# Patient Record
Sex: MALE | Race: BLACK OR AFRICAN AMERICAN | NOT HISPANIC OR LATINO | Employment: OTHER | URBAN - METROPOLITAN AREA
[De-identification: names, ages, dates, MRNs, and addresses within clinical notes are randomized per-mention and may not be internally consistent; named-entity substitution may affect disease eponyms.]

---

## 2017-10-03 ENCOUNTER — HOSPITAL ENCOUNTER (EMERGENCY)
Facility: HOSPITAL | Age: 67
Discharge: HOME | End: 2017-10-03
Attending: EMERGENCY MEDICINE
Payer: MEDICARE

## 2017-10-03 VITALS
WEIGHT: 289 LBS | HEIGHT: 68 IN | DIASTOLIC BLOOD PRESSURE: 73 MMHG | HEART RATE: 71 BPM | OXYGEN SATURATION: 96 % | RESPIRATION RATE: 18 BRPM | TEMPERATURE: 99 F | SYSTOLIC BLOOD PRESSURE: 123 MMHG | BODY MASS INDEX: 43.8 KG/M2

## 2017-10-03 DIAGNOSIS — M25.562 LEFT KNEE PAIN: ICD-10-CM

## 2017-10-03 DIAGNOSIS — R07.81 RIB PAIN ON RIGHT SIDE: ICD-10-CM

## 2017-10-03 DIAGNOSIS — S20.211A RIB CONTUSION, RIGHT, INITIAL ENCOUNTER: Primary | ICD-10-CM

## 2017-10-03 DIAGNOSIS — S80.02XA CONTUSION OF LEFT KNEE, INITIAL ENCOUNTER: ICD-10-CM

## 2017-10-03 PROCEDURE — 25000003 PHARM REV CODE 250: Performed by: EMERGENCY MEDICINE

## 2017-10-03 PROCEDURE — 99283 EMERGENCY DEPT VISIT LOW MDM: CPT

## 2017-10-03 RX ORDER — CYCLOBENZAPRINE HCL 10 MG
10 TABLET ORAL 3 TIMES DAILY PRN
Qty: 15 TABLET | Refills: 0 | Status: SHIPPED | OUTPATIENT
Start: 2017-10-03 | End: 2017-10-08

## 2017-10-03 RX ORDER — TRAMADOL HYDROCHLORIDE 50 MG/1
50 TABLET ORAL EVERY 6 HOURS PRN
Qty: 12 TABLET | Refills: 0 | Status: SHIPPED | OUTPATIENT
Start: 2017-10-03 | End: 2017-10-13

## 2017-10-03 RX ORDER — ACETAMINOPHEN 325 MG/1
975 TABLET ORAL
Status: COMPLETED | OUTPATIENT
Start: 2017-10-03 | End: 2017-10-03

## 2017-10-03 RX ORDER — BUMETANIDE 1 MG/1
2 TABLET ORAL 2 TIMES DAILY
COMMUNITY
End: 2020-02-03

## 2017-10-03 RX ORDER — METFORMIN HYDROCHLORIDE 1000 MG/1
1000 TABLET, FILM COATED, EXTENDED RELEASE ORAL
COMMUNITY
End: 2020-02-03

## 2017-10-03 RX ADMIN — ACETAMINOPHEN 975 MG: 325 TABLET ORAL at 03:10

## 2017-10-03 NOTE — DISCHARGE INSTRUCTIONS
Regarding RIB CONTUSION, I instructed patient to take all medications as prescribed including nonsteroidal anti-inflammatory drugs (to help decrease pain and swelling), narcotic or controlled substances (used for moderate to severe pain - no operating heavy machinery or driving while taking these medications), and other medications appropriate for this condition.  For treatment, encouraged patient to participate in coughing and deep breathing exercises to prevent pneumonia; utilize a pillow to brace ribs when coughing and deep breathing to help decrease pain; use an incentive spirometer as prescribed, if applicable; avoid activities that may cause more pain or damage to ribs; and apply ice packs to area to help decrease swelling and pain.  Patient instructed to return to the emergency department or contact primary care provider for: fever; bruising to chest; develop a worsening cough; suddenly experience dizziness, shortness of breath, or chest pain; begin coughing up blood; or have any questions or concerns regarding the condition or treatment plan.    Regarding KNEE PAIN: Patient instructed to follow prescribed therapy.  I encouraged patient to get plenty of rest, work to obtain/maintain healthy weight and BMI, exercise to improve muscle strength and motion to joint area, protect joint from further injury, and avoid overexerting extremity - especially when stiffness or pain is present. Advised patient to follow up with primary care provider in one week if symptoms are still present or condition worsens.     Regarding CONTUSIONS, I advised patient to: REST the injured area or use it less than usual; apply ICE to decrease swelling and pain and help prevent tissue damage; use COMPRESSION with an elastic bandage to support the area and decrease swelling; ELEVATE injured body part above the level of the heart to help decrease pain and swelling; AVOID using massage or massage to acute injuries as it may slow healing of  the area; AVOID drinking alcohol as it may slow healing of the injury; and avoid stretching injured muscles. Advised patient to return to the emergency department or contact primary care provider if: having trouble moving injured area; notice tingling or numbness in or near the injured area; extremity below the bruise gets cold or turns pale; a new lump develops in the injured area; symptoms do not improve with treatment after 4 to 5 days; there is any questions or concerns about the condition or treatment plan.

## 2017-10-03 NOTE — ED PROVIDER NOTES
"Encounter Date: 10/3/2017       History     Chief Complaint   Patient presents with    Fall     Pt reports he fell on Mon morning after tripping over a stool, tried to use home remedies but nothing worked. C/O R side chest wall pain & L knee pain. - LOC.      The history is provided by the patient.   Fall   The accident occurred yesterday. The fall occurred while standing (pt was leaning on a stool at home and stool tipped over and pt fell on top of stool.  landed on right side of ribs and left knee.  no LOC.  no head trauma.  pt on plavix). He fell from a height of 3 to 5 ft. He landed on a hard floor. There was no blood loss. Point of impact: right side of ribs and left knee. Pain location: right side of ribs and left knee.  no abrasions.  no brusing.  no HA.  no head trauma. Pain scale: mild. He was ambulatory at the scene. There was no entrapment after the fall. There was no drug use involved in the accident. There was no alcohol use involved in the accident. Pertinent negatives include no neck pain, no back pain, no paresthesias, no paralysis, no visual change, no fever, no numbness, no abdominal pain, no bowel incontinence, no nausea, no vomiting, no hematuria, no headaches, no hearing loss, no loss of consciousness and no tingling. The symptoms are aggravated by activity, use of the injured limb, pressure on the injury, standing and ambulation. He has tried heat and rest (denies any otc tylenol or nsaids for pain) for the symptoms. The treatment provided no relief.     Review of patient's allergies indicates:   Allergen Reactions    Asa [aspirin]      Pt states "uncoated" ASA     Past Medical History:   Diagnosis Date    Anticoagulant long-term use     Arthritis     CAD (coronary artery disease)     CHF (congestive heart failure)     Diabetes mellitus     Hyperlipemia     Hypertension      Past Surgical History:   Procedure Laterality Date    CARDIAC SURGERY      CARPAL TUNNEL RELEASE      " CORONARY ANGIOPLASTY WITH STENT PLACEMENT       History reviewed. No pertinent family history.  Social History   Substance Use Topics    Smoking status: Current Every Day Smoker     Packs/day: 1.00     Types: Cigarettes    Smokeless tobacco: Never Used    Alcohol use Yes      Comment: very little     Review of Systems   Constitutional: Negative for fever.   HENT: Negative for sore throat.    Respiratory: Negative for shortness of breath.    Cardiovascular: Negative for chest pain.   Gastrointestinal: Negative for abdominal pain, bowel incontinence, nausea and vomiting.   Genitourinary: Negative for dysuria and hematuria.   Musculoskeletal: Negative for back pain and neck pain.   Skin: Negative for rash.   Neurological: Negative for tingling, loss of consciousness, weakness, numbness, headaches and paresthesias.   Hematological: Does not bruise/bleed easily.   All other systems reviewed and are negative.      Physical Exam     Initial Vitals [10/03/17 0314]   BP Pulse Resp Temp SpO2   137/75 78 18 98.6 °F (37 °C) 95 %      MAP       95.67         Physical Exam    Nursing note and vitals reviewed.  Constitutional: Vital signs are normal. He appears well-developed and well-nourished. He is not diaphoretic. No distress.   HENT:   Head: Normocephalic and atraumatic.   Nose: Nose normal.   Mouth/Throat: Uvula is midline and oropharynx is clear and moist.   Eyes: EOM are normal. Pupils are equal, round, and reactive to light. No scleral icterus.   Neck: Normal range of motion and full passive range of motion without pain. Neck supple. No thyromegaly present.   Cardiovascular: Normal rate, regular rhythm, normal heart sounds and intact distal pulses. Exam reveals no gallop and no friction rub.    No murmur heard.  Pulmonary/Chest: No respiratory distress. He has wheezes (mild end exp wheezing.  pt states this is chronic ) in the right upper field, the right middle field, the right lower field, the left upper field, the  "left middle field and the left lower field. He has no rhonchi. Chest wall is not dull to percussion. He exhibits tenderness and bony tenderness (in area diagrammed). He exhibits no mass, no laceration, no crepitus, no edema, no deformity, no swelling and no retraction.       Abdominal: Soft. Bowel sounds are normal. He exhibits no distension. There is no tenderness. There is no rebound and no guarding.   Musculoskeletal: He exhibits no edema.        Right shoulder: He exhibits bony tenderness (along right side of ribs).        Right hip: Normal.        Left hip: Normal.        Right knee: Normal.        Left knee: He exhibits decreased range of motion (secondary to pain), swelling and bony tenderness (over patella.  no palpable fracture). He exhibits no effusion, no ecchymosis, no deformity, no laceration, no erythema and normal alignment. Tenderness (over patella) found.        Right ankle: Normal.        Left ankle: Normal. Achilles tendon normal.        Thoracic back: Normal.        Lumbar back: Normal.        Arms:       Right upper leg: Normal.        Left upper leg: Normal.        Right lower leg: Normal.        Left lower leg: Normal.        Legs:       Right foot: Normal.        Left foot: Normal.   Lymphadenopathy:     He has no cervical adenopathy.   Neurological: He is alert and oriented to person, place, and time. He has normal strength. No cranial nerve deficit or sensory deficit.   Skin: Skin is warm and dry.   Psychiatric: He has a normal mood and affect. His behavior is normal. Judgment and thought content normal.         ED Course   Procedures  Labs Reviewed - No data to display       Vitals:    10/03/17 0314 10/03/17 0435   BP: 137/75 123/73   Pulse: 78 71   Resp: 18 18   Temp: 98.6 °F (37 °C)    TempSrc: Oral    SpO2: 95% 96%   Weight: 131.1 kg (289 lb)    Height: 5' 8" (1.727 m)        No results found for this or any previous visit.      Imaging Results          X-Ray Ribs 2 View Right " (Preliminary result)  Result time 10/03/17 04:13:40    ED Interpretation by Blaise Howard Jr., MD (10/03/17 04:13:40, [facility], Emergency Medicine)    naf                             X-Ray Knee Complete 4 or More Views Left (Preliminary result)  Result time 10/03/17 04:13:34    ED Interpretation by Blaise Howard Jr., MD (10/03/17 04:13:34, [facility], Emergency Medicine)    naf                              Medications   acetaminophen tablet 975 mg (975 mg Oral Given 10/3/17 0338)         4:24 AM - Re-evaluation: The patient is resting comfortably and is in no acute distress. He states that his symptoms have improved after treatment within ER. Discussed test results, shared treatment plan, specific conditions for return, and importance of follow up with patient and family.  Advised pt to stop smoking.  He understands and agrees with the plan as discussed. Answered  his questions at this time. He has remained hemodynamically stable throughout the ED course and is appropriate for discharge home.     For RIB CONTUSION, I recommended that the patient perform deep breathing exercises to help prevent pneumonia (take 10 deep breaths every hour while awake); brace ribs with hands or a pillow while coughing or deep breathing to help decrease the pain; allow for adequate amount of rest to to decrease swelling and allow the injury to heal faster; avoid activities that may cause more pain or damage to ribs; apply ice packs to help decrease swelling and pain and prevent tissue damage (use an ice pack or put crushed ice in a plastic bag and cover it with a towel and place it on injured area for 15 to 20 minutes every hour as directed).  I instructed patient to contact primary healthcare provider if they: develop a fever; notice increased bruising on chest; have chills and increased coughing; or have any questions or concerns about condition.  Advised patient to return to emergency department  or call 911 if they suddenly have more severe chest pain; cough up blood; experience fever or chills and increased shortness of breath; have abdominal pain; suddenly feel lightheaded and short of breath, or notice tenderness and warmth to the arms or legs. Patient also instructed to take medications as prescribed and to avoid operating heavy machinery or driving while taking muscle relaxer's or pain medications.    Regarding KNEE PAIN: Patient instructed to follow prescribed therapy.  I encouraged patient to get plenty of rest, work to obtain/maintain healthy weight and BMI, exercise to improve muscle strength and motion to joint area, protect joint from further injury, and avoid overexerting extremity - especially when stiffness or pain is present. Advised patient to follow up with primary care provider in one week if symptoms are still present or condition worsens.     Regarding CONTUSIONS, I advised patient to: REST the injured area or use it less than usual; apply ICE to decrease swelling and pain and help prevent tissue damage; use COMPRESSION with an elastic bandage to support the area and decrease swelling; ELEVATE injured body part above the level of the heart to help decrease pain and swelling; AVOID using massage or massage to acute injuries as it may slow healing of the area; AVOID drinking alcohol as it may slow healing of the injury; and avoid stretching injured muscles. Advised patient to return to the emergency department or contact primary care provider if: having trouble moving injured area; notice tingling or numbness in or near the injured area; extremity below the bruise gets cold or turns pale; a new lump develops in the injured area; symptoms do not improve with treatment after 4 to 5 days; there is any questions or concerns about the condition or treatment plan.      Driving or other activities under influence of medications - Patient and/or family/caretaker was given a prescription for, or  instructed to use a medicine that may impair ability to drive, operate machinery, or participate in other potentially dangerous activities.  Patient was instructed not to participate in these activities while under the influence of these medications.    Pre-hypertension/Hypertension: The pt has been informed that they may have pre-hypertension or hypertension based on a blood pressure reading in the ED. I recommend that the pt call the PCP listed on their discharge instructions or a physician of their choice this week to arrange f/u for further evaluation of possible pre-hypertension or hypertension.     Fransisco Durand was given a handout which discussed their disease process, precautions, and instructions for follow-up and therapy.    Follow-up Information     Kiera Dhaliwal MD. Schedule an appointment as soon as possible for a visit in 1 week.    Specialty:  Internal Medicine  Contact information:  [facility] [address]  [phone]             [facility].    Specialty:  Emergency Medicine  Why:  As needed, If symptoms worsen  Contact information:  [address] [address] [address] [phone]                 Discharge Medication List as of 10/3/2017  4:26 AM      START taking these medications    Details   cyclobenzaprine (FLEXERIL) 10 MG tablet Take 1 tablet (10 mg total) by mouth 3 (three) times daily as needed for Muscle spasms., Starting Tue 10/3/2017, Until Sun 10/8/2017, Print      tramadol (ULTRAM) 50 mg tablet Take 1 tablet (50 mg total) by mouth every 6 (six) hours as needed for Pain., Starting Tue 10/3/2017, Until Fri 10/13/2017, Print                ED Diagnosis  1. Rib contusion, right, initial encounter    2. Rib pain on right side    3. Left knee pain    4. Contusion of left knee, initial encounter                             ED Course      Clinical Impression:   The primary encounter diagnosis was Rib contusion, right,  initial encounter. Diagnoses of Rib pain on right side, Left knee pain, and Contusion of left knee, initial encounter were also pertinent to this visit.    Disposition:   Disposition: Discharged  Condition: Stable                        Blaise Lee Robles MD  10/03/17 0447

## 2018-08-11 ENCOUNTER — HOSPITAL ENCOUNTER (EMERGENCY)
Facility: HOSPITAL | Age: 68
Discharge: HOME | End: 2018-08-11
Attending: EMERGENCY MEDICINE

## 2018-08-11 VITALS
RESPIRATION RATE: 22 BRPM | BODY MASS INDEX: 41.54 KG/M2 | SYSTOLIC BLOOD PRESSURE: 127 MMHG | DIASTOLIC BLOOD PRESSURE: 78 MMHG | HEIGHT: 68 IN | TEMPERATURE: 99 F | HEART RATE: 97 BPM | OXYGEN SATURATION: 97 % | WEIGHT: 274.13 LBS

## 2018-08-11 DIAGNOSIS — D64.9 ANEMIA, UNSPECIFIED TYPE: ICD-10-CM

## 2018-08-11 DIAGNOSIS — R11.2 NON-INTRACTABLE VOMITING WITH NAUSEA, UNSPECIFIED VOMITING TYPE: ICD-10-CM

## 2018-08-11 DIAGNOSIS — R10.9 ABDOMINAL PAIN: ICD-10-CM

## 2018-08-11 DIAGNOSIS — R10.84 GENERALIZED ABDOMINAL PAIN: Primary | ICD-10-CM

## 2018-08-11 DIAGNOSIS — K29.00 ACUTE SUPERFICIAL GASTRITIS WITHOUT HEMORRHAGE: ICD-10-CM

## 2018-08-11 LAB
ALBUMIN SERPL BCP-MCNC: 2.8 G/DL
ALP SERPL-CCNC: 100 U/L
ALT SERPL W/O P-5'-P-CCNC: 13 U/L
ANION GAP SERPL CALC-SCNC: 10 MMOL/L
AST SERPL-CCNC: 18 U/L
BASOPHILS # BLD AUTO: 0.02 K/UL
BASOPHILS NFR BLD: 0.2 %
BILIRUB SERPL-MCNC: 0.3 MG/DL
BILIRUB UR QL STRIP: NEGATIVE
BUN SERPL-MCNC: 39 MG/DL
CALCIUM SERPL-MCNC: 8.3 MG/DL
CHLORIDE SERPL-SCNC: 105 MMOL/L
CLARITY UR REFRACT.AUTO: CLEAR
CO2 SERPL-SCNC: 20 MMOL/L
COLOR UR AUTO: YELLOW
CREAT SERPL-MCNC: 1.2 MG/DL
DIFFERENTIAL METHOD: ABNORMAL
EOSINOPHIL # BLD AUTO: 0.1 K/UL
EOSINOPHIL NFR BLD: 0.9 %
ERYTHROCYTE [DISTWIDTH] IN BLOOD BY AUTOMATED COUNT: 20.7 %
EST. GFR  (AFRICAN AMERICAN): >60 ML/MIN/1.73 M^2
EST. GFR  (NON AFRICAN AMERICAN): >60 ML/MIN/1.73 M^2
GLUCOSE SERPL-MCNC: 80 MG/DL
GLUCOSE UR QL STRIP: NEGATIVE
HCT VFR BLD AUTO: 29.1 %
HGB BLD-MCNC: 9 G/DL
HGB UR QL STRIP: NEGATIVE
KETONES UR QL STRIP: NEGATIVE
LEUKOCYTE ESTERASE UR QL STRIP: NEGATIVE
LIPASE SERPL-CCNC: 10 U/L
LYMPHOCYTES # BLD AUTO: 1.6 K/UL
LYMPHOCYTES NFR BLD: 17.7 %
MCH RBC QN AUTO: 24.3 PG
MCHC RBC AUTO-ENTMCNC: 30.9 G/DL
MCV RBC AUTO: 79 FL
MONOCYTES # BLD AUTO: 0.8 K/UL
MONOCYTES NFR BLD: 8.5 %
NEUTROPHILS # BLD AUTO: 6.7 K/UL
NEUTROPHILS NFR BLD: 72.5 %
NITRITE UR QL STRIP: NEGATIVE
PH UR STRIP: 7 [PH] (ref 5–8)
PLATELET # BLD AUTO: 212 K/UL
PMV BLD AUTO: 10.2 FL
POTASSIUM SERPL-SCNC: 5.3 MMOL/L
PROT SERPL-MCNC: 6.7 G/DL
PROT UR QL STRIP: NEGATIVE
RBC # BLD AUTO: 3.7 M/UL
SODIUM SERPL-SCNC: 135 MMOL/L
SP GR UR STRIP: 1.01 (ref 1–1.03)
TROPONIN I SERPL DL<=0.01 NG/ML-MCNC: <0.006 NG/ML
URN SPEC COLLECT METH UR: NORMAL
UROBILINOGEN UR STRIP-ACNC: NEGATIVE EU/DL
WBC # BLD AUTO: 9.24 K/UL

## 2018-08-11 PROCEDURE — 80053 COMPREHEN METABOLIC PANEL: CPT

## 2018-08-11 PROCEDURE — 81003 URINALYSIS AUTO W/O SCOPE: CPT

## 2018-08-11 PROCEDURE — 84484 ASSAY OF TROPONIN QUANT: CPT

## 2018-08-11 PROCEDURE — 25000003 PHARM REV CODE 250: Performed by: EMERGENCY MEDICINE

## 2018-08-11 PROCEDURE — 93005 ELECTROCARDIOGRAM TRACING: CPT

## 2018-08-11 PROCEDURE — 99284 EMERGENCY DEPT VISIT MOD MDM: CPT | Mod: 25

## 2018-08-11 PROCEDURE — 93010 ELECTROCARDIOGRAM REPORT: CPT | Mod: ,,, | Performed by: INTERNAL MEDICINE

## 2018-08-11 PROCEDURE — 99900035 HC TECH TIME PER 15 MIN (STAT)

## 2018-08-11 PROCEDURE — 83690 ASSAY OF LIPASE: CPT

## 2018-08-11 PROCEDURE — 85025 COMPLETE CBC W/AUTO DIFF WBC: CPT

## 2018-08-11 RX ORDER — ONDANSETRON 4 MG/1
4 TABLET, ORALLY DISINTEGRATING ORAL EVERY 8 HOURS PRN
Qty: 12 TABLET | Refills: 0 | Status: SHIPPED | OUTPATIENT
Start: 2018-08-11 | End: 2020-02-03

## 2018-08-11 RX ORDER — PANTOPRAZOLE SODIUM 20 MG/1
20 TABLET, DELAYED RELEASE ORAL DAILY
Qty: 30 TABLET | Refills: 0 | Status: SHIPPED | OUTPATIENT
Start: 2018-08-11 | End: 2020-02-03

## 2018-08-11 RX ADMIN — LIDOCAINE HYDROCHLORIDE 50 ML: 20 SOLUTION ORAL; TOPICAL at 04:08

## 2018-08-11 NOTE — ED NOTES
Pt stable, in NAD, and states no further needs at this time.MD aware of vitals. Pt to be d/c'd home.

## 2018-08-11 NOTE — ED NOTES
Patient complains of abd pain. Reports onset of symptoms 1 month     Level of Consciousness: Patient is awake, alert, and oriented to person, place, time, and situation. Speech is clear.   HEENT: Symmetrical face, PERRLA, moist mucous membranes, no congestion/drainage, no JVD, pt able to swallow   Appearance: Patient resting comfortably in bed, hygiene and clothing are both intact and appropriate.   Skin: Skin is warm, dry, and intact. Skin is of normal color, free of any skin breakdown. Mucus membranes pink and moist.   Musculoskeletal: Moves all extremities well. Full active ROM. No deformities noted. Denies any weakness. Gait steady, patient ambulates independently, without assistive device.   Respiratory: Airway open and patent. Respirations equal and unlabored. Lung sounds clear upon auscultation. Patient denies any SOB.   Cardiac: Regular rate and rhythm. +2/3 pitting edema noted to bilateral lower extremities. Denies any chest pain or discomfort.   GI: Abdomen soft,obese.tender to palpation to RLQ/LLQ. Bowel sounds present and active in all quadrants x 4. No distention noted. Denies any nausea or vomiting.   : Denies any problem with urination. Denies any problem with bowel movement.   Neurological: Normal sensation reported to all extremities. Symmetrical expressions noted to face. No obvious neurological deficits noted.   Psychosocial: Patient is calm and cooperative, appropriate to situation. Family is involved in patient care.     Patient informed of plan of care, verbalizes understanding, and has no questions or concern at this time. Bed is in the lowest position and locked. Call bell within reach of the patient. Will continue to monitor.

## 2018-08-11 NOTE — ED PROVIDER NOTES
"Encounter Date: 8/11/2018       History     Chief Complaint   Patient presents with    Abdominal Pain     Patient reports lower abd pain with nausea and vomiting. He states he started with the pain intermittently for 1 month but worse today due to the voming denies diarrhea     The history is provided by the patient.   Abdominal Pain   Illness onset: @ 1 month. The onset of the illness was gradual. The problem has been gradually worsening. The abdominal pain is generalized. The severity of the abdominal pain is 4/10. Relieved by: occ by mylanta. The abdominal pain is exacerbated by vomiting. The other symptoms of the illness include nausea and vomiting. The other symptoms of the illness do not include fever, jaundice, shortness of breath, diarrhea, dysuria, hematemesis or hematochezia.   Nausea began today. The nausea is associated with eating. The nausea is exacerbated by food.   The vomiting began today. Vomiting occurs 2 to 5 times per day. The emesis contains stomach contents. Risk factors for illness leading to emesis include suspect food intake.   The patient has not had a change in bowel habit. Symptoms associated with the illness do not include chills, anorexia, diaphoresis, constipation, urgency, hematuria or back pain.     Patient states he ate some chicken that was a little bit red last night that may have caused him to be sick.  He states he has not seen any blood but saw some dark stuff.      Review of patient's allergies indicates:   Allergen Reactions    Asa [aspirin]      Pt states "uncoated" ASA     Past Medical History:   Diagnosis Date    Anticoagulant long-term use     Arthritis     CAD (coronary artery disease)     CHF (congestive heart failure)     Diabetes mellitus     Hyperlipemia     Hypertension      Past Surgical History:   Procedure Laterality Date    CARDIAC SURGERY      CARPAL TUNNEL RELEASE      CORONARY ANGIOPLASTY WITH STENT PLACEMENT       Family History   Problem " "Relation Age of Onset    Heart disease Mother     Cancer Father      Social History   Substance Use Topics    Smoking status: Current Every Day Smoker     Packs/day: 1.00     Types: Cigarettes    Smokeless tobacco: Never Used    Alcohol use Yes      Comment: very little     Review of Systems   Constitutional: Negative for chills, diaphoresis and fever.   HENT: Negative for sore throat.    Respiratory: Negative for shortness of breath.    Cardiovascular: Negative for chest pain.   Gastrointestinal: Positive for abdominal pain, nausea and vomiting. Negative for anorexia, constipation, diarrhea, hematemesis, hematochezia and jaundice.   Genitourinary: Negative for dysuria, hematuria and urgency.   Musculoskeletal: Negative for back pain.   Skin: Negative for rash.   Neurological: Negative for weakness.   Hematological: Does not bruise/bleed easily.   All other systems reviewed and are negative.      Physical Exam     Initial Vitals [08/11/18 1555]   BP Pulse Resp Temp SpO2   134/65 107 (!) 24 98.5 °F (36.9 °C) 98 %      MAP       --         Vitals:    08/11/18 1555   BP: 134/65   Pulse: 107   Resp: (!) 24   Temp: 98.5 °F (36.9 °C)   TempSrc: Oral   SpO2: 98%   Weight: 124.3 kg (274 lb 2.3 oz)   Height: 5' 8" (1.727 m)     Vitals:    08/11/18 1555 08/11/18 1800   BP: 134/65 127/78   Pulse: 107 97   Resp: (!) 24 (!) 22   Temp: 98.5 °F (36.9 °C)    TempSrc: Oral    SpO2: 98% 97%   Weight: 124.3 kg (274 lb 2.3 oz)    Height: 5' 8" (1.727 m)      Physical Exam    Nursing note and vitals reviewed.  Constitutional: He appears well-developed and well-nourished.   HENT:   Head: Normocephalic and atraumatic.   Mouth/Throat: Oropharynx is clear and moist.   Eyes: EOM are normal. Pupils are equal, round, and reactive to light.   Neck: Normal range of motion. Neck supple.   Cardiovascular: Regular rhythm and intact distal pulses. Tachycardia present.    Murmur heard.   Systolic murmur is present with a grade of 2/6   Pulse = " 104  Trace pitting edema bilateral lower extremities   Pulmonary/Chest: Breath sounds normal. No respiratory distress. He has no wheezes. He has no rhonchi.   Abdominal: Soft. Bowel sounds are normal. He exhibits distension. There is tenderness. There is no rebound.   Abdomen distended but soft positive bowel sounds.  No rebound.   Musculoskeletal: Normal range of motion. He exhibits no edema.   Neurological: He is alert and oriented to person, place, and time. He has normal strength. No cranial nerve deficit or sensory deficit.   Skin: Skin is warm and dry. No rash noted.   Psychiatric: He has a normal mood and affect. His behavior is normal. Judgment and thought content normal.         ED Course   Procedures  Labs Reviewed   CBC W/ AUTO DIFFERENTIAL - Abnormal; Notable for the following:        Result Value    RBC 3.70 (*)     Hemoglobin 9.0 (*)     Hematocrit 29.1 (*)     MCV 79 (*)     MCH 24.3 (*)     MCHC 30.9 (*)     RDW 20.7 (*)     Lymph% 17.7 (*)     All other components within normal limits   URINALYSIS   COMPREHENSIVE METABOLIC PANEL   LIPASE   TROPONIN I       Results for orders placed or performed during the hospital encounter of 08/11/18   CBC W/ AUTO DIFFERENTIAL   Result Value Ref Range    WBC 9.24 3.90 - 12.70 K/uL    RBC 3.70 (L) 4.60 - 6.20 M/uL    Hemoglobin 9.0 (L) 14.0 - 18.0 g/dL    Hematocrit 29.1 (L) 40.0 - 54.0 %    MCV 79 (L) 82 - 98 fL    MCH 24.3 (L) 27.0 - 31.0 pg    MCHC 30.9 (L) 32.0 - 36.0 g/dL    RDW 20.7 (H) 11.5 - 14.5 %    Platelets 212 150 - 350 K/uL    MPV 10.2 9.2 - 12.9 fL    Gran # (ANC) 6.7 1.8 - 7.7 K/uL    Lymph # 1.6 1.0 - 4.8 K/uL    Mono # 0.8 0.3 - 1.0 K/uL    Eos # 0.1 0.0 - 0.5 K/uL    Baso # 0.02 0.00 - 0.20 K/uL    Gran% 72.5 38.0 - 73.0 %    Lymph% 17.7 (L) 18.0 - 48.0 %    Mono% 8.5 4.0 - 15.0 %    Eosinophil% 0.9 0.0 - 8.0 %    Basophil% 0.2 0.0 - 1.9 %    Differential Method Automated    Urinalysis - Clean Catch   Result Value Ref Range    Specimen UA Urine,  Clean Catch     Color, UA Yellow Yellow, Straw, Luiza    Appearance, UA Clear Clear    pH, UA 7.0 5.0 - 8.0    Specific Gravity, UA 1.010 1.005 - 1.030    Protein, UA Negative Negative    Glucose, UA Negative Negative    Ketones, UA Negative Negative    Bilirubin (UA) Negative Negative    Occult Blood UA Negative Negative    Nitrite, UA Negative Negative    Urobilinogen, UA Negative <2.0 EU/dL    Leukocytes, UA Negative Negative      EKG Readings: (Independently Interpreted)   Initial Reading: No STEMI. Rhythm: Normal Sinus Rhythm. Heart Rate: 99. Ectopy: No Ectopy. ST Segments: Normal ST Segments. T Waves: Normal. Other Impression: Normal sinus at 99 with T-wave inversions laterally and mild ST depressions inferiorly.  I do not have any old EKGs to compare to.  Nonspecific ST T wave abnormalities no STEMI       Imaging Results          CT Renal Stone Study ABD Pelvis WO (In process)                            6:04 PM I had a long discussion with the patient in the differential diagnosis including peptic ulcer disease or upper GI bleed.  He feels much improved after his treatment here in the emergency department he understands he is slightly anemic.  He declined any further evaluation treatment at this time including rectal exam.  He has a follow-up appointment with his doctor on Monday and he will follow up with her and discuss possible referral to GI for EGD as indicated.  He will return emergency department immediately for any worsening signs or symptoms - especially any signs of bleeding.  Patient's son and daughter-in-law are in the room during this discussion and they agree with current management.  Patient does not want to stay in the hospital for further evaluation or observation at this time.    I discussed with patient and/or family/caretaker that evaluation in the ED does not suggest any emergent or life threatening medical conditions requiring immediate intervention beyond what was provided in the ED,  and I believe patient is safe for discharge.  Regardless, an unremarkable evaluation in the ED does not preclude the development or presence of a serious of life threatening condition. As such, patient was instructed to return immediately for any worsening or change in current symptoms.           Clinical Impression:   The primary encounter diagnosis was Generalized abdominal pain. Diagnoses of Abdominal pain, Acute superficial gastritis without hemorrhage, and Non-intractable vomiting with nausea, unspecified vomiting type were also pertinent to this visit.      Disposition:   Disposition: Discharged  Condition: Stable                        Rojas Rosas MD  08/11/18 1806       Rojas Rosas MD  08/11/18 1813

## 2019-12-23 ENCOUNTER — LAB VISIT (OUTPATIENT)
Dept: LAB | Facility: HOSPITAL | Age: 69
End: 2019-12-23
Attending: FAMILY MEDICINE

## 2019-12-23 DIAGNOSIS — R78.81 BACTEREMIA: Primary | ICD-10-CM

## 2019-12-23 LAB
ALBUMIN SERPL BCP-MCNC: 3.1 G/DL (ref 3.5–5.2)
ALP SERPL-CCNC: 168 U/L (ref 55–135)
ALT SERPL W/O P-5'-P-CCNC: 6 U/L (ref 10–44)
ANION GAP SERPL CALC-SCNC: 10 MMOL/L (ref 8–16)
AST SERPL-CCNC: 16 U/L (ref 10–40)
BASOPHILS # BLD AUTO: 0.03 K/UL (ref 0–0.2)
BASOPHILS NFR BLD: 0.4 % (ref 0–1.9)
BILIRUB SERPL-MCNC: 0.2 MG/DL (ref 0.1–1)
BUN SERPL-MCNC: 12 MG/DL (ref 8–23)
CALCIUM SERPL-MCNC: 9 MG/DL (ref 8.7–10.5)
CHLORIDE SERPL-SCNC: 100 MMOL/L (ref 95–110)
CO2 SERPL-SCNC: 31 MMOL/L (ref 23–29)
CREAT SERPL-MCNC: 1.1 MG/DL (ref 0.5–1.4)
DIFFERENTIAL METHOD: ABNORMAL
EOSINOPHIL # BLD AUTO: 0.6 K/UL (ref 0–0.5)
EOSINOPHIL NFR BLD: 7.7 % (ref 0–8)
ERYTHROCYTE [DISTWIDTH] IN BLOOD BY AUTOMATED COUNT: 18.7 % (ref 11.5–14.5)
EST. GFR  (AFRICAN AMERICAN): >60 ML/MIN/1.73 M^2
EST. GFR  (NON AFRICAN AMERICAN): >60 ML/MIN/1.73 M^2
GLUCOSE SERPL-MCNC: 79 MG/DL (ref 70–110)
HCT VFR BLD AUTO: 36.3 % (ref 40–54)
HGB BLD-MCNC: 11.2 G/DL (ref 14–18)
IMM GRANULOCYTES # BLD AUTO: 0.03 K/UL (ref 0–0.04)
IMM GRANULOCYTES NFR BLD AUTO: 0.4 % (ref 0–0.5)
LYMPHOCYTES # BLD AUTO: 1.2 K/UL (ref 1–4.8)
LYMPHOCYTES NFR BLD: 15 % (ref 18–48)
MCH RBC QN AUTO: 27.5 PG (ref 27–31)
MCHC RBC AUTO-ENTMCNC: 30.9 G/DL (ref 32–36)
MCV RBC AUTO: 89 FL (ref 82–98)
MONOCYTES # BLD AUTO: 0.8 K/UL (ref 0.3–1)
MONOCYTES NFR BLD: 10.2 % (ref 4–15)
NEUTROPHILS # BLD AUTO: 5.5 K/UL (ref 1.8–7.7)
NEUTROPHILS NFR BLD: 66.3 % (ref 38–73)
NRBC BLD-RTO: 0 /100 WBC
PLATELET # BLD AUTO: 233 K/UL (ref 150–350)
PMV BLD AUTO: 10.6 FL (ref 9.2–12.9)
POTASSIUM SERPL-SCNC: 3.8 MMOL/L (ref 3.5–5.1)
PROT SERPL-MCNC: 6.6 G/DL (ref 6–8.4)
RBC # BLD AUTO: 4.08 M/UL (ref 4.6–6.2)
SODIUM SERPL-SCNC: 141 MMOL/L (ref 136–145)
WBC # BLD AUTO: 8.22 K/UL (ref 3.9–12.7)

## 2019-12-23 PROCEDURE — 85025 COMPLETE CBC W/AUTO DIFF WBC: CPT | Mod: PO

## 2019-12-23 PROCEDURE — 80053 COMPREHEN METABOLIC PANEL: CPT | Mod: PO

## 2019-12-30 ENCOUNTER — LAB VISIT (OUTPATIENT)
Dept: LAB | Facility: HOSPITAL | Age: 69
End: 2019-12-30
Attending: FAMILY MEDICINE

## 2019-12-30 DIAGNOSIS — A41.02 METHICILLIN RESISTANT STAPHYLOCOCCUS AUREUS SEPTICEMIA: Primary | ICD-10-CM

## 2019-12-30 LAB
ALBUMIN SERPL BCP-MCNC: 2.8 G/DL (ref 3.5–5.2)
ALP SERPL-CCNC: 144 U/L (ref 55–135)
ALT SERPL W/O P-5'-P-CCNC: <5 U/L (ref 10–44)
ANION GAP SERPL CALC-SCNC: 7 MMOL/L (ref 8–16)
AST SERPL-CCNC: 16 U/L (ref 10–40)
BASOPHILS # BLD AUTO: 0.04 K/UL (ref 0–0.2)
BASOPHILS NFR BLD: 0.6 % (ref 0–1.9)
BILIRUB SERPL-MCNC: 0.2 MG/DL (ref 0.1–1)
BUN SERPL-MCNC: 11 MG/DL (ref 8–23)
CALCIUM SERPL-MCNC: 9.3 MG/DL (ref 8.7–10.5)
CHLORIDE SERPL-SCNC: 103 MMOL/L (ref 95–110)
CO2 SERPL-SCNC: 32 MMOL/L (ref 23–29)
CREAT SERPL-MCNC: 1 MG/DL (ref 0.5–1.4)
DIFFERENTIAL METHOD: ABNORMAL
EOSINOPHIL # BLD AUTO: 0.6 K/UL (ref 0–0.5)
EOSINOPHIL NFR BLD: 8.9 % (ref 0–8)
ERYTHROCYTE [DISTWIDTH] IN BLOOD BY AUTOMATED COUNT: 18.4 % (ref 11.5–14.5)
EST. GFR  (AFRICAN AMERICAN): >60 ML/MIN/1.73 M^2
EST. GFR  (NON AFRICAN AMERICAN): >60 ML/MIN/1.73 M^2
GLUCOSE SERPL-MCNC: 113 MG/DL (ref 70–110)
HCT VFR BLD AUTO: 35.7 % (ref 40–54)
HGB BLD-MCNC: 11 G/DL (ref 14–18)
IMM GRANULOCYTES # BLD AUTO: 0.03 K/UL (ref 0–0.04)
IMM GRANULOCYTES NFR BLD AUTO: 0.4 % (ref 0–0.5)
LYMPHOCYTES # BLD AUTO: 0.8 K/UL (ref 1–4.8)
LYMPHOCYTES NFR BLD: 11.8 % (ref 18–48)
MCH RBC QN AUTO: 27.7 PG (ref 27–31)
MCHC RBC AUTO-ENTMCNC: 30.8 G/DL (ref 32–36)
MCV RBC AUTO: 90 FL (ref 82–98)
MONOCYTES # BLD AUTO: 0.6 K/UL (ref 0.3–1)
MONOCYTES NFR BLD: 8.8 % (ref 4–15)
NEUTROPHILS # BLD AUTO: 4.9 K/UL (ref 1.8–7.7)
NEUTROPHILS NFR BLD: 69.5 % (ref 38–73)
NRBC BLD-RTO: 0 /100 WBC
PLATELET # BLD AUTO: 257 K/UL (ref 150–350)
PMV BLD AUTO: 10.6 FL (ref 9.2–12.9)
POTASSIUM SERPL-SCNC: 3.5 MMOL/L (ref 3.5–5.1)
PROT SERPL-MCNC: 6.1 G/DL (ref 6–8.4)
RBC # BLD AUTO: 3.97 M/UL (ref 4.6–6.2)
SODIUM SERPL-SCNC: 142 MMOL/L (ref 136–145)
WBC # BLD AUTO: 7.06 K/UL (ref 3.9–12.7)

## 2019-12-30 PROCEDURE — 80053 COMPREHEN METABOLIC PANEL: CPT | Mod: PO

## 2019-12-30 PROCEDURE — 85025 COMPLETE CBC W/AUTO DIFF WBC: CPT | Mod: PO

## 2020-01-06 ENCOUNTER — LAB VISIT (OUTPATIENT)
Dept: LAB | Facility: HOSPITAL | Age: 70
End: 2020-01-06
Attending: FAMILY MEDICINE

## 2020-01-06 DIAGNOSIS — A41.02 METHICILLIN RESISTANT STAPHYLOCOCCUS AUREUS SEPTICEMIA: Primary | ICD-10-CM

## 2020-01-06 LAB
ALBUMIN SERPL BCP-MCNC: 2.9 G/DL (ref 3.5–5.2)
ALP SERPL-CCNC: 145 U/L (ref 55–135)
ALT SERPL W/O P-5'-P-CCNC: <5 U/L (ref 10–44)
ANION GAP SERPL CALC-SCNC: 9 MMOL/L (ref 8–16)
AST SERPL-CCNC: 19 U/L (ref 10–40)
BASOPHILS # BLD AUTO: 0.02 K/UL (ref 0–0.2)
BASOPHILS NFR BLD: 0.3 % (ref 0–1.9)
BILIRUB SERPL-MCNC: 0.3 MG/DL (ref 0.1–1)
BUN SERPL-MCNC: 11 MG/DL (ref 8–23)
CALCIUM SERPL-MCNC: 9 MG/DL (ref 8.7–10.5)
CHLORIDE SERPL-SCNC: 99 MMOL/L (ref 95–110)
CO2 SERPL-SCNC: 33 MMOL/L (ref 23–29)
CREAT SERPL-MCNC: 1.1 MG/DL (ref 0.5–1.4)
DIFFERENTIAL METHOD: ABNORMAL
EOSINOPHIL # BLD AUTO: 0.2 K/UL (ref 0–0.5)
EOSINOPHIL NFR BLD: 2.8 % (ref 0–8)
ERYTHROCYTE [DISTWIDTH] IN BLOOD BY AUTOMATED COUNT: 18 % (ref 11.5–14.5)
EST. GFR  (AFRICAN AMERICAN): >60 ML/MIN/1.73 M^2
EST. GFR  (NON AFRICAN AMERICAN): >60 ML/MIN/1.73 M^2
GLUCOSE SERPL-MCNC: 97 MG/DL (ref 70–110)
HCT VFR BLD AUTO: 35.4 % (ref 40–54)
HGB BLD-MCNC: 10.9 G/DL (ref 14–18)
IMM GRANULOCYTES # BLD AUTO: 0.06 K/UL (ref 0–0.04)
IMM GRANULOCYTES NFR BLD AUTO: 1 % (ref 0–0.5)
LYMPHOCYTES # BLD AUTO: 0.6 K/UL (ref 1–4.8)
LYMPHOCYTES NFR BLD: 10.9 % (ref 18–48)
MCH RBC QN AUTO: 27.4 PG (ref 27–31)
MCHC RBC AUTO-ENTMCNC: 30.8 G/DL (ref 32–36)
MCV RBC AUTO: 89 FL (ref 82–98)
MONOCYTES # BLD AUTO: 0.7 K/UL (ref 0.3–1)
MONOCYTES NFR BLD: 12.7 % (ref 4–15)
NEUTROPHILS # BLD AUTO: 4.2 K/UL (ref 1.8–7.7)
NEUTROPHILS NFR BLD: 72.3 % (ref 38–73)
NRBC BLD-RTO: 0 /100 WBC
PLATELET # BLD AUTO: 218 K/UL (ref 150–350)
PMV BLD AUTO: 10.7 FL (ref 9.2–12.9)
POTASSIUM SERPL-SCNC: 3.4 MMOL/L (ref 3.5–5.1)
PROT SERPL-MCNC: 6.5 G/DL (ref 6–8.4)
RBC # BLD AUTO: 3.98 M/UL (ref 4.6–6.2)
SODIUM SERPL-SCNC: 141 MMOL/L (ref 136–145)
WBC # BLD AUTO: 5.76 K/UL (ref 3.9–12.7)

## 2020-01-06 PROCEDURE — 85025 COMPLETE CBC W/AUTO DIFF WBC: CPT | Mod: PO

## 2020-01-06 PROCEDURE — 80053 COMPREHEN METABOLIC PANEL: CPT | Mod: PO

## 2020-01-14 ENCOUNTER — LAB VISIT (OUTPATIENT)
Dept: LAB | Facility: HOSPITAL | Age: 70
End: 2020-01-14
Attending: FAMILY MEDICINE

## 2020-01-14 DIAGNOSIS — A41.02 METHICILLIN RESISTANT STAPHYLOCOCCUS AUREUS SEPTICEMIA: Primary | ICD-10-CM

## 2020-01-14 LAB
ALBUMIN SERPL BCP-MCNC: 2.8 G/DL (ref 3.5–5.2)
ALP SERPL-CCNC: 148 U/L (ref 55–135)
ALT SERPL W/O P-5'-P-CCNC: <5 U/L (ref 10–44)
ANION GAP SERPL CALC-SCNC: 9 MMOL/L (ref 8–16)
AST SERPL-CCNC: 13 U/L (ref 10–40)
BASOPHILS # BLD AUTO: 0.04 K/UL (ref 0–0.2)
BASOPHILS NFR BLD: 0.6 % (ref 0–1.9)
BILIRUB SERPL-MCNC: 0.1 MG/DL (ref 0.1–1)
BUN SERPL-MCNC: 13 MG/DL (ref 8–23)
CALCIUM SERPL-MCNC: 8.9 MG/DL (ref 8.7–10.5)
CHLORIDE SERPL-SCNC: 98 MMOL/L (ref 95–110)
CO2 SERPL-SCNC: 34 MMOL/L (ref 23–29)
CREAT SERPL-MCNC: 1.1 MG/DL (ref 0.5–1.4)
DIFFERENTIAL METHOD: ABNORMAL
EOSINOPHIL # BLD AUTO: 0.4 K/UL (ref 0–0.5)
EOSINOPHIL NFR BLD: 5.5 % (ref 0–8)
ERYTHROCYTE [DISTWIDTH] IN BLOOD BY AUTOMATED COUNT: 17.7 % (ref 11.5–14.5)
EST. GFR  (AFRICAN AMERICAN): >60 ML/MIN/1.73 M^2
EST. GFR  (NON AFRICAN AMERICAN): >60 ML/MIN/1.73 M^2
GLUCOSE SERPL-MCNC: 112 MG/DL (ref 70–110)
HCT VFR BLD AUTO: 35.3 % (ref 40–54)
HGB BLD-MCNC: 10.8 G/DL (ref 14–18)
IMM GRANULOCYTES # BLD AUTO: 0.01 K/UL (ref 0–0.04)
IMM GRANULOCYTES NFR BLD AUTO: 0.2 % (ref 0–0.5)
LYMPHOCYTES # BLD AUTO: 1.3 K/UL (ref 1–4.8)
LYMPHOCYTES NFR BLD: 20.1 % (ref 18–48)
MCH RBC QN AUTO: 27.6 PG (ref 27–31)
MCHC RBC AUTO-ENTMCNC: 30.6 G/DL (ref 32–36)
MCV RBC AUTO: 90 FL (ref 82–98)
MONOCYTES # BLD AUTO: 0.7 K/UL (ref 0.3–1)
MONOCYTES NFR BLD: 11.1 % (ref 4–15)
NEUTROPHILS # BLD AUTO: 4 K/UL (ref 1.8–7.7)
NEUTROPHILS NFR BLD: 62.5 % (ref 38–73)
NRBC BLD-RTO: 0 /100 WBC
PLATELET # BLD AUTO: 250 K/UL (ref 150–350)
PMV BLD AUTO: 10.9 FL (ref 9.2–12.9)
POTASSIUM SERPL-SCNC: 3.3 MMOL/L (ref 3.5–5.1)
PROT SERPL-MCNC: 6.4 G/DL (ref 6–8.4)
RBC # BLD AUTO: 3.92 M/UL (ref 4.6–6.2)
SODIUM SERPL-SCNC: 141 MMOL/L (ref 136–145)
WBC # BLD AUTO: 6.33 K/UL (ref 3.9–12.7)

## 2020-01-14 PROCEDURE — 85025 COMPLETE CBC W/AUTO DIFF WBC: CPT | Mod: PO

## 2020-01-14 PROCEDURE — 80053 COMPREHEN METABOLIC PANEL: CPT | Mod: PO

## 2020-01-20 ENCOUNTER — LAB VISIT (OUTPATIENT)
Dept: LAB | Facility: HOSPITAL | Age: 70
End: 2020-01-20
Attending: FAMILY MEDICINE

## 2020-01-20 DIAGNOSIS — A41.02 METHICILLIN RESISTANT STAPHYLOCOCCUS AUREUS SEPTICEMIA: Primary | ICD-10-CM

## 2020-01-20 LAB
ALBUMIN SERPL BCP-MCNC: 2.8 G/DL (ref 3.5–5.2)
ALP SERPL-CCNC: 139 U/L (ref 55–135)
ALT SERPL W/O P-5'-P-CCNC: <5 U/L (ref 10–44)
ANION GAP SERPL CALC-SCNC: 10 MMOL/L (ref 8–16)
AST SERPL-CCNC: 16 U/L (ref 10–40)
BASOPHILS # BLD AUTO: 0.03 K/UL (ref 0–0.2)
BASOPHILS NFR BLD: 0.5 % (ref 0–1.9)
BILIRUB SERPL-MCNC: 0.2 MG/DL (ref 0.1–1)
BUN SERPL-MCNC: 10 MG/DL (ref 8–23)
CALCIUM SERPL-MCNC: 8.4 MG/DL (ref 8.7–10.5)
CHLORIDE SERPL-SCNC: 101 MMOL/L (ref 95–110)
CO2 SERPL-SCNC: 33 MMOL/L (ref 23–29)
CREAT SERPL-MCNC: 0.9 MG/DL (ref 0.5–1.4)
DIFFERENTIAL METHOD: ABNORMAL
EOSINOPHIL # BLD AUTO: 0.3 K/UL (ref 0–0.5)
EOSINOPHIL NFR BLD: 4.9 % (ref 0–8)
ERYTHROCYTE [DISTWIDTH] IN BLOOD BY AUTOMATED COUNT: 17.5 % (ref 11.5–14.5)
EST. GFR  (AFRICAN AMERICAN): >60 ML/MIN/1.73 M^2
EST. GFR  (NON AFRICAN AMERICAN): >60 ML/MIN/1.73 M^2
GLUCOSE SERPL-MCNC: 79 MG/DL (ref 70–110)
HCT VFR BLD AUTO: 37.3 % (ref 40–54)
HGB BLD-MCNC: 11 G/DL (ref 14–18)
IMM GRANULOCYTES # BLD AUTO: 0.01 K/UL (ref 0–0.04)
IMM GRANULOCYTES NFR BLD AUTO: 0.2 % (ref 0–0.5)
LYMPHOCYTES # BLD AUTO: 1.3 K/UL (ref 1–4.8)
LYMPHOCYTES NFR BLD: 22.9 % (ref 18–48)
MCH RBC QN AUTO: 26.8 PG (ref 27–31)
MCHC RBC AUTO-ENTMCNC: 29.5 G/DL (ref 32–36)
MCV RBC AUTO: 91 FL (ref 82–98)
MONOCYTES # BLD AUTO: 0.7 K/UL (ref 0.3–1)
MONOCYTES NFR BLD: 12.8 % (ref 4–15)
NEUTROPHILS # BLD AUTO: 3.2 K/UL (ref 1.8–7.7)
NEUTROPHILS NFR BLD: 58.7 % (ref 38–73)
NRBC BLD-RTO: 0 /100 WBC
PLATELET # BLD AUTO: 244 K/UL (ref 150–350)
PMV BLD AUTO: 10.6 FL (ref 9.2–12.9)
POTASSIUM SERPL-SCNC: 4.2 MMOL/L (ref 3.5–5.1)
PROT SERPL-MCNC: 6.4 G/DL (ref 6–8.4)
RBC # BLD AUTO: 4.11 M/UL (ref 4.6–6.2)
SODIUM SERPL-SCNC: 144 MMOL/L (ref 136–145)
WBC # BLD AUTO: 5.47 K/UL (ref 3.9–12.7)

## 2020-01-20 PROCEDURE — 85025 COMPLETE CBC W/AUTO DIFF WBC: CPT | Mod: PO

## 2020-01-20 PROCEDURE — 80053 COMPREHEN METABOLIC PANEL: CPT | Mod: PO

## 2020-01-27 ENCOUNTER — LAB VISIT (OUTPATIENT)
Dept: LAB | Facility: HOSPITAL | Age: 70
End: 2020-01-27
Attending: FAMILY MEDICINE

## 2020-01-27 DIAGNOSIS — A41.02 METHICILLIN RESISTANT STAPHYLOCOCCUS AUREUS SEPTICEMIA: Primary | ICD-10-CM

## 2020-01-27 LAB
ALBUMIN SERPL BCP-MCNC: 2.9 G/DL (ref 3.5–5.2)
ALP SERPL-CCNC: 130 U/L (ref 55–135)
ALT SERPL W/O P-5'-P-CCNC: <5 U/L (ref 10–44)
ANION GAP SERPL CALC-SCNC: 10 MMOL/L (ref 8–16)
AST SERPL-CCNC: 12 U/L (ref 10–40)
BASOPHILS # BLD AUTO: 0.02 K/UL (ref 0–0.2)
BASOPHILS NFR BLD: 0.3 % (ref 0–1.9)
BILIRUB SERPL-MCNC: 0.2 MG/DL (ref 0.1–1)
BUN SERPL-MCNC: 9 MG/DL (ref 8–23)
CALCIUM SERPL-MCNC: 8.1 MG/DL (ref 8.7–10.5)
CHLORIDE SERPL-SCNC: 100 MMOL/L (ref 95–110)
CO2 SERPL-SCNC: 32 MMOL/L (ref 23–29)
CREAT SERPL-MCNC: 1.1 MG/DL (ref 0.5–1.4)
DIFFERENTIAL METHOD: ABNORMAL
EOSINOPHIL # BLD AUTO: 0.2 K/UL (ref 0–0.5)
EOSINOPHIL NFR BLD: 3.9 % (ref 0–8)
ERYTHROCYTE [DISTWIDTH] IN BLOOD BY AUTOMATED COUNT: 17.7 % (ref 11.5–14.5)
EST. GFR  (AFRICAN AMERICAN): >60 ML/MIN/1.73 M^2
EST. GFR  (NON AFRICAN AMERICAN): >60 ML/MIN/1.73 M^2
GLUCOSE SERPL-MCNC: 97 MG/DL (ref 70–110)
HCT VFR BLD AUTO: 37.8 % (ref 40–54)
HGB BLD-MCNC: 11.1 G/DL (ref 14–18)
IMM GRANULOCYTES # BLD AUTO: 0.03 K/UL (ref 0–0.04)
IMM GRANULOCYTES NFR BLD AUTO: 0.5 % (ref 0–0.5)
LYMPHOCYTES # BLD AUTO: 1.1 K/UL (ref 1–4.8)
LYMPHOCYTES NFR BLD: 18.4 % (ref 18–48)
MCH RBC QN AUTO: 26.9 PG (ref 27–31)
MCHC RBC AUTO-ENTMCNC: 29.4 G/DL (ref 32–36)
MCV RBC AUTO: 92 FL (ref 82–98)
MONOCYTES # BLD AUTO: 0.7 K/UL (ref 0.3–1)
MONOCYTES NFR BLD: 10.8 % (ref 4–15)
NEUTROPHILS # BLD AUTO: 4.1 K/UL (ref 1.8–7.7)
NEUTROPHILS NFR BLD: 66.1 % (ref 38–73)
NRBC BLD-RTO: 0 /100 WBC
PLATELET # BLD AUTO: 238 K/UL (ref 150–350)
PMV BLD AUTO: 10.5 FL (ref 9.2–12.9)
POTASSIUM SERPL-SCNC: 4 MMOL/L (ref 3.5–5.1)
PROT SERPL-MCNC: 6.5 G/DL (ref 6–8.4)
RBC # BLD AUTO: 4.13 M/UL (ref 4.6–6.2)
SODIUM SERPL-SCNC: 142 MMOL/L (ref 136–145)
WBC # BLD AUTO: 6.18 K/UL (ref 3.9–12.7)

## 2020-01-27 PROCEDURE — 80053 COMPREHEN METABOLIC PANEL: CPT | Mod: PO

## 2020-01-27 PROCEDURE — 85025 COMPLETE CBC W/AUTO DIFF WBC: CPT | Mod: PO

## 2020-02-03 ENCOUNTER — HOSPITAL ENCOUNTER (EMERGENCY)
Facility: HOSPITAL | Age: 70
Discharge: ANOTHER HOSPITAL | End: 2020-02-03
Attending: EMERGENCY MEDICINE

## 2020-02-03 VITALS
RESPIRATION RATE: 20 BRPM | BODY MASS INDEX: 57.78 KG/M2 | SYSTOLIC BLOOD PRESSURE: 117 MMHG | TEMPERATURE: 100 F | DIASTOLIC BLOOD PRESSURE: 67 MMHG | OXYGEN SATURATION: 94 % | WEIGHT: 315 LBS | HEART RATE: 111 BPM

## 2020-02-03 DIAGNOSIS — J81.0 ACUTE PULMONARY EDEMA: ICD-10-CM

## 2020-02-03 DIAGNOSIS — J96.01 ACUTE HYPOXEMIC RESPIRATORY FAILURE: Primary | ICD-10-CM

## 2020-02-03 DIAGNOSIS — Z72.0 TOBACCO ABUSE: ICD-10-CM

## 2020-02-03 DIAGNOSIS — R09.02 HYPOXIA: ICD-10-CM

## 2020-02-03 LAB
ALBUMIN SERPL BCP-MCNC: 2.8 G/DL (ref 3.5–5.2)
ALLENS TEST: ABNORMAL
ALLENS TEST: ABNORMAL
ALP SERPL-CCNC: 133 U/L (ref 55–135)
ALT SERPL W/O P-5'-P-CCNC: 8 U/L (ref 10–44)
ANION GAP SERPL CALC-SCNC: 7 MMOL/L (ref 8–16)
APTT BLDCRRT: 26.6 SEC (ref 21–32)
AST SERPL-CCNC: 24 U/L (ref 10–40)
BASOPHILS # BLD AUTO: 0.01 K/UL (ref 0–0.2)
BASOPHILS NFR BLD: 0.1 % (ref 0–1.9)
BILIRUB SERPL-MCNC: 0.3 MG/DL (ref 0.1–1)
BILIRUB UR QL STRIP: NEGATIVE
BNP SERPL-MCNC: 44 PG/ML (ref 0–99)
BUN SERPL-MCNC: 11 MG/DL (ref 8–23)
CALCIUM SERPL-MCNC: 8.7 MG/DL (ref 8.7–10.5)
CHLORIDE SERPL-SCNC: 101 MMOL/L (ref 95–110)
CLARITY UR REFRACT.AUTO: CLEAR
CO2 SERPL-SCNC: 34 MMOL/L (ref 23–29)
COLOR UR AUTO: YELLOW
CREAT SERPL-MCNC: 1.3 MG/DL (ref 0.5–1.4)
DELSYS: ABNORMAL
DELSYS: ABNORMAL
DIFFERENTIAL METHOD: ABNORMAL
EOSINOPHIL # BLD AUTO: 0.2 K/UL (ref 0–0.5)
EOSINOPHIL NFR BLD: 1.5 % (ref 0–8)
ERYTHROCYTE [DISTWIDTH] IN BLOOD BY AUTOMATED COUNT: 17.3 % (ref 11.5–14.5)
EST. GFR  (AFRICAN AMERICAN): >60 ML/MIN/1.73 M^2
EST. GFR  (NON AFRICAN AMERICAN): 56.1 ML/MIN/1.73 M^2
FIO2: 21
FIO2: 21
GLUCOSE SERPL-MCNC: 87 MG/DL (ref 70–110)
GLUCOSE SERPL-MCNC: 89 MG/DL (ref 70–110)
GLUCOSE SERPL-MCNC: 93 MG/DL (ref 70–110)
GLUCOSE UR QL STRIP: NEGATIVE
HCO3 UR-SCNC: 34.1 MMOL/L (ref 24–28)
HCO3 UR-SCNC: 38.2 MMOL/L (ref 24–28)
HCT VFR BLD AUTO: 35 % (ref 40–54)
HCT VFR BLD CALC: 35 %PCV (ref 36–54)
HCT VFR BLD CALC: 36 %PCV (ref 36–54)
HGB BLD-MCNC: 10.5 G/DL (ref 14–18)
HGB UR QL STRIP: NEGATIVE
IMM GRANULOCYTES # BLD AUTO: 0.04 K/UL (ref 0–0.04)
IMM GRANULOCYTES NFR BLD AUTO: 0.4 % (ref 0–0.5)
INR PPP: 1 (ref 0.8–1.2)
KETONES UR QL STRIP: NEGATIVE
LACTATE SERPL-SCNC: 0.7 MMOL/L (ref 0.5–2.2)
LEUKOCYTE ESTERASE UR QL STRIP: NEGATIVE
LYMPHOCYTES # BLD AUTO: 0.7 K/UL (ref 1–4.8)
LYMPHOCYTES NFR BLD: 7.1 % (ref 18–48)
MCH RBC QN AUTO: 26.9 PG (ref 27–31)
MCHC RBC AUTO-ENTMCNC: 30 G/DL (ref 32–36)
MCV RBC AUTO: 90 FL (ref 82–98)
MODE: ABNORMAL
MODE: ABNORMAL
MONOCYTES # BLD AUTO: 1.3 K/UL (ref 0.3–1)
MONOCYTES NFR BLD: 13.1 % (ref 4–15)
NEUTROPHILS # BLD AUTO: 7.6 K/UL (ref 1.8–7.7)
NEUTROPHILS NFR BLD: 77.8 % (ref 38–73)
NITRITE UR QL STRIP: NEGATIVE
NRBC BLD-RTO: 0 /100 WBC
PCO2 BLDA: 57.2 MMHG (ref 35–45)
PCO2 BLDA: 74.4 MMHG (ref 35–45)
PH SMN: 7.32 [PH] (ref 7.35–7.45)
PH SMN: 7.38 [PH] (ref 7.35–7.45)
PH UR STRIP: 8 [PH] (ref 5–8)
PLATELET # BLD AUTO: 222 K/UL (ref 150–350)
PMV BLD AUTO: 10.8 FL (ref 9.2–12.9)
PO2 BLDA: 16 MMHG (ref 80–100)
PO2 BLDA: 41 MMHG (ref 80–100)
POC BE: 12 MMOL/L
POC BE: 9 MMOL/L
POC IONIZED CALCIUM: 1.25 MMOL/L (ref 1.06–1.42)
POC IONIZED CALCIUM: 1.27 MMOL/L (ref 1.06–1.42)
POC SATURATED O2: 17 % (ref 95–100)
POC SATURATED O2: 74 % (ref 95–100)
POTASSIUM BLD-SCNC: 3.8 MMOL/L (ref 3.5–5.1)
POTASSIUM BLD-SCNC: 4.2 MMOL/L (ref 3.5–5.1)
POTASSIUM SERPL-SCNC: 4 MMOL/L (ref 3.5–5.1)
PROT SERPL-MCNC: 6.7 G/DL (ref 6–8.4)
PROT UR QL STRIP: NEGATIVE
PROTHROMBIN TIME: 10.3 SEC (ref 9–12.5)
RBC # BLD AUTO: 3.9 M/UL (ref 4.6–6.2)
SAMPLE: ABNORMAL
SAMPLE: ABNORMAL
SITE: ABNORMAL
SITE: ABNORMAL
SODIUM BLD-SCNC: 139 MMOL/L (ref 136–145)
SODIUM BLD-SCNC: 140 MMOL/L (ref 136–145)
SODIUM SERPL-SCNC: 142 MMOL/L (ref 136–145)
SP GR UR STRIP: 1.01 (ref 1–1.03)
TROPONIN I SERPL DL<=0.01 NG/ML-MCNC: 0.01 NG/ML (ref 0–0.03)
URN SPEC COLLECT METH UR: NORMAL
UROBILINOGEN UR STRIP-ACNC: NEGATIVE EU/DL
WBC # BLD AUTO: 9.77 K/UL (ref 3.9–12.7)

## 2020-02-03 PROCEDURE — 85730 THROMBOPLASTIN TIME PARTIAL: CPT | Mod: ER

## 2020-02-03 PROCEDURE — 93005 ELECTROCARDIOGRAM TRACING: CPT | Mod: ER

## 2020-02-03 PROCEDURE — 87186 SC STD MICRODIL/AGAR DIL: CPT

## 2020-02-03 PROCEDURE — 84295 ASSAY OF SERUM SODIUM: CPT | Mod: ER

## 2020-02-03 PROCEDURE — 96365 THER/PROPH/DIAG IV INF INIT: CPT | Mod: ER

## 2020-02-03 PROCEDURE — 85610 PROTHROMBIN TIME: CPT | Mod: ER

## 2020-02-03 PROCEDURE — 83880 ASSAY OF NATRIURETIC PEPTIDE: CPT | Mod: ER

## 2020-02-03 PROCEDURE — 87040 BLOOD CULTURE FOR BACTERIA: CPT | Mod: 59

## 2020-02-03 PROCEDURE — 85025 COMPLETE CBC W/AUTO DIFF WBC: CPT | Mod: ER

## 2020-02-03 PROCEDURE — 80053 COMPREHEN METABOLIC PANEL: CPT | Mod: ER

## 2020-02-03 PROCEDURE — 94640 AIRWAY INHALATION TREATMENT: CPT | Mod: ER

## 2020-02-03 PROCEDURE — 63600175 PHARM REV CODE 636 W HCPCS: Mod: ER | Performed by: EMERGENCY MEDICINE

## 2020-02-03 PROCEDURE — 84484 ASSAY OF TROPONIN QUANT: CPT | Mod: ER

## 2020-02-03 PROCEDURE — 84132 ASSAY OF SERUM POTASSIUM: CPT | Mod: ER

## 2020-02-03 PROCEDURE — 81003 URINALYSIS AUTO W/O SCOPE: CPT | Mod: ER

## 2020-02-03 PROCEDURE — 99291 CRITICAL CARE FIRST HOUR: CPT | Mod: 25,ER

## 2020-02-03 PROCEDURE — 96375 TX/PRO/DX INJ NEW DRUG ADDON: CPT | Mod: ER

## 2020-02-03 PROCEDURE — 82800 BLOOD PH: CPT | Mod: ER

## 2020-02-03 PROCEDURE — 93010 ELECTROCARDIOGRAM REPORT: CPT | Mod: ,,, | Performed by: INTERNAL MEDICINE

## 2020-02-03 PROCEDURE — 85014 HEMATOCRIT: CPT | Mod: ER

## 2020-02-03 PROCEDURE — 36600 WITHDRAWAL OF ARTERIAL BLOOD: CPT | Mod: ER

## 2020-02-03 PROCEDURE — 82803 BLOOD GASES ANY COMBINATION: CPT | Mod: ER

## 2020-02-03 PROCEDURE — 93010 EKG 12-LEAD: ICD-10-PCS | Mod: ,,, | Performed by: INTERNAL MEDICINE

## 2020-02-03 PROCEDURE — 27000221 HC OXYGEN, UP TO 24 HOURS: Mod: ER

## 2020-02-03 PROCEDURE — 99900035 HC TECH TIME PER 15 MIN (STAT): Mod: ER

## 2020-02-03 PROCEDURE — 25000242 PHARM REV CODE 250 ALT 637 W/ HCPCS: Mod: ER | Performed by: EMERGENCY MEDICINE

## 2020-02-03 PROCEDURE — 83605 ASSAY OF LACTIC ACID: CPT | Mod: ER

## 2020-02-03 PROCEDURE — 82330 ASSAY OF CALCIUM: CPT | Mod: ER

## 2020-02-03 RX ORDER — VANCOMYCIN HCL IN 5 % DEXTROSE 1G/250ML
1000 PLASTIC BAG, INJECTION (ML) INTRAVENOUS
Status: DISCONTINUED | OUTPATIENT
Start: 2020-02-03 | End: 2020-02-03 | Stop reason: HOSPADM

## 2020-02-03 RX ORDER — VALSARTAN 40 MG/1
26 TABLET ORAL 2 TIMES DAILY
COMMUNITY
End: 2021-10-04

## 2020-02-03 RX ORDER — IPRATROPIUM BROMIDE AND ALBUTEROL SULFATE 2.5; .5 MG/3ML; MG/3ML
3 SOLUTION RESPIRATORY (INHALATION)
Status: COMPLETED | OUTPATIENT
Start: 2020-02-03 | End: 2020-02-03

## 2020-02-03 RX ORDER — TIOTROPIUM BROMIDE 18 UG/1
18 CAPSULE ORAL; RESPIRATORY (INHALATION) DAILY
COMMUNITY

## 2020-02-03 RX ORDER — GABAPENTIN 300 MG/1
300 CAPSULE ORAL NIGHTLY
COMMUNITY
End: 2021-10-04

## 2020-02-03 RX ORDER — NAPROXEN SODIUM 220 MG/1
81 TABLET, FILM COATED ORAL DAILY
Status: ON HOLD | COMMUNITY
End: 2022-01-01 | Stop reason: SDUPTHER

## 2020-02-03 RX ORDER — ACETAMINOPHEN 500 MG
500 TABLET ORAL 3 TIMES DAILY
COMMUNITY
End: 2022-01-01

## 2020-02-03 RX ORDER — SILDENAFIL 50 MG/1
50 TABLET, FILM COATED ORAL WEEKLY
COMMUNITY
End: 2021-10-04

## 2020-02-03 RX ORDER — FERROUS SULFATE 325(65) MG
325 TABLET, DELAYED RELEASE (ENTERIC COATED) ORAL
COMMUNITY
End: 2022-01-01 | Stop reason: DRUGHIGH

## 2020-02-03 RX ORDER — LIDOCAINE 50 MG/G
1 PATCH TOPICAL DAILY
COMMUNITY

## 2020-02-03 RX ORDER — SENNOSIDES 8.6 MG/1
1 TABLET ORAL NIGHTLY
COMMUNITY
End: 2021-10-04

## 2020-02-03 RX ORDER — METFORMIN HYDROCHLORIDE 500 MG/1
500 TABLET, FILM COATED, EXTENDED RELEASE ORAL
COMMUNITY
End: 2022-01-01 | Stop reason: DRUGHIGH

## 2020-02-03 RX ORDER — ALBUTEROL SULFATE 5 MG/ML
2.5 SOLUTION RESPIRATORY (INHALATION) EVERY 6 HOURS PRN
COMMUNITY

## 2020-02-03 RX ORDER — HYDRALAZINE HYDROCHLORIDE 25 MG/1
25 TABLET, FILM COATED ORAL 3 TIMES DAILY
COMMUNITY
End: 2020-08-19

## 2020-02-03 RX ORDER — MEROPENEM AND SODIUM CHLORIDE 1 G/50ML
1 INJECTION, SOLUTION INTRAVENOUS
Status: DISPENSED | OUTPATIENT
Start: 2020-02-03 | End: 2020-02-03

## 2020-02-03 RX ORDER — CLOPIDOGREL BISULFATE 75 MG/1
75 TABLET ORAL DAILY
Status: ON HOLD | COMMUNITY
End: 2022-01-01 | Stop reason: SDUPTHER

## 2020-02-03 RX ORDER — ESCITALOPRAM OXALATE 5 MG/1
5 TABLET ORAL NIGHTLY
COMMUNITY
End: 2022-01-01 | Stop reason: DRUGHIGH

## 2020-02-03 RX ORDER — ATORVASTATIN CALCIUM 80 MG/1
80 TABLET, FILM COATED ORAL DAILY
COMMUNITY
End: 2021-10-04

## 2020-02-03 RX ORDER — FUROSEMIDE 10 MG/ML
40 INJECTION INTRAMUSCULAR; INTRAVENOUS
Status: COMPLETED | OUTPATIENT
Start: 2020-02-03 | End: 2020-02-03

## 2020-02-03 RX ORDER — LANOLIN ALCOHOL/MO/W.PET/CERES
100 CREAM (GRAM) TOPICAL DAILY
COMMUNITY
End: 2022-01-01 | Stop reason: DRUGHIGH

## 2020-02-03 RX ORDER — BUMETANIDE 2 MG/1
2 TABLET ORAL DAILY
Status: ON HOLD | COMMUNITY
End: 2021-02-22 | Stop reason: HOSPADM

## 2020-02-03 RX ORDER — ERGOCALCIFEROL 1.25 MG/1
50000 CAPSULE ORAL
COMMUNITY

## 2020-02-03 RX ORDER — BUDESONIDE AND FORMOTEROL FUMARATE DIHYDRATE 160; 4.5 UG/1; UG/1
2 AEROSOL RESPIRATORY (INHALATION) EVERY 12 HOURS
COMMUNITY

## 2020-02-03 RX ADMIN — IPRATROPIUM BROMIDE AND ALBUTEROL SULFATE 3 ML: .5; 2.5 SOLUTION RESPIRATORY (INHALATION) at 02:02

## 2020-02-03 RX ADMIN — MEROPENEM AND SODIUM CHLORIDE 1 G: 1 INJECTION, SOLUTION INTRAVENOUS at 03:02

## 2020-02-03 RX ADMIN — FUROSEMIDE 40 MG: 10 INJECTION, SOLUTION INTRAMUSCULAR; INTRAVENOUS at 02:02

## 2020-02-03 NOTE — ED NOTES
Bed: 09  Expected date: 2/3/20  Expected time: 1:07 PM  Means of arrival: Ambulance Service  Comments:  70 y/o male with SOB, SpO2 of 86%     Omar Carter NP  02/03/20 9307

## 2020-02-03 NOTE — ED NOTES
Pt presented to the ED via EMS due to SOB, specifically on exertion. Pt is not on home O2, arrived on 2 lpm NC.  Pt's breathing was labored and uneven upon arrival. Pt's bilateral lower extremities including feet and ankles +3 edema, non weeping. Pt has an extensive cardiac hx, hx of infected AICD, since removed, life vest which pt is not wearing at this time, did not bring to the ED. Pt has difficulty ambulating and standing due to extensive edema, uses care by hx. Pt's BP wnl, low tachycardia, stats in upper 80s on RA. Pt has a MILDRED double lumen PICC that has Cefeazolin infusing at 12.5ml per hour. Pt reports that he receives the 300ml of the medication over a 24 hour period.

## 2020-02-03 NOTE — ED PROVIDER NOTES
"Encounter Date: 2/3/2020       History     Chief Complaint   Patient presents with    Shortness of Breath     70 y/o M with PMH of CAD, CHF with recent removal of ICD due to infected lead and bacteremia and subsequent osteomyelitis presents to the ED with c/o SOB this AM after smoking several cigarettes back to back. This is constant, moderate, and when home health nurse came today, patient had oxygen sats of 86%. Patient is to received last day of IV abx tomorrow, and still has PICC line in right arm. Patient reporting his usual leg swelling. Denies any chest pain. Supposed to be wearing life vest, has not worn in several days.         Review of patient's allergies indicates:   Allergen Reactions    Asa [aspirin]      Pt states "uncoated" ASA     Past Medical History:   Diagnosis Date    AICD generator infection     Anemia     Anticoagulant long-term use     Arthritis     CAD (coronary artery disease)     CHF (congestive heart failure)     Chronic pain     COPD (chronic obstructive pulmonary disease)     Diabetes mellitus     Erectile dysfunction     Hyperlipemia     Hypertension     Neuropathy     Vitamin deficiency      Past Surgical History:   Procedure Laterality Date    CARDIAC SURGERY      CARPAL TUNNEL RELEASE      CORONARY ANGIOPLASTY WITH STENT PLACEMENT       Family History   Problem Relation Age of Onset    Heart disease Mother     Cancer Father      Social History     Tobacco Use    Smoking status: Current Every Day Smoker     Packs/day: 1.00     Types: Cigarettes    Smokeless tobacco: Never Used   Substance Use Topics    Alcohol use: Yes     Comment: very little    Drug use: No     Review of Systems   Constitutional: Negative for chills and fever.   HENT: Negative for congestion and dental problem.    Eyes: Negative for pain and visual disturbance.   Respiratory: Positive for shortness of breath. Negative for cough.    Cardiovascular: Positive for leg swelling. Negative for " chest pain and palpitations.   Gastrointestinal: Negative for abdominal pain, diarrhea, nausea and vomiting.   Genitourinary: Negative for dysuria and flank pain.   Musculoskeletal: Negative for back pain and neck pain.   Skin: Negative for rash and wound.   Neurological: Negative for weakness, numbness and headaches.       Physical Exam     Initial Vitals [02/03/20 1316]   BP Pulse Resp Temp SpO2   (!) 116/92 (!) 115 (!) 22 99.9 °F (37.7 °C) (!) 92 %      MAP       --         Physical Exam    Constitutional: He appears well-developed and well-nourished. No distress.   HENT:   Head: Normocephalic and atraumatic.   Mouth/Throat: Oropharynx is clear and moist.   Eyes: EOM are normal. Pupils are equal, round, and reactive to light.   Neck: Normal range of motion. Neck supple.   Cardiovascular: Normal rate and regular rhythm.   PICC line in right arm without external signs of inflammation or infection.    Pulmonary/Chest: No respiratory distress. He has wheezes. He has rales.   Wheezing and Rales present globally.    Abdominal: He exhibits no distension. There is no tenderness.   Musculoskeletal: Normal range of motion. He exhibits edema. He exhibits no tenderness.        Right lower leg: He exhibits edema.        Left lower leg: He exhibits edema.   BLE's with 4+ pitting edema.    Neurological: He is alert and oriented to person, place, and time.   Skin: Skin is warm and dry.   Psychiatric: He has a normal mood and affect.         ED Course   Critical Care  Date/Time: 2/3/2020 2:49 PM  Performed by: Isma Massey MD  Authorized by: Isma Massey MD   Direct patient critical care time: 5 minutes  Additional history critical care time: 7 minutes  Ordering / reviewing critical care time: 6 minutes  Documentation critical care time: 7 minutes  Consulting other physicians critical care time: 6 minutes  Consult with family critical care time: 5 minutes  Other critical care time: 0 minutes  Total critical care time  (exclusive of procedural time) : 36 minutes  Critical care time was exclusive of separately billable procedures and treating other patients and teaching time.  Critical care was necessary to treat or prevent imminent or life-threatening deterioration of the following conditions: respiratory failure.  Critical care was time spent personally by me on the following activities: blood draw for specimens, discussions with consultants, evaluation of patient's response to treatment, obtaining history from patient or surrogate, ordering and review of laboratory studies, pulse oximetry, review of old charts, development of treatment plan with patient or surrogate, interpretation of cardiac output measurements, examination of patient, ordering and performing treatments and interventions, ordering and review of radiographic studies and re-evaluation of patient's condition.        Labs Reviewed   CBC W/ AUTO DIFFERENTIAL - Abnormal; Notable for the following components:       Result Value    RBC 3.90 (*)     Hemoglobin 10.5 (*)     Hematocrit 35.0 (*)     Mean Corpuscular Hemoglobin 26.9 (*)     Mean Corpuscular Hemoglobin Conc 30.0 (*)     RDW 17.3 (*)     Lymph # 0.7 (*)     Mono # 1.3 (*)     Gran% 77.8 (*)     Lymph% 7.1 (*)     All other components within normal limits   COMPREHENSIVE METABOLIC PANEL - Abnormal; Notable for the following components:    CO2 34 (*)     Albumin 2.8 (*)     ALT 8 (*)     Anion Gap 7 (*)     eGFR if non  56.1 (*)     All other components within normal limits   ISTAT PROCEDURE - Abnormal; Notable for the following components:    POC PH 7.318 (*)     POC PCO2 74.4 (*)     POC PO2 16 (*)     POC HCO3 38.2 (*)     POC SATURATED O2 17 (*)     All other components within normal limits   ISTAT PROCEDURE - Abnormal; Notable for the following components:    POC PCO2 57.2 (*)     POC PO2 41 (*)     POC HCO3 34.1 (*)     POC SATURATED O2 74 (*)     POC Hematocrit 35 (*)     All other  components within normal limits   CULTURE, BLOOD   CULTURE, BLOOD   B-TYPE NATRIURETIC PEPTIDE   TROPONIN I   LACTIC ACID, PLASMA   APTT   PROTIME-INR   URINALYSIS, REFLEX TO URINE CULTURE    Narrative:     Preferred Collection Type->Urine, Clean Catch     EKG Readings: (Independently Interpreted)   Sinus tachycardia at a rate of 114 beats per minute. Normal axis.  P.r., QRS and QTC intervals within normal limits. No STEMI.     ECG Results          EKG 12-lead (In process)  Result time 02/03/20 13:55:51    In process by Interface, Lab In [facility] (02/03/20 13:55:51)                 Narrative:    Test Reason : R09.02,    Vent. Rate : 114 BPM     Atrial Rate : 114 BPM     P-R Int : 138 ms          QRS Dur : 088 ms      QT Int : 334 ms       P-R-T Axes : 044 031 -40 degrees     QTc Int : 460 ms    Sinus tachycardia  Anterolateral infarct (cited on or before 11-AUG-2018)  Abnormal ECG  When compared with ECG of 11-AUG-2018 16:17,  No significant change was found    Referred By:  SAM           Confirmed By:                             Imaging Results          X-Ray Chest AP Portable (Final result)  Result time 02/03/20 13:39:54    Final result by Khoa Yusuf MD (02/03/20 13:39:54)                 Impression:      Pulmonary vascular congestion and trace interstitial edema.      Electronically signed by: Khoa Yusuf  Date:    02/03/2020  Time:    13:39             Narrative:    EXAMINATION:  XR CHEST AP PORTABLE    CLINICAL HISTORY:  Hypoxia;.    TECHNIQUE:  Single frontal portable view of the chest was performed.    COMPARISON:  10/03/2017    FINDINGS:  Support devices: Right-sided PICC line in satisfactory position.    Pulmonary vascular congestion and trace interstitial edema.  No appreciable pleural effusion.    The cardiac silhouette is normal in size. The hilar and mediastinal contours are unremarkable.    Bones are intact.                                                   ED Course as of Feb 03 1554 Mon Feb  03, 2020   1450 Patient with edema on legs and on CXR, will give lasix. Patient without BNP elevation, but given his obesity this may be falsely low vs an alternative process occurring. Patient is stable on nasal cannula oxygen, but requires hospitalization due to his new oxygen demands. He prefers to go to [facility] as he was recently hospitalized there.     [BA]   5929 All historical, clinical, radiographic, and laboratory findings were reviewed with the patient/family in detail along with the indications for transfer to an outside facility (rather than admission to our facility in [place]) secondary to patient preference and a need for  hospital admission, ID consultation, and further testing given the diagnosis of acute hypoxemic respiratory failure with recent osteomyelitis and sepsis.  All remaining questions and concerns were addressed at that time and the patient/family communicates understanding and agrees to proceed accordingly.  Similarly all pertinent details of the encounter were discussed with Dr. Rendon at [facility] via [facility] who agrees to accept the patient in transfer based on the needs/patient preferences outlined above.  [facility] secondary to a need for ongoing cardiac monitoring and IVF en route.  Isma Massey MD  3:53 PM          [BA]      ED Course User Index  [BA] Isma Massey MD                Clinical Impression:       ICD-10-CM ICD-9-CM   1. Acute hypoxemic respiratory failure J96.01 518.81   2. Hypoxia R09.02 799.02   3. Tobacco abuse Z72.0 305.1   4. Acute pulmonary edema J81.0 518.4         Disposition:   Disposition: Transferred  Condition: Fair                     Isma Massey MD  02/03/20 1554

## 2020-02-05 ENCOUNTER — TELEPHONE (OUTPATIENT)
Dept: EMERGENCY MEDICINE | Facility: HOSPITAL | Age: 70
End: 2020-02-05

## 2020-02-08 LAB — BACTERIA BLD CULT: NORMAL

## 2020-02-09 LAB
BACTERIA BLD CULT: ABNORMAL

## 2020-07-22 ENCOUNTER — HOSPITAL ENCOUNTER (EMERGENCY)
Facility: HOSPITAL | Age: 70
Discharge: HOME | End: 2020-07-23
Attending: EMERGENCY MEDICINE

## 2020-07-22 VITALS
OXYGEN SATURATION: 97 % | DIASTOLIC BLOOD PRESSURE: 65 MMHG | TEMPERATURE: 98 F | HEIGHT: 67 IN | RESPIRATION RATE: 18 BRPM | WEIGHT: 290 LBS | SYSTOLIC BLOOD PRESSURE: 110 MMHG | HEART RATE: 98 BPM | BODY MASS INDEX: 45.52 KG/M2

## 2020-07-22 DIAGNOSIS — R60.1 ANASARCA: Primary | ICD-10-CM

## 2020-07-22 DIAGNOSIS — N50.89 SCROTAL EDEMA: ICD-10-CM

## 2020-07-22 PROCEDURE — 99284 EMERGENCY DEPT VISIT MOD MDM: CPT | Mod: 25,ER

## 2020-07-22 PROCEDURE — 63600175 PHARM REV CODE 636 W HCPCS: Mod: ER | Performed by: EMERGENCY MEDICINE

## 2020-07-22 PROCEDURE — 96374 THER/PROPH/DIAG INJ IV PUSH: CPT | Mod: ER

## 2020-07-22 RX ORDER — FUROSEMIDE 10 MG/ML
60 INJECTION INTRAMUSCULAR; INTRAVENOUS
Status: COMPLETED | OUTPATIENT
Start: 2020-07-22 | End: 2020-07-22

## 2020-07-22 RX ADMIN — FUROSEMIDE 60 MG: 10 INJECTION, SOLUTION INTRAMUSCULAR; INTRAVENOUS at 11:07

## 2020-07-23 RX ORDER — METOLAZONE 2.5 MG/1
5 TABLET ORAL DAILY
Qty: 10 TABLET | Refills: 0 | Status: ON HOLD | OUTPATIENT
Start: 2020-07-23 | End: 2021-02-22 | Stop reason: HOSPADM

## 2020-07-23 NOTE — ED PROVIDER NOTES
"Encounter Date: 7/22/2020       History     Chief Complaint   Patient presents with    Groin Swelling     scrotal swelling and pain worsening x1 week.      Chief complaint scrotal swelling    History of present illness:  This is a 70-year-old male with complaints of scrotal swelling for several months.  [facility] and states he was seen last week in his told to increase his diuretic twofold.  He states he continues to have scrotal pain and swelling.  He had an ultrasound done at this previous visit and was told that it was fluid.  Pain is exacerbated by the dependent position of the scrotum.  Patient also complains of bilateral leg edema.  He states he is not having any salt to his diet.  Patient not aware of need for sodium requirement S for calculating his sodium intake.  No trauma to the area.  No symptoms of increasing shortness of breath.  Location of the discomfort is his scrotum.  Duration of the discomfort is for several months with recent exacerbation.        Review of patient's allergies indicates:   Allergen Reactions    Asa [aspirin]      Pt states "uncoated" ASA     Past Medical History:   Diagnosis Date    AICD generator infection     Anemia     Anticoagulant long-term use     Arthritis     CAD (coronary artery disease)     CHF (congestive heart failure)     Chronic pain     COPD (chronic obstructive pulmonary disease)     Diabetes mellitus     Erectile dysfunction     Hyperlipemia     Hypertension     Neuropathy     Vitamin deficiency      Past Surgical History:   Procedure Laterality Date    CARDIAC SURGERY      CARPAL TUNNEL RELEASE      CORONARY ANGIOPLASTY WITH STENT PLACEMENT       Family History   Problem Relation Age of Onset    Heart disease Mother     Cancer Father      Social History     Tobacco Use    Smoking status: Current Every Day Smoker     Packs/day: 1.00     Types: Cigarettes    Smokeless tobacco: Never Used   Substance Use " Topics    Alcohol use: Yes     Comment: very little    Drug use: No     Review of Systems   Constitutional: Negative for activity change and appetite change.   HENT: Negative.    Respiratory: Negative for chest tightness and shortness of breath (No acute changes).    Cardiovascular: Positive for leg swelling ( chronic bilateral lower legs). Negative for chest pain.   Gastrointestinal: Negative for abdominal distention, abdominal pain, nausea and vomiting.   Endocrine: Negative for polydipsia and polyuria.   Genitourinary: Positive for scrotal swelling. Negative for decreased urine volume, difficulty urinating, discharge, dysuria, enuresis, flank pain, frequency, genital sores, hematuria, penile pain, penile swelling, testicular pain and urgency.   Musculoskeletal: Positive for gait problem.   Psychiatric/Behavioral: Negative.  Agitation:  secondary to the scrotal swelling.   All other systems reviewed and are negative.      Physical Exam     Initial Vitals [07/22/20 2304]   BP Pulse Resp Temp SpO2   110/65 100 18 97.7 °F (36.5 °C) 97 %      MAP       --         Physical Exam    Constitutional: He appears well-nourished. No distress.   HENT:   Head: Normocephalic and atraumatic.   Right Ear: External ear normal.   Left Ear: External ear normal.   Nose: Nose normal.   Mouth/Throat: Oropharynx is clear and moist.   Eyes: EOM are normal. Right eye exhibits no discharge. Left eye exhibits no discharge. No scleral icterus.   Neck: Normal range of motion.   Cardiovascular: Normal rate, regular rhythm and intact distal pulses.   Pulmonary/Chest: No respiratory distress. He has no wheezes. He has no rhonchi. He has no rales.   Abdominal: Soft. He exhibits no distension. There is no abdominal tenderness.   Genitourinary:    Genitourinary Comments: Significant scrotal edema is noted     Musculoskeletal: Edema (Bilateral lower extremity) present. No tenderness.   Neurological: He is alert and oriented to person, place, and  time.   Skin: Skin is warm and dry. Capillary refill takes 2 to 3 seconds.   Psychiatric: He has a normal mood and affect. His behavior is normal.         ED Course   Procedures  Labs Reviewed - No data to display       Imaging Results    None          Medical Decision Making:   ED Management:  Edema is chronic and patient has questionable compliance with salt intake. Advised to take Zaroxolyn prescribed for 5 days only and to see his PCP for recheck and longterm therapy. Pt had brisk response to Lasix and told to continue all other meds as prescribed.                                 Clinical Impression:                  ICD-10-CM ICD-9-CM   1. Anasarca  R60.1 782.3   2. Scrotal edema  N50.89 608.86          Dinesh Patel MD  07/23/20 1157

## 2020-08-19 ENCOUNTER — HOSPITAL ENCOUNTER (EMERGENCY)
Facility: HOSPITAL | Age: 70
Discharge: HOME | End: 2020-08-19
Attending: EMERGENCY MEDICINE

## 2020-08-19 VITALS
RESPIRATION RATE: 25 BRPM | HEIGHT: 68 IN | OXYGEN SATURATION: 92 % | TEMPERATURE: 99 F | SYSTOLIC BLOOD PRESSURE: 111 MMHG | BODY MASS INDEX: 41.39 KG/M2 | WEIGHT: 273.13 LBS | DIASTOLIC BLOOD PRESSURE: 65 MMHG | HEART RATE: 89 BPM

## 2020-08-19 DIAGNOSIS — R29.90 STROKE-LIKE SYMPTOM: Primary | ICD-10-CM

## 2020-08-19 DIAGNOSIS — R29.818 TRANSIENT NEUROLOGIC DEFICIT: ICD-10-CM

## 2020-08-19 DIAGNOSIS — R20.0 LEFT ARM NUMBNESS: ICD-10-CM

## 2020-08-19 LAB
ALBUMIN SERPL BCP-MCNC: 2.9 G/DL (ref 3.5–5.2)
ALP SERPL-CCNC: 111 U/L (ref 55–135)
ALT SERPL W/O P-5'-P-CCNC: 9 U/L (ref 10–44)
ANION GAP SERPL CALC-SCNC: 11 MMOL/L (ref 8–16)
ANISOCYTOSIS BLD QL SMEAR: SLIGHT
APTT BLDCRRT: 26.2 SEC (ref 21–32)
AST SERPL-CCNC: 10 U/L (ref 10–40)
BASOPHILS NFR BLD: 0 % (ref 0–1.9)
BILIRUB SERPL-MCNC: 0.5 MG/DL (ref 0.1–1)
BNP SERPL-MCNC: 21 PG/ML (ref 0–99)
BUN SERPL-MCNC: 13 MG/DL (ref 8–23)
CALCIUM SERPL-MCNC: 8.9 MG/DL (ref 8.7–10.5)
CHLORIDE SERPL-SCNC: 99 MMOL/L (ref 95–110)
CO2 SERPL-SCNC: 28 MMOL/L (ref 23–29)
CREAT SERPL-MCNC: 1.8 MG/DL (ref 0.5–1.4)
DACRYOCYTES BLD QL SMEAR: ABNORMAL
DIFFERENTIAL METHOD: ABNORMAL
EOSINOPHIL NFR BLD: 7 % (ref 0–8)
ERYTHROCYTE [DISTWIDTH] IN BLOOD BY AUTOMATED COUNT: 17.1 % (ref 11.5–14.5)
EST. GFR  (AFRICAN AMERICAN): 43.4 ML/MIN/1.73 M^2
EST. GFR  (NON AFRICAN AMERICAN): 37.6 ML/MIN/1.73 M^2
GLUCOSE SERPL-MCNC: 105 MG/DL (ref 70–110)
HCT VFR BLD AUTO: 37.6 % (ref 40–54)
HGB BLD-MCNC: 11.6 G/DL (ref 14–18)
HYPOCHROMIA BLD QL SMEAR: ABNORMAL
IMM GRANULOCYTES # BLD AUTO: ABNORMAL K/UL (ref 0–0.04)
IMM GRANULOCYTES NFR BLD AUTO: ABNORMAL % (ref 0–0.5)
INR PPP: 1 (ref 0.8–1.2)
LYMPHOCYTES NFR BLD: 8 % (ref 18–48)
MCH RBC QN AUTO: 24.7 PG (ref 27–31)
MCHC RBC AUTO-ENTMCNC: 30.9 G/DL (ref 32–36)
MCV RBC AUTO: 80 FL (ref 82–98)
MONOCYTES NFR BLD: 8 % (ref 4–15)
NEUTROPHILS NFR BLD: 77 % (ref 38–73)
NRBC BLD-RTO: 0 /100 WBC
PLATELET # BLD AUTO: 209 K/UL (ref 150–350)
PMV BLD AUTO: 10.4 FL (ref 9.2–12.9)
POIKILOCYTOSIS BLD QL SMEAR: SLIGHT
POTASSIUM SERPL-SCNC: 3.2 MMOL/L (ref 3.5–5.1)
PROT SERPL-MCNC: 7.4 G/DL (ref 6–8.4)
PROTHROMBIN TIME: 10.7 SEC (ref 9–12.5)
RBC # BLD AUTO: 4.7 M/UL (ref 4.6–6.2)
SODIUM SERPL-SCNC: 138 MMOL/L (ref 136–145)
STOMATOCYTES BLD QL SMEAR: PRESENT
TARGETS BLD QL SMEAR: ABNORMAL
TROPONIN I SERPL DL<=0.01 NG/ML-MCNC: 0.05 NG/ML (ref 0–0.03)
WBC # BLD AUTO: 8.64 K/UL (ref 3.9–12.7)

## 2020-08-19 PROCEDURE — 99285 EMERGENCY DEPT VISIT HI MDM: CPT | Mod: 25,ER

## 2020-08-19 PROCEDURE — 85610 PROTHROMBIN TIME: CPT | Mod: ER

## 2020-08-19 PROCEDURE — 85730 THROMBOPLASTIN TIME PARTIAL: CPT | Mod: ER

## 2020-08-19 PROCEDURE — 93010 EKG 12-LEAD: ICD-10-PCS | Mod: ,,, | Performed by: INTERNAL MEDICINE

## 2020-08-19 PROCEDURE — 96360 HYDRATION IV INFUSION INIT: CPT | Mod: ER

## 2020-08-19 PROCEDURE — 25000003 PHARM REV CODE 250: Mod: ER | Performed by: EMERGENCY MEDICINE

## 2020-08-19 PROCEDURE — 84484 ASSAY OF TROPONIN QUANT: CPT | Mod: ER

## 2020-08-19 PROCEDURE — 83880 ASSAY OF NATRIURETIC PEPTIDE: CPT | Mod: ER

## 2020-08-19 PROCEDURE — 93010 ELECTROCARDIOGRAM REPORT: CPT | Mod: ,,, | Performed by: INTERNAL MEDICINE

## 2020-08-19 PROCEDURE — 80053 COMPREHEN METABOLIC PANEL: CPT | Mod: ER

## 2020-08-19 PROCEDURE — 85025 COMPLETE CBC W/AUTO DIFF WBC: CPT | Mod: ER

## 2020-08-19 PROCEDURE — 93005 ELECTROCARDIOGRAM TRACING: CPT | Mod: ER

## 2020-08-19 RX ORDER — POTASSIUM CHLORIDE 20 MEQ/1
40 TABLET, EXTENDED RELEASE ORAL
Status: COMPLETED | OUTPATIENT
Start: 2020-08-19 | End: 2020-08-19

## 2020-08-19 RX ADMIN — SODIUM CHLORIDE 250 ML: 0.9 INJECTION, SOLUTION INTRAVENOUS at 09:08

## 2020-08-19 RX ADMIN — POTASSIUM CHLORIDE 40 MEQ: 1500 TABLET, EXTENDED RELEASE ORAL at 11:08

## 2020-08-20 ENCOUNTER — PES CALL (OUTPATIENT)
Dept: ADMINISTRATIVE | Facility: CLINIC | Age: 70
End: 2020-08-20

## 2020-08-20 NOTE — ED NOTES
Pt   AAOx3, walking from W/C to Bed. Denies chest pain, SOB or numbness at present time. Neuro checks performed without deficits.

## 2020-08-20 NOTE — ED PROVIDER NOTES
"Encounter Date: 8/19/2020       History     Chief Complaint   Patient presents with    Numbness     Numbness Left hand x 2; spitting up clear liquid.      Fransisco Durand is a 70 y.o. male who presents with two days of sudden onset, mild to moderate, nonradiating left arm and hand numbness at home that started yesterday morning a few hours after awakening. Prior to this, he felt in his usual state of health. He notes he's had his diuretic increased and then decreased again over the past month as he had scrotal swelling, but his nephrologist noted that the diuretic was increased too much.  He has had no prior episodes.  He has no other complaints; no weakness, aphasia, or other numbness.  The symptoms are resolved at this time.  Of note he states aspirin, Plavix, a statin, and has had a carotid artery evaluation in the past which he states did not require any intervention.          Review of patient's allergies indicates:   Allergen Reactions    Asa [aspirin]      Pt states "uncoated" ASA     Past Medical History:   Diagnosis Date    AICD generator infection     Anemia     Anticoagulant long-term use     Arthritis     CAD (coronary artery disease)     CHF (congestive heart failure)     Chronic pain     COPD (chronic obstructive pulmonary disease)     Diabetes mellitus     Erectile dysfunction     Hyperlipemia     Hypertension     Neuropathy     Vitamin deficiency      Past Surgical History:   Procedure Laterality Date    CARDIAC SURGERY      CARPAL TUNNEL RELEASE      CORONARY ANGIOPLASTY WITH STENT PLACEMENT       Family History   Problem Relation Age of Onset    Heart disease Mother     Cancer Father      Social History     Tobacco Use    Smoking status: Current Every Day Smoker     Packs/day: 1.00     Types: Cigarettes    Smokeless tobacco: Never Used   Substance Use Topics    Alcohol use: Yes     Comment: very little    Drug use: No     Review of Systems   Constitutional: Negative.  " Negative for fever.   HENT: Negative.    Eyes: Negative.  Negative for visual disturbance.   Respiratory: Negative.    Cardiovascular: Positive for leg swelling.   Gastrointestinal: Negative.    Endocrine: Negative.    Genitourinary: Positive for scrotal swelling.   Musculoskeletal: Negative.    Skin: Negative.    Allergic/Immunologic: Negative.    Neurological: Positive for numbness (left arm only). Negative for dizziness, facial asymmetry, speech difficulty, weakness and headaches.   Hematological: Negative.    Psychiatric/Behavioral: Negative.    All other systems reviewed and are negative.      Physical Exam     Initial Vitals [08/19/20 2036]   BP Pulse Resp Temp SpO2   (!) 96/51 99 20 97.9 °F (36.6 °C) (!) 94 %      MAP       --         Physical Exam    Nursing note and vitals reviewed.  Constitutional: He appears well-developed and well-nourished. No distress.   HENT:   Head: Normocephalic and atraumatic.   Eyes: Conjunctivae and EOM are normal. Pupils are equal, round, and reactive to light.   Neck: Neck supple.   Cardiovascular: Normal rate, regular rhythm, normal heart sounds and intact distal pulses.   Pulmonary/Chest: Breath sounds normal. No respiratory distress.   Abdominal: Soft. He exhibits no distension. There is no abdominal tenderness.   Musculoskeletal: Normal range of motion. Edema (scrotal and leg edema) present.   Neurological: He is alert and oriented to person, place, and time. He has normal strength. No cranial nerve deficit or sensory deficit. GCS score is 15. GCS eye subscore is 4. GCS verbal subscore is 5. GCS motor subscore is 6.   NIHSS=0   Skin: Skin is warm and dry. Capillary refill takes less than 2 seconds.   Psychiatric: He has a normal mood and affect. His behavior is normal. Judgment and thought content normal.         ED Course   Procedures  Labs Reviewed   CBC W/ AUTO DIFFERENTIAL - Abnormal; Notable for the following components:       Result Value    Hemoglobin 11.6 (*)      Hematocrit 37.6 (*)     Mean Corpuscular Volume 80 (*)     Mean Corpuscular Hemoglobin 24.7 (*)     Mean Corpuscular Hemoglobin Conc 30.9 (*)     RDW 17.1 (*)     Gran% 77.0 (*)     Lymph% 8.0 (*)     All other components within normal limits   COMPREHENSIVE METABOLIC PANEL - Abnormal; Notable for the following components:    Potassium 3.2 (*)     Creatinine 1.8 (*)     Albumin 2.9 (*)     ALT 9 (*)     eGFR if  43.4 (*)     eGFR if non  37.6 (*)     All other components within normal limits   TROPONIN I - Abnormal; Notable for the following components:    Troponin I 0.046 (*)     All other components within normal limits   B-TYPE NATRIURETIC PEPTIDE   PROTIME-INR   APTT          Imaging Results          X-Ray Chest AP Portable (Final result)  Result time 08/19/20 22:29:57    Final result by Hoang Rodgers MD (08/19/20 22:29:57)                 Impression:      See above      Electronically signed by: Hoang Rodgers MD  Date:    08/19/2020  Time:    22:29             Narrative:    EXAMINATION:  XR CHEST AP PORTABLE    CLINICAL HISTORY:  Anesthesia of skin    COMPARISON:  Chest x-ray, February 3, 2020    FINDINGS:  There is cardiomegaly.  There is coarsening of the bronchovascular interstitium.  Lung volumes are lower on today's exam.  No floyd consolidation.  No pleural effusion or pneumothorax.                               CT Head Without Contrast (Final result)  Result time 08/19/20 22:19:36    Final result by Hoang Rodgers MD (08/19/20 22:19:36)                 Impression:      No CT evidence of acute intracranial abnormality.    All CT scans at this facility are performed  using dose modulation techniques as appropriate to performed exam including the following:  automated exposure control; adjustment of mA and/or kV according to the patients size (this includes techniques or standardized protocols for targeted exams where dose is matched to indication/reason for exam: i.e.  "extremities or head);  iterative reconstruction technique.      Electronically signed by: Hoang Rodgers MD  Date:    08/19/2020  Time:    22:19             Narrative:    EXAMINATION:  CT HEAD WITHOUT CONTRAST    CLINICAL HISTORY:  transient left arm numbness;    TECHNIQUE:  Axial CT imaging was performed through the head without intravenous contrast.    COMPARISON:  None    FINDINGS:  No hydrocephalus, midline shift, mass effect, or acute intracranial hemorrhage. Cortical sulcal pattern and gray-white matter differentiation is normal. There is mild chronic small vessel ischemic changes of the white matter.  The visualized paranasal sinuses and mastoid air cells are clear. The skull is intact.                                EKG interpretation: Normal sinus rhythm with PVC noted, no acute ST-T changes, rate: 85 bpm       Additional MDM:     NIH Stroke Scale:   Interval = baseline (upon arrival/admit)  Level of consciousness = 0 - alert  LOC questions = 0 - answers both correctly  Best gaze = 0 - normal  Visual = 0 - no visual loss  Facial palsy = 0 - normal  Motor left arm =  0 - no drift  Motor right arm =  0 - no drift  Motor left leg = 0 - no drift  Motor right leg =  0 - no drift  Limb ataxia = 0 - absent  Sensory = 0 - normal  Best language = 0 - no aphasia  Dysarthria = 0 - normal articulation  Extinction and inattention = 0 - no neglect                              Clinical Impression:       ICD-10-CM ICD-9-CM   1. Stroke-like symptom  R29.90 781.99   2. Left arm numbness  R20.0 782.0   3. Transient neurologic deficit  R29.818 781.99         Disposition:   Disposition: Discharged  Condition: Stable     ED Disposition Condition    Discharge Stable        ED Prescriptions     None        Follow-up Information     Follow up With Specialties Details Why Contact Info    Mohsen Donahue MD Neurology Schedule an appointment as soon as possible for a visit in 2 days for follow up of TIA / "mini-stroke" [id number] [address] " [facility] [address]  [phone]                                       Colton Florez MD  08/20/20 0118

## 2021-02-19 ENCOUNTER — HOSPITAL ENCOUNTER (INPATIENT)
Facility: HOSPITAL | Age: 71
LOS: 2 days | Discharge: HOME | DRG: 291 | End: 2021-02-22
Attending: EMERGENCY MEDICINE | Admitting: INTERNAL MEDICINE
Payer: MEDICARE

## 2021-02-19 DIAGNOSIS — J96.01 ACUTE HYPOXEMIC RESPIRATORY FAILURE: Primary | ICD-10-CM

## 2021-02-19 DIAGNOSIS — I50.9 CHF EXACERBATION: ICD-10-CM

## 2021-02-19 DIAGNOSIS — J96.90 RESPIRATORY FAILURE: ICD-10-CM

## 2021-02-19 DIAGNOSIS — R07.9 CHEST PAIN: ICD-10-CM

## 2021-02-19 DIAGNOSIS — R06.09 DOE (DYSPNEA ON EXERTION): ICD-10-CM

## 2021-02-19 DIAGNOSIS — E87.6 HYPOKALEMIA: ICD-10-CM

## 2021-02-19 DIAGNOSIS — R79.89 ELEVATED TROPONIN: ICD-10-CM

## 2021-02-19 DIAGNOSIS — I50.9 CHF (CONGESTIVE HEART FAILURE): ICD-10-CM

## 2021-02-19 DIAGNOSIS — I50.43 ACUTE ON CHRONIC COMBINED SYSTOLIC AND DIASTOLIC HEART FAILURE: ICD-10-CM

## 2021-02-19 PROBLEM — J44.9 COPD (CHRONIC OBSTRUCTIVE PULMONARY DISEASE): Status: ACTIVE | Noted: 2021-02-19

## 2021-02-19 PROBLEM — E11.9 DM2 (DIABETES MELLITUS, TYPE 2): Status: ACTIVE | Noted: 2021-02-19

## 2021-02-19 PROBLEM — I10 HTN (HYPERTENSION): Status: ACTIVE | Noted: 2021-02-19

## 2021-02-19 PROBLEM — E78.5 HLD (HYPERLIPIDEMIA): Status: ACTIVE | Noted: 2021-02-19

## 2021-02-19 PROBLEM — I25.10 CAD (CORONARY ARTERY DISEASE): Status: ACTIVE | Noted: 2021-02-19

## 2021-02-19 PROBLEM — N18.30 CKD (CHRONIC KIDNEY DISEASE) STAGE 3, GFR 30-59 ML/MIN: Status: ACTIVE | Noted: 2021-02-19

## 2021-02-19 LAB
ALBUMIN SERPL BCP-MCNC: 3 G/DL (ref 3.5–5.2)
ALLENS TEST: ABNORMAL
ALP SERPL-CCNC: 102 U/L (ref 55–135)
ALT SERPL W/O P-5'-P-CCNC: 8 U/L (ref 10–44)
ANION GAP SERPL CALC-SCNC: 11 MMOL/L (ref 8–16)
APTT BLDCRRT: 26 SEC (ref 21–32)
AST SERPL-CCNC: 12 U/L (ref 10–40)
BASOPHILS # BLD AUTO: 0.03 K/UL (ref 0–0.2)
BASOPHILS NFR BLD: 0.3 % (ref 0–1.9)
BILIRUB SERPL-MCNC: 0.5 MG/DL (ref 0.1–1)
BILIRUB UR QL STRIP: NEGATIVE
BNP SERPL-MCNC: 82 PG/ML (ref 0–99)
BUN SERPL-MCNC: 13 MG/DL (ref 8–23)
CALCIUM SERPL-MCNC: 8.5 MG/DL (ref 8.7–10.5)
CHLORIDE SERPL-SCNC: 95 MMOL/L (ref 95–110)
CLARITY UR REFRACT.AUTO: CLEAR
CO2 SERPL-SCNC: 36 MMOL/L (ref 23–29)
COLOR UR AUTO: YELLOW
CREAT SERPL-MCNC: 1.5 MG/DL (ref 0.5–1.4)
DELSYS: ABNORMAL
DIFFERENTIAL METHOD: ABNORMAL
EOSINOPHIL # BLD AUTO: 0.2 K/UL (ref 0–0.5)
EOSINOPHIL NFR BLD: 2.3 % (ref 0–8)
ERYTHROCYTE [DISTWIDTH] IN BLOOD BY AUTOMATED COUNT: 19 % (ref 11.5–14.5)
EST. GFR  (AFRICAN AMERICAN): 54.1 ML/MIN/1.73 M^2
EST. GFR  (NON AFRICAN AMERICAN): 46.8 ML/MIN/1.73 M^2
GLUCOSE SERPL-MCNC: 130 MG/DL (ref 70–110)
GLUCOSE UR QL STRIP: NEGATIVE
HCO3 UR-SCNC: 34.9 MMOL/L (ref 24–28)
HCT VFR BLD AUTO: 36.2 % (ref 40–54)
HGB BLD-MCNC: 10.9 G/DL (ref 14–18)
HGB UR QL STRIP: NEGATIVE
IMM GRANULOCYTES # BLD AUTO: 0.03 K/UL (ref 0–0.04)
IMM GRANULOCYTES NFR BLD AUTO: 0.3 % (ref 0–0.5)
INR PPP: 1 (ref 0.8–1.2)
KETONES UR QL STRIP: NEGATIVE
LEUKOCYTE ESTERASE UR QL STRIP: NEGATIVE
LYMPHOCYTES # BLD AUTO: 0.9 K/UL (ref 1–4.8)
LYMPHOCYTES NFR BLD: 10.5 % (ref 18–48)
MAGNESIUM SERPL-MCNC: 1.9 MG/DL (ref 1.6–2.6)
MCH RBC QN AUTO: 24.1 PG (ref 27–31)
MCHC RBC AUTO-ENTMCNC: 30.1 G/DL (ref 32–36)
MCV RBC AUTO: 80 FL (ref 82–98)
MODE: ABNORMAL
MONOCYTES # BLD AUTO: 1 K/UL (ref 0.3–1)
MONOCYTES NFR BLD: 10.7 % (ref 4–15)
NEUTROPHILS # BLD AUTO: 6.7 K/UL (ref 1.8–7.7)
NEUTROPHILS NFR BLD: 75.9 % (ref 38–73)
NITRITE UR QL STRIP: NEGATIVE
NRBC BLD-RTO: 0 /100 WBC
PCO2 BLDA: 52.9 MMHG (ref 35–45)
PH SMN: 7.43 [PH] (ref 7.35–7.45)
PH UR STRIP: 7 [PH] (ref 5–8)
PHOSPHATE SERPL-MCNC: 2.2 MG/DL (ref 2.7–4.5)
PLATELET # BLD AUTO: 253 K/UL (ref 150–350)
PMV BLD AUTO: 10.1 FL (ref 9.2–12.9)
PO2 BLDA: 61 MMHG (ref 80–100)
POC BE: 11 MMOL/L
POC SATURATED O2: 91 % (ref 95–100)
POCT GLUCOSE: 174 MG/DL (ref 70–110)
POTASSIUM SERPL-SCNC: 3.1 MMOL/L (ref 3.5–5.1)
PROT SERPL-MCNC: 7 G/DL (ref 6–8.4)
PROT UR QL STRIP: NEGATIVE
PROTHROMBIN TIME: 10.5 SEC (ref 9–12.5)
RBC # BLD AUTO: 4.52 M/UL (ref 4.6–6.2)
SAMPLE: ABNORMAL
SARS-COV-2 RDRP RESP QL NAA+PROBE: NEGATIVE
SITE: ABNORMAL
SODIUM SERPL-SCNC: 142 MMOL/L (ref 136–145)
SP GR UR STRIP: 1.01 (ref 1–1.03)
TROPONIN I SERPL DL<=0.01 NG/ML-MCNC: 0.03 NG/ML (ref 0–0.03)
URN SPEC COLLECT METH UR: NORMAL
UROBILINOGEN UR STRIP-ACNC: <2 EU/DL
WBC # BLD AUTO: 8.88 K/UL (ref 3.9–12.7)

## 2021-02-19 PROCEDURE — 27000221 HC OXYGEN, UP TO 24 HOURS: Mod: ER

## 2021-02-19 PROCEDURE — 84100 ASSAY OF PHOSPHORUS: CPT | Mod: ER

## 2021-02-19 PROCEDURE — 93010 EKG 12-LEAD: ICD-10-PCS | Mod: ,,, | Performed by: INTERNAL MEDICINE

## 2021-02-19 PROCEDURE — U0002 COVID-19 LAB TEST NON-CDC: HCPCS | Mod: ER

## 2021-02-19 PROCEDURE — 96375 TX/PRO/DX INJ NEW DRUG ADDON: CPT

## 2021-02-19 PROCEDURE — 94640 AIRWAY INHALATION TREATMENT: CPT | Mod: ER

## 2021-02-19 PROCEDURE — 93005 ELECTROCARDIOGRAM TRACING: CPT | Mod: ER

## 2021-02-19 PROCEDURE — 99900035 HC TECH TIME PER 15 MIN (STAT): Mod: ER

## 2021-02-19 PROCEDURE — 63600175 PHARM REV CODE 636 W HCPCS: Performed by: NURSE PRACTITIONER

## 2021-02-19 PROCEDURE — 25000003 PHARM REV CODE 250: Mod: ER | Performed by: EMERGENCY MEDICINE

## 2021-02-19 PROCEDURE — 25000242 PHARM REV CODE 250 ALT 637 W/ HCPCS: Mod: ER | Performed by: EMERGENCY MEDICINE

## 2021-02-19 PROCEDURE — 80053 COMPREHEN METABOLIC PANEL: CPT | Mod: ER

## 2021-02-19 PROCEDURE — 84484 ASSAY OF TROPONIN QUANT: CPT | Mod: ER

## 2021-02-19 PROCEDURE — G0378 HOSPITAL OBSERVATION PER HR: HCPCS | Mod: ER

## 2021-02-19 PROCEDURE — 96375 TX/PRO/DX INJ NEW DRUG ADDON: CPT | Mod: ER

## 2021-02-19 PROCEDURE — G0378 HOSPITAL OBSERVATION PER HR: HCPCS

## 2021-02-19 PROCEDURE — 36600 WITHDRAWAL OF ARTERIAL BLOOD: CPT | Mod: ER

## 2021-02-19 PROCEDURE — 96365 THER/PROPH/DIAG IV INF INIT: CPT | Mod: ER

## 2021-02-19 PROCEDURE — 85610 PROTHROMBIN TIME: CPT | Mod: ER

## 2021-02-19 PROCEDURE — 83880 ASSAY OF NATRIURETIC PEPTIDE: CPT | Mod: ER

## 2021-02-19 PROCEDURE — 85730 THROMBOPLASTIN TIME PARTIAL: CPT | Mod: ER

## 2021-02-19 PROCEDURE — 81003 URINALYSIS AUTO W/O SCOPE: CPT | Mod: ER

## 2021-02-19 PROCEDURE — 83735 ASSAY OF MAGNESIUM: CPT | Mod: ER

## 2021-02-19 PROCEDURE — 85025 COMPLETE CBC W/AUTO DIFF WBC: CPT | Mod: ER

## 2021-02-19 PROCEDURE — 25000003 PHARM REV CODE 250: Performed by: NURSE PRACTITIONER

## 2021-02-19 PROCEDURE — 99291 CRITICAL CARE FIRST HOUR: CPT | Mod: 25,ER

## 2021-02-19 PROCEDURE — S0171 BUMETANIDE 0.5 MG: HCPCS | Mod: ER | Performed by: EMERGENCY MEDICINE

## 2021-02-19 PROCEDURE — 63600175 PHARM REV CODE 636 W HCPCS: Mod: ER | Performed by: EMERGENCY MEDICINE

## 2021-02-19 PROCEDURE — 82803 BLOOD GASES ANY COMBINATION: CPT | Mod: ER

## 2021-02-19 PROCEDURE — 93010 ELECTROCARDIOGRAM REPORT: CPT | Mod: ,,, | Performed by: INTERNAL MEDICINE

## 2021-02-19 RX ORDER — IBUPROFEN 200 MG
24 TABLET ORAL
Status: DISCONTINUED | OUTPATIENT
Start: 2021-02-19 | End: 2021-02-22 | Stop reason: HOSPADM

## 2021-02-19 RX ORDER — BUMETANIDE 0.25 MG/ML
1 INJECTION INTRAMUSCULAR; INTRAVENOUS
Status: COMPLETED | OUTPATIENT
Start: 2021-02-19 | End: 2021-02-19

## 2021-02-19 RX ORDER — ALBUTEROL SULFATE 90 UG/1
2 AEROSOL, METERED RESPIRATORY (INHALATION)
Status: DISCONTINUED | OUTPATIENT
Start: 2021-02-19 | End: 2021-02-21

## 2021-02-19 RX ORDER — ESCITALOPRAM OXALATE 5 MG/1
5 TABLET ORAL NIGHTLY
Status: DISCONTINUED | OUTPATIENT
Start: 2021-02-19 | End: 2021-02-22 | Stop reason: HOSPADM

## 2021-02-19 RX ORDER — ATORVASTATIN CALCIUM 40 MG/1
80 TABLET, FILM COATED ORAL DAILY
Status: DISCONTINUED | OUTPATIENT
Start: 2021-02-20 | End: 2021-02-22 | Stop reason: HOSPADM

## 2021-02-19 RX ORDER — HYDRALAZINE HYDROCHLORIDE 20 MG/ML
10 INJECTION INTRAMUSCULAR; INTRAVENOUS EVERY 6 HOURS PRN
Status: DISCONTINUED | OUTPATIENT
Start: 2021-02-19 | End: 2021-02-22 | Stop reason: HOSPADM

## 2021-02-19 RX ORDER — TALC
6 POWDER (GRAM) TOPICAL NIGHTLY PRN
Status: DISCONTINUED | OUTPATIENT
Start: 2021-02-19 | End: 2021-02-22 | Stop reason: HOSPADM

## 2021-02-19 RX ORDER — INSULIN ASPART 100 [IU]/ML
1-10 INJECTION, SOLUTION INTRAVENOUS; SUBCUTANEOUS
Status: DISCONTINUED | OUTPATIENT
Start: 2021-02-19 | End: 2021-02-22 | Stop reason: HOSPADM

## 2021-02-19 RX ORDER — PNV NO.95/FERROUS FUM/FOLIC AC 28MG-0.8MG
100 TABLET ORAL DAILY
Status: DISCONTINUED | OUTPATIENT
Start: 2021-02-20 | End: 2021-02-22 | Stop reason: HOSPADM

## 2021-02-19 RX ORDER — BUDESONIDE 0.5 MG/2ML
0.5 INHALANT ORAL EVERY 12 HOURS
Status: DISCONTINUED | OUTPATIENT
Start: 2021-02-20 | End: 2021-02-22 | Stop reason: HOSPADM

## 2021-02-19 RX ORDER — CLOPIDOGREL BISULFATE 75 MG/1
75 TABLET ORAL DAILY
Status: DISCONTINUED | OUTPATIENT
Start: 2021-02-20 | End: 2021-02-22 | Stop reason: HOSPADM

## 2021-02-19 RX ORDER — GLUCAGON 1 MG
1 KIT INJECTION
Status: DISCONTINUED | OUTPATIENT
Start: 2021-02-19 | End: 2021-02-22 | Stop reason: HOSPADM

## 2021-02-19 RX ORDER — IPRATROPIUM BROMIDE AND ALBUTEROL SULFATE 2.5; .5 MG/3ML; MG/3ML
3 SOLUTION RESPIRATORY (INHALATION)
Status: COMPLETED | OUTPATIENT
Start: 2021-02-19 | End: 2021-02-19

## 2021-02-19 RX ORDER — FUROSEMIDE 10 MG/ML
40 INJECTION INTRAMUSCULAR; INTRAVENOUS
Status: DISCONTINUED | OUTPATIENT
Start: 2021-02-19 | End: 2021-02-20

## 2021-02-19 RX ORDER — FERROUS SULFATE 325(65) MG
325 TABLET, DELAYED RELEASE (ENTERIC COATED) ORAL
Status: DISCONTINUED | OUTPATIENT
Start: 2021-02-19 | End: 2021-02-22 | Stop reason: HOSPADM

## 2021-02-19 RX ORDER — ONDANSETRON 2 MG/ML
4 INJECTION INTRAMUSCULAR; INTRAVENOUS EVERY 6 HOURS PRN
Status: DISCONTINUED | OUTPATIENT
Start: 2021-02-19 | End: 2021-02-22 | Stop reason: HOSPADM

## 2021-02-19 RX ORDER — SENNOSIDES 8.6 MG/1
1 TABLET ORAL NIGHTLY
Status: DISCONTINUED | OUTPATIENT
Start: 2021-02-19 | End: 2021-02-22 | Stop reason: HOSPADM

## 2021-02-19 RX ORDER — POTASSIUM CHLORIDE 7.45 MG/ML
10 INJECTION INTRAVENOUS
Status: COMPLETED | OUTPATIENT
Start: 2021-02-19 | End: 2021-02-19

## 2021-02-19 RX ORDER — SODIUM CHLORIDE 0.9 % (FLUSH) 0.9 %
10 SYRINGE (ML) INJECTION
Status: DISCONTINUED | OUTPATIENT
Start: 2021-02-19 | End: 2021-02-22 | Stop reason: HOSPADM

## 2021-02-19 RX ORDER — POTASSIUM CHLORIDE 20 MEQ/1
40 TABLET, EXTENDED RELEASE ORAL EVERY 4 HOURS
Status: COMPLETED | OUTPATIENT
Start: 2021-02-19 | End: 2021-02-20

## 2021-02-19 RX ORDER — ACETAMINOPHEN 325 MG/1
650 TABLET ORAL EVERY 6 HOURS PRN
Status: DISCONTINUED | OUTPATIENT
Start: 2021-02-19 | End: 2021-02-22 | Stop reason: HOSPADM

## 2021-02-19 RX ORDER — IBUPROFEN 200 MG
16 TABLET ORAL
Status: DISCONTINUED | OUTPATIENT
Start: 2021-02-19 | End: 2021-02-22 | Stop reason: HOSPADM

## 2021-02-19 RX ORDER — IPRATROPIUM BROMIDE AND ALBUTEROL SULFATE 2.5; .5 MG/3ML; MG/3ML
3 SOLUTION RESPIRATORY (INHALATION) EVERY 6 HOURS
Status: DISCONTINUED | OUTPATIENT
Start: 2021-02-20 | End: 2021-02-22 | Stop reason: HOSPADM

## 2021-02-19 RX ORDER — LOSARTAN POTASSIUM 25 MG/1
50 TABLET ORAL 2 TIMES DAILY
Status: DISCONTINUED | OUTPATIENT
Start: 2021-02-19 | End: 2021-02-20

## 2021-02-19 RX ADMIN — ESCITALOPRAM 5 MG: 5 TABLET, FILM COATED ORAL at 08:02

## 2021-02-19 RX ADMIN — FERROUS SULFATE TAB EC 325 MG (65 MG FE EQUIVALENT) 325 MG: 325 (65 FE) TABLET DELAYED RESPONSE at 08:02

## 2021-02-19 RX ADMIN — IPRATROPIUM BROMIDE AND ALBUTEROL SULFATE 3 ML: .5; 2.5 SOLUTION RESPIRATORY (INHALATION) at 06:02

## 2021-02-19 RX ADMIN — SENNOSIDES 1 TABLET: 8.6 TABLET, FILM COATED ORAL at 08:02

## 2021-02-19 RX ADMIN — BUMETANIDE 1 MG: 0.25 INJECTION INTRAMUSCULAR; INTRAVENOUS at 06:02

## 2021-02-19 RX ADMIN — LOSARTAN POTASSIUM 50 MG: 25 TABLET, FILM COATED ORAL at 08:02

## 2021-02-19 RX ADMIN — POTASSIUM CHLORIDE 10 MEQ: 7.46 INJECTION, SOLUTION INTRAVENOUS at 05:02

## 2021-02-19 RX ADMIN — FUROSEMIDE 40 MG: 10 INJECTION, SOLUTION INTRAMUSCULAR; INTRAVENOUS at 10:02

## 2021-02-19 RX ADMIN — POTASSIUM CHLORIDE 40 MEQ: 1500 TABLET, EXTENDED RELEASE ORAL at 10:02

## 2021-02-20 PROBLEM — I50.43 ACUTE ON CHRONIC COMBINED SYSTOLIC AND DIASTOLIC HEART FAILURE: Status: ACTIVE | Noted: 2021-02-19

## 2021-02-20 PROBLEM — J96.01 ACUTE HYPOXEMIC RESPIRATORY FAILURE: Status: ACTIVE | Noted: 2021-02-20

## 2021-02-20 LAB
ANION GAP SERPL CALC-SCNC: 8 MMOL/L (ref 8–16)
ASCENDING AORTA: 3.38 CM
AV INDEX (PROSTH): 0.3
AV MEAN GRADIENT: 26 MMHG
AV PEAK GRADIENT: 32 MMHG
AV VALVE AREA: 1.49 CM2
AV VELOCITY RATIO: 0.38
BASOPHILS # BLD AUTO: 0.03 K/UL (ref 0–0.2)
BASOPHILS NFR BLD: 0.4 % (ref 0–1.9)
BSA FOR ECHO PROCEDURE: 3.07 M2
BUN SERPL-MCNC: 12 MG/DL (ref 8–23)
CALCIUM SERPL-MCNC: 8.3 MG/DL (ref 8.7–10.5)
CHLORIDE SERPL-SCNC: 97 MMOL/L (ref 95–110)
CHOLEST SERPL-MCNC: 142 MG/DL (ref 120–199)
CHOLEST/HDLC SERPL: 3.9 {RATIO} (ref 2–5)
CO2 SERPL-SCNC: 37 MMOL/L (ref 23–29)
CREAT SERPL-MCNC: 1.5 MG/DL (ref 0.5–1.4)
DIFFERENTIAL METHOD: ABNORMAL
DOP CALC AO PEAK VEL: 2.84 M/S
DOP CALC AO VTI: 56.74 CM
DOP CALC LVOT AREA: 5 CM2
DOP CALC LVOT DIAMETER: 2.53 CM
DOP CALC LVOT PEAK VEL: 1.07 M/S
DOP CALC LVOT STROKE VOLUME: 84.57 CM3
DOP CALC RVOT PEAK VEL: 0.65 M/S
DOP CALC RVOT VTI: 10.83 CM
DOP CALCLVOT PEAK VEL VTI: 16.83 CM
E WAVE DECELERATION TIME: 158.24 MSEC
E/A RATIO: 1.15
E/E' RATIO: 15.14 M/S
EOSINOPHIL # BLD AUTO: 0.2 K/UL (ref 0–0.5)
EOSINOPHIL NFR BLD: 2.7 % (ref 0–8)
ERYTHROCYTE [DISTWIDTH] IN BLOOD BY AUTOMATED COUNT: 19.2 % (ref 11.5–14.5)
EST. GFR  (AFRICAN AMERICAN): 54 ML/MIN/1.73 M^2
EST. GFR  (NON AFRICAN AMERICAN): 47 ML/MIN/1.73 M^2
ESTIMATED AVG GLUCOSE: 140 MG/DL (ref 68–131)
GLUCOSE SERPL-MCNC: 110 MG/DL (ref 70–110)
HBA1C MFR BLD: 6.5 % (ref 4–5.6)
HCT VFR BLD AUTO: 36 % (ref 40–54)
HDLC SERPL-MCNC: 36 MG/DL (ref 40–75)
HDLC SERPL: 25.4 % (ref 20–50)
HGB BLD-MCNC: 10.6 G/DL (ref 14–18)
IMM GRANULOCYTES # BLD AUTO: 0.03 K/UL (ref 0–0.04)
IMM GRANULOCYTES NFR BLD AUTO: 0.4 % (ref 0–0.5)
IVRT: 145.58 MSEC
LA MAJOR: 6.05 CM
LA MINOR: 6.53 CM
LA WIDTH: 5.13 CM
LDLC SERPL CALC-MCNC: 93.6 MG/DL (ref 63–159)
LV LATERAL E/E' RATIO: 13.25 M/S
LV SEPTAL E/E' RATIO: 17.67 M/S
LYMPHOCYTES # BLD AUTO: 0.7 K/UL (ref 1–4.8)
LYMPHOCYTES NFR BLD: 8.8 % (ref 18–48)
MAGNESIUM SERPL-MCNC: 2 MG/DL (ref 1.6–2.6)
MCH RBC QN AUTO: 24.3 PG (ref 27–31)
MCHC RBC AUTO-ENTMCNC: 29.4 G/DL (ref 32–36)
MCV RBC AUTO: 83 FL (ref 82–98)
MONOCYTES # BLD AUTO: 1 K/UL (ref 0.3–1)
MONOCYTES NFR BLD: 13.3 % (ref 4–15)
MV MEAN GRADIENT: 1 MMHG
MV PEAK A VEL: 0.92 M/S
MV PEAK E VEL: 1.06 M/S
MV PEAK GRADIENT: 5 MMHG
MV STENOSIS PRESSURE HALF TIME: 45.89 MS
MV VALVE AREA P 1/2 METHOD: 4.79 CM2
NEUTROPHILS # BLD AUTO: 5.7 K/UL (ref 1.8–7.7)
NEUTROPHILS NFR BLD: 74.4 % (ref 38–73)
NONHDLC SERPL-MCNC: 106 MG/DL
NRBC BLD-RTO: 0 /100 WBC
PISA TR MAX VEL: 2.63 M/S
PLATELET # BLD AUTO: 269 K/UL (ref 150–350)
PMV BLD AUTO: 11 FL (ref 9.2–12.9)
POCT GLUCOSE: 101 MG/DL (ref 70–110)
POCT GLUCOSE: 119 MG/DL (ref 70–110)
POCT GLUCOSE: 125 MG/DL (ref 70–110)
POCT GLUCOSE: 96 MG/DL (ref 70–110)
POTASSIUM SERPL-SCNC: 3.3 MMOL/L (ref 3.5–5.1)
PULM VEIN S/D RATIO: 1.64
PV MEAN GRADIENT: 1 MMHG
PV PEAK D VEL: 0.36 M/S
PV PEAK S VEL: 0.59 M/S
PV PEAK VELOCITY: 1.45 CM/S
RA MAJOR: 5.76 CM
RA PRESSURE: 15 MMHG
RBC # BLD AUTO: 4.36 M/UL (ref 4.6–6.2)
RV TISSUE DOPPLER FREE WALL SYSTOLIC VELOCITY 1 (APICAL 4 CHAMBER VIEW): 15.18 CM/S
SINUS: 3.36 CM
SODIUM SERPL-SCNC: 142 MMOL/L (ref 136–145)
STJ: 3.14 CM
TDI LATERAL: 0.08 M/S
TDI SEPTAL: 0.06 M/S
TDI: 0.07 M/S
TR MAX PG: 28 MMHG
TRICUSPID ANNULAR PLANE SYSTOLIC EXCURSION: 2.7 CM
TRIGL SERPL-MCNC: 62 MG/DL (ref 30–150)
TV REST PULMONARY ARTERY PRESSURE: 43 MMHG
WBC # BLD AUTO: 7.65 K/UL (ref 3.9–12.7)

## 2021-02-20 PROCEDURE — 36415 COLL VENOUS BLD VENIPUNCTURE: CPT

## 2021-02-20 PROCEDURE — 99223 1ST HOSP IP/OBS HIGH 75: CPT | Mod: 25,,, | Performed by: INTERNAL MEDICINE

## 2021-02-20 PROCEDURE — 25000003 PHARM REV CODE 250: Performed by: NURSE PRACTITIONER

## 2021-02-20 PROCEDURE — 96376 TX/PRO/DX INJ SAME DRUG ADON: CPT

## 2021-02-20 PROCEDURE — 94640 AIRWAY INHALATION TREATMENT: CPT

## 2021-02-20 PROCEDURE — 80061 LIPID PANEL: CPT

## 2021-02-20 PROCEDURE — 25500020 PHARM REV CODE 255: Performed by: INTERNAL MEDICINE

## 2021-02-20 PROCEDURE — 94761 N-INVAS EAR/PLS OXIMETRY MLT: CPT

## 2021-02-20 PROCEDURE — 21400001 HC TELEMETRY ROOM

## 2021-02-20 PROCEDURE — 85025 COMPLETE CBC W/AUTO DIFF WBC: CPT

## 2021-02-20 PROCEDURE — 27000221 HC OXYGEN, UP TO 24 HOURS

## 2021-02-20 PROCEDURE — 83036 HEMOGLOBIN GLYCOSYLATED A1C: CPT

## 2021-02-20 PROCEDURE — 63600175 PHARM REV CODE 636 W HCPCS: Performed by: INTERNAL MEDICINE

## 2021-02-20 PROCEDURE — 80048 BASIC METABOLIC PNL TOTAL CA: CPT

## 2021-02-20 PROCEDURE — 25000242 PHARM REV CODE 250 ALT 637 W/ HCPCS: Performed by: NURSE PRACTITIONER

## 2021-02-20 PROCEDURE — 99223 PR INITIAL HOSPITAL CARE,LEVL III: ICD-10-PCS | Mod: 25,,, | Performed by: INTERNAL MEDICINE

## 2021-02-20 PROCEDURE — 63600175 PHARM REV CODE 636 W HCPCS: Performed by: NURSE PRACTITIONER

## 2021-02-20 PROCEDURE — 83735 ASSAY OF MAGNESIUM: CPT

## 2021-02-20 RX ORDER — FUROSEMIDE 10 MG/ML
40 INJECTION INTRAMUSCULAR; INTRAVENOUS EVERY 8 HOURS
Status: DISCONTINUED | OUTPATIENT
Start: 2021-02-20 | End: 2021-02-22 | Stop reason: HOSPADM

## 2021-02-20 RX ORDER — POTASSIUM CHLORIDE 20 MEQ/1
40 TABLET, EXTENDED RELEASE ORAL 2 TIMES DAILY
Status: COMPLETED | OUTPATIENT
Start: 2021-02-20 | End: 2021-02-22

## 2021-02-20 RX ADMIN — CLOPIDOGREL 75 MG: 75 TABLET, FILM COATED ORAL at 08:02

## 2021-02-20 RX ADMIN — LOSARTAN POTASSIUM 50 MG: 25 TABLET, FILM COATED ORAL at 08:02

## 2021-02-20 RX ADMIN — IPRATROPIUM BROMIDE AND ALBUTEROL SULFATE 3 ML: .5; 3 SOLUTION RESPIRATORY (INHALATION) at 08:02

## 2021-02-20 RX ADMIN — VITAM B12 100 MCG: 100 TAB at 08:02

## 2021-02-20 RX ADMIN — HUMAN ALBUMIN MICROSPHERES AND PERFLUTREN 0.11 MG: 10; .22 INJECTION, SOLUTION INTRAVENOUS at 09:02

## 2021-02-20 RX ADMIN — POTASSIUM CHLORIDE 40 MEQ: 1500 TABLET, EXTENDED RELEASE ORAL at 08:02

## 2021-02-20 RX ADMIN — IPRATROPIUM BROMIDE AND ALBUTEROL SULFATE 3 ML: .5; 3 SOLUTION RESPIRATORY (INHALATION) at 12:02

## 2021-02-20 RX ADMIN — IPRATROPIUM BROMIDE AND ALBUTEROL SULFATE 3 ML: .5; 3 SOLUTION RESPIRATORY (INHALATION) at 07:02

## 2021-02-20 RX ADMIN — FUROSEMIDE 40 MG: 10 INJECTION, SOLUTION INTRAMUSCULAR; INTRAVENOUS at 04:02

## 2021-02-20 RX ADMIN — FUROSEMIDE 40 MG: 10 INJECTION, SOLUTION INTRAMUSCULAR; INTRAVENOUS at 09:02

## 2021-02-20 RX ADMIN — ATORVASTATIN CALCIUM 80 MG: 40 TABLET, FILM COATED ORAL at 08:02

## 2021-02-20 RX ADMIN — SENNOSIDES 1 TABLET: 8.6 TABLET, FILM COATED ORAL at 08:02

## 2021-02-20 RX ADMIN — POTASSIUM CHLORIDE 40 MEQ: 1500 TABLET, EXTENDED RELEASE ORAL at 04:02

## 2021-02-20 RX ADMIN — BUDESONIDE 0.5 MG: 0.5 SUSPENSION RESPIRATORY (INHALATION) at 08:02

## 2021-02-20 RX ADMIN — ESCITALOPRAM 5 MG: 5 TABLET, FILM COATED ORAL at 08:02

## 2021-02-20 RX ADMIN — BUDESONIDE 0.5 MG: 0.5 SUSPENSION RESPIRATORY (INHALATION) at 07:02

## 2021-02-20 RX ADMIN — POTASSIUM CHLORIDE 40 MEQ: 1500 TABLET, EXTENDED RELEASE ORAL at 11:02

## 2021-02-21 LAB
ANION GAP SERPL CALC-SCNC: 8 MMOL/L (ref 8–16)
BASOPHILS # BLD AUTO: 0.02 K/UL (ref 0–0.2)
BASOPHILS NFR BLD: 0.2 % (ref 0–1.9)
BUN SERPL-MCNC: 11 MG/DL (ref 8–23)
CALCIUM SERPL-MCNC: 8.7 MG/DL (ref 8.7–10.5)
CHLORIDE SERPL-SCNC: 97 MMOL/L (ref 95–110)
CO2 SERPL-SCNC: 39 MMOL/L (ref 23–29)
CREAT SERPL-MCNC: 1.4 MG/DL (ref 0.5–1.4)
DIFFERENTIAL METHOD: ABNORMAL
EOSINOPHIL # BLD AUTO: 0.2 K/UL (ref 0–0.5)
EOSINOPHIL NFR BLD: 2.9 % (ref 0–8)
ERYTHROCYTE [DISTWIDTH] IN BLOOD BY AUTOMATED COUNT: 19.3 % (ref 11.5–14.5)
EST. GFR  (AFRICAN AMERICAN): 59 ML/MIN/1.73 M^2
EST. GFR  (NON AFRICAN AMERICAN): 51 ML/MIN/1.73 M^2
GLUCOSE SERPL-MCNC: 92 MG/DL (ref 70–110)
HCT VFR BLD AUTO: 37.4 % (ref 40–54)
HGB BLD-MCNC: 10.6 G/DL (ref 14–18)
IMM GRANULOCYTES # BLD AUTO: 0.03 K/UL (ref 0–0.04)
IMM GRANULOCYTES NFR BLD AUTO: 0.4 % (ref 0–0.5)
LYMPHOCYTES # BLD AUTO: 0.9 K/UL (ref 1–4.8)
LYMPHOCYTES NFR BLD: 11.1 % (ref 18–48)
MAGNESIUM SERPL-MCNC: 2.1 MG/DL (ref 1.6–2.6)
MCH RBC QN AUTO: 23.8 PG (ref 27–31)
MCHC RBC AUTO-ENTMCNC: 28.3 G/DL (ref 32–36)
MCV RBC AUTO: 84 FL (ref 82–98)
MONOCYTES # BLD AUTO: 1 K/UL (ref 0.3–1)
MONOCYTES NFR BLD: 11.6 % (ref 4–15)
NEUTROPHILS # BLD AUTO: 6.1 K/UL (ref 1.8–7.7)
NEUTROPHILS NFR BLD: 73.8 % (ref 38–73)
NRBC BLD-RTO: 0 /100 WBC
PLATELET # BLD AUTO: 243 K/UL (ref 150–350)
PMV BLD AUTO: 10.7 FL (ref 9.2–12.9)
POCT GLUCOSE: 105 MG/DL (ref 70–110)
POCT GLUCOSE: 145 MG/DL (ref 70–110)
POCT GLUCOSE: 175 MG/DL (ref 70–110)
POCT GLUCOSE: 268 MG/DL (ref 70–110)
POTASSIUM SERPL-SCNC: 3.8 MMOL/L (ref 3.5–5.1)
RBC # BLD AUTO: 4.46 M/UL (ref 4.6–6.2)
SODIUM SERPL-SCNC: 144 MMOL/L (ref 136–145)
WBC # BLD AUTO: 8.22 K/UL (ref 3.9–12.7)

## 2021-02-21 PROCEDURE — 80048 BASIC METABOLIC PNL TOTAL CA: CPT

## 2021-02-21 PROCEDURE — 21400001 HC TELEMETRY ROOM

## 2021-02-21 PROCEDURE — 85025 COMPLETE CBC W/AUTO DIFF WBC: CPT

## 2021-02-21 PROCEDURE — 25000003 PHARM REV CODE 250: Performed by: NURSE PRACTITIONER

## 2021-02-21 PROCEDURE — 96372 THER/PROPH/DIAG INJ SC/IM: CPT

## 2021-02-21 PROCEDURE — 99233 PR SUBSEQUENT HOSPITAL CARE,LEVL III: ICD-10-PCS | Mod: ,,, | Performed by: INTERNAL MEDICINE

## 2021-02-21 PROCEDURE — 94640 AIRWAY INHALATION TREATMENT: CPT

## 2021-02-21 PROCEDURE — 25000242 PHARM REV CODE 250 ALT 637 W/ HCPCS: Performed by: NURSE PRACTITIONER

## 2021-02-21 PROCEDURE — 83735 ASSAY OF MAGNESIUM: CPT

## 2021-02-21 PROCEDURE — 99233 SBSQ HOSP IP/OBS HIGH 50: CPT | Mod: ,,, | Performed by: INTERNAL MEDICINE

## 2021-02-21 PROCEDURE — 63600175 PHARM REV CODE 636 W HCPCS: Performed by: NURSE PRACTITIONER

## 2021-02-21 PROCEDURE — 63600175 PHARM REV CODE 636 W HCPCS: Performed by: INTERNAL MEDICINE

## 2021-02-21 PROCEDURE — 94761 N-INVAS EAR/PLS OXIMETRY MLT: CPT

## 2021-02-21 PROCEDURE — 99900035 HC TECH TIME PER 15 MIN (STAT)

## 2021-02-21 PROCEDURE — 27000221 HC OXYGEN, UP TO 24 HOURS

## 2021-02-21 RX ORDER — LOSARTAN POTASSIUM 25 MG/1
25 TABLET ORAL DAILY
Status: DISCONTINUED | OUTPATIENT
Start: 2021-02-21 | End: 2021-02-22 | Stop reason: HOSPADM

## 2021-02-21 RX ORDER — METOLAZONE 5 MG/1
5 TABLET ORAL ONCE
Status: COMPLETED | OUTPATIENT
Start: 2021-02-21 | End: 2021-02-21

## 2021-02-21 RX ORDER — IPRATROPIUM BROMIDE AND ALBUTEROL SULFATE 2.5; .5 MG/3ML; MG/3ML
3 SOLUTION RESPIRATORY (INHALATION) EVERY 8 HOURS
Status: DISCONTINUED | OUTPATIENT
Start: 2021-02-21 | End: 2021-02-21

## 2021-02-21 RX ADMIN — FERROUS SULFATE TAB EC 325 MG (65 MG FE EQUIVALENT) 325 MG: 325 (65 FE) TABLET DELAYED RESPONSE at 08:02

## 2021-02-21 RX ADMIN — POTASSIUM CHLORIDE 40 MEQ: 1500 TABLET, EXTENDED RELEASE ORAL at 09:02

## 2021-02-21 RX ADMIN — BUDESONIDE 0.5 MG: 0.5 SUSPENSION RESPIRATORY (INHALATION) at 07:02

## 2021-02-21 RX ADMIN — ESCITALOPRAM 5 MG: 5 TABLET, FILM COATED ORAL at 08:02

## 2021-02-21 RX ADMIN — FUROSEMIDE 40 MG: 10 INJECTION, SOLUTION INTRAMUSCULAR; INTRAVENOUS at 01:02

## 2021-02-21 RX ADMIN — IPRATROPIUM BROMIDE AND ALBUTEROL SULFATE 3 ML: .5; 3 SOLUTION RESPIRATORY (INHALATION) at 01:02

## 2021-02-21 RX ADMIN — FUROSEMIDE 40 MG: 10 INJECTION, SOLUTION INTRAMUSCULAR; INTRAVENOUS at 09:02

## 2021-02-21 RX ADMIN — METOLAZONE 5 MG: 5 TABLET ORAL at 01:02

## 2021-02-21 RX ADMIN — FUROSEMIDE 40 MG: 10 INJECTION, SOLUTION INTRAMUSCULAR; INTRAVENOUS at 06:02

## 2021-02-21 RX ADMIN — SENNOSIDES 1 TABLET: 8.6 TABLET, FILM COATED ORAL at 08:02

## 2021-02-21 RX ADMIN — VITAM B12 100 MCG: 100 TAB at 09:02

## 2021-02-21 RX ADMIN — IPRATROPIUM BROMIDE AND ALBUTEROL SULFATE 3 ML: .5; 3 SOLUTION RESPIRATORY (INHALATION) at 07:02

## 2021-02-21 RX ADMIN — ATORVASTATIN CALCIUM 80 MG: 40 TABLET, FILM COATED ORAL at 09:02

## 2021-02-21 RX ADMIN — LOSARTAN POTASSIUM 25 MG: 25 TABLET, FILM COATED ORAL at 01:02

## 2021-02-21 RX ADMIN — INSULIN ASPART 6 UNITS: 100 INJECTION, SOLUTION INTRAVENOUS; SUBCUTANEOUS at 06:02

## 2021-02-21 RX ADMIN — INSULIN ASPART 2 UNITS: 100 INJECTION, SOLUTION INTRAVENOUS; SUBCUTANEOUS at 12:02

## 2021-02-21 RX ADMIN — CLOPIDOGREL 75 MG: 75 TABLET, FILM COATED ORAL at 09:02

## 2021-02-21 RX ADMIN — POTASSIUM CHLORIDE 40 MEQ: 1500 TABLET, EXTENDED RELEASE ORAL at 08:02

## 2021-02-22 VITALS
SYSTOLIC BLOOD PRESSURE: 131 MMHG | RESPIRATION RATE: 22 BRPM | DIASTOLIC BLOOD PRESSURE: 77 MMHG | OXYGEN SATURATION: 92 % | BODY MASS INDEX: 45.13 KG/M2 | HEART RATE: 94 BPM | TEMPERATURE: 98 F | WEIGHT: 297.81 LBS | HEIGHT: 68 IN

## 2021-02-22 LAB
ANION GAP SERPL CALC-SCNC: 9 MMOL/L (ref 8–16)
BASOPHILS # BLD AUTO: 0.03 K/UL (ref 0–0.2)
BASOPHILS NFR BLD: 0.3 % (ref 0–1.9)
BUN SERPL-MCNC: 12 MG/DL (ref 8–23)
CALCIUM SERPL-MCNC: 9.7 MG/DL (ref 8.7–10.5)
CHLORIDE SERPL-SCNC: 91 MMOL/L (ref 95–110)
CO2 SERPL-SCNC: 40 MMOL/L (ref 23–29)
CREAT SERPL-MCNC: 1.5 MG/DL (ref 0.5–1.4)
DIFFERENTIAL METHOD: ABNORMAL
EOSINOPHIL # BLD AUTO: 0.2 K/UL (ref 0–0.5)
EOSINOPHIL NFR BLD: 2.4 % (ref 0–8)
ERYTHROCYTE [DISTWIDTH] IN BLOOD BY AUTOMATED COUNT: 19 % (ref 11.5–14.5)
EST. GFR  (AFRICAN AMERICAN): 54 ML/MIN/1.73 M^2
EST. GFR  (NON AFRICAN AMERICAN): 47 ML/MIN/1.73 M^2
GLUCOSE SERPL-MCNC: 115 MG/DL (ref 70–110)
HCT VFR BLD AUTO: 38.3 % (ref 40–54)
HGB BLD-MCNC: 11.1 G/DL (ref 14–18)
IMM GRANULOCYTES # BLD AUTO: 0.04 K/UL (ref 0–0.04)
IMM GRANULOCYTES NFR BLD AUTO: 0.4 % (ref 0–0.5)
LYMPHOCYTES # BLD AUTO: 0.8 K/UL (ref 1–4.8)
LYMPHOCYTES NFR BLD: 7.6 % (ref 18–48)
MAGNESIUM SERPL-MCNC: 2.1 MG/DL (ref 1.6–2.6)
MCH RBC QN AUTO: 23.8 PG (ref 27–31)
MCHC RBC AUTO-ENTMCNC: 29 G/DL (ref 32–36)
MCV RBC AUTO: 82 FL (ref 82–98)
MONOCYTES # BLD AUTO: 0.9 K/UL (ref 0.3–1)
MONOCYTES NFR BLD: 9 % (ref 4–15)
NEUTROPHILS # BLD AUTO: 7.9 K/UL (ref 1.8–7.7)
NEUTROPHILS NFR BLD: 80.3 % (ref 38–73)
NRBC BLD-RTO: 0 /100 WBC
PLATELET # BLD AUTO: 243 K/UL (ref 150–350)
PMV BLD AUTO: 10.4 FL (ref 9.2–12.9)
POCT GLUCOSE: 153 MG/DL (ref 70–110)
POTASSIUM SERPL-SCNC: 3.8 MMOL/L (ref 3.5–5.1)
RBC # BLD AUTO: 4.66 M/UL (ref 4.6–6.2)
SODIUM SERPL-SCNC: 140 MMOL/L (ref 136–145)
WBC # BLD AUTO: 9.86 K/UL (ref 3.9–12.7)

## 2021-02-22 PROCEDURE — 25000003 PHARM REV CODE 250: Performed by: NURSE PRACTITIONER

## 2021-02-22 PROCEDURE — 83735 ASSAY OF MAGNESIUM: CPT

## 2021-02-22 PROCEDURE — 27000221 HC OXYGEN, UP TO 24 HOURS

## 2021-02-22 PROCEDURE — 63600175 PHARM REV CODE 636 W HCPCS: Performed by: INTERNAL MEDICINE

## 2021-02-22 PROCEDURE — 85025 COMPLETE CBC W/AUTO DIFF WBC: CPT

## 2021-02-22 PROCEDURE — 96372 THER/PROPH/DIAG INJ SC/IM: CPT

## 2021-02-22 PROCEDURE — 80048 BASIC METABOLIC PNL TOTAL CA: CPT

## 2021-02-22 PROCEDURE — 99232 SBSQ HOSP IP/OBS MODERATE 35: CPT | Mod: ,,, | Performed by: NURSE PRACTITIONER

## 2021-02-22 PROCEDURE — 99232 PR SUBSEQUENT HOSPITAL CARE,LEVL II: ICD-10-PCS | Mod: ,,, | Performed by: NURSE PRACTITIONER

## 2021-02-22 PROCEDURE — 36415 COLL VENOUS BLD VENIPUNCTURE: CPT

## 2021-02-22 PROCEDURE — 94761 N-INVAS EAR/PLS OXIMETRY MLT: CPT

## 2021-02-22 PROCEDURE — 94640 AIRWAY INHALATION TREATMENT: CPT

## 2021-02-22 PROCEDURE — 25000242 PHARM REV CODE 250 ALT 637 W/ HCPCS: Performed by: NURSE PRACTITIONER

## 2021-02-22 RX ORDER — POTASSIUM CHLORIDE 750 MG/1
10 CAPSULE, EXTENDED RELEASE ORAL DAILY
Qty: 90 CAPSULE | Refills: 0 | Status: SHIPPED | OUTPATIENT
Start: 2021-02-22 | End: 2021-02-22 | Stop reason: SDUPTHER

## 2021-02-22 RX ORDER — BUMETANIDE 1 MG/1
1 TABLET ORAL 2 TIMES DAILY
Qty: 180 TABLET | Refills: 0 | Status: SHIPPED | OUTPATIENT
Start: 2021-02-22 | End: 2022-01-01 | Stop reason: DRUGHIGH

## 2021-02-22 RX ORDER — POTASSIUM CHLORIDE 750 MG/1
10 CAPSULE, EXTENDED RELEASE ORAL DAILY
Qty: 90 CAPSULE | Refills: 0 | Status: SHIPPED | OUTPATIENT
Start: 2021-02-22 | End: 2021-05-23

## 2021-02-22 RX ORDER — POTASSIUM CHLORIDE 750 MG/1
10 CAPSULE, EXTENDED RELEASE ORAL DAILY
Qty: 90 CAPSULE | Refills: 0 | OUTPATIENT
Start: 2021-02-22 | End: 2021-02-22 | Stop reason: SDUPTHER

## 2021-02-22 RX ORDER — METOPROLOL SUCCINATE 25 MG/1
25 TABLET, EXTENDED RELEASE ORAL DAILY
Qty: 90 TABLET | Refills: 3 | Status: SHIPPED | OUTPATIENT
Start: 2021-02-22 | End: 2021-02-22 | Stop reason: SDUPTHER

## 2021-02-22 RX ORDER — BUMETANIDE 1 MG/1
1 TABLET ORAL 2 TIMES DAILY
Qty: 180 TABLET | Refills: 0 | OUTPATIENT
Start: 2021-02-22 | End: 2021-02-22 | Stop reason: SDUPTHER

## 2021-02-22 RX ORDER — METOPROLOL SUCCINATE 25 MG/1
25 TABLET, EXTENDED RELEASE ORAL DAILY
Qty: 90 TABLET | Refills: 3 | Status: ON HOLD | OUTPATIENT
Start: 2021-02-22 | End: 2022-01-01

## 2021-02-22 RX ORDER — BUMETANIDE 1 MG/1
1 TABLET ORAL 2 TIMES DAILY
Qty: 180 TABLET | Refills: 0 | Status: SHIPPED | OUTPATIENT
Start: 2021-02-22 | End: 2021-02-22 | Stop reason: SDUPTHER

## 2021-02-22 RX ORDER — METOPROLOL SUCCINATE 25 MG/1
25 TABLET, EXTENDED RELEASE ORAL DAILY
Qty: 90 TABLET | Refills: 3 | OUTPATIENT
Start: 2021-02-22 | End: 2021-02-22 | Stop reason: SDUPTHER

## 2021-02-22 RX ADMIN — IPRATROPIUM BROMIDE AND ALBUTEROL SULFATE 3 ML: .5; 3 SOLUTION RESPIRATORY (INHALATION) at 12:02

## 2021-02-22 RX ADMIN — BUDESONIDE 0.5 MG: 0.5 SUSPENSION RESPIRATORY (INHALATION) at 08:02

## 2021-02-22 RX ADMIN — LOSARTAN POTASSIUM 25 MG: 25 TABLET, FILM COATED ORAL at 08:02

## 2021-02-22 RX ADMIN — CLOPIDOGREL 75 MG: 75 TABLET, FILM COATED ORAL at 08:02

## 2021-02-22 RX ADMIN — POTASSIUM CHLORIDE 40 MEQ: 1500 TABLET, EXTENDED RELEASE ORAL at 08:02

## 2021-02-22 RX ADMIN — INSULIN ASPART 2 UNITS: 100 INJECTION, SOLUTION INTRAVENOUS; SUBCUTANEOUS at 05:02

## 2021-02-22 RX ADMIN — FUROSEMIDE 40 MG: 10 INJECTION, SOLUTION INTRAMUSCULAR; INTRAVENOUS at 06:02

## 2021-02-22 RX ADMIN — ATORVASTATIN CALCIUM 80 MG: 40 TABLET, FILM COATED ORAL at 08:02

## 2021-02-22 RX ADMIN — VITAM B12 100 MCG: 100 TAB at 08:02

## 2021-02-22 RX ADMIN — IPRATROPIUM BROMIDE AND ALBUTEROL SULFATE 3 ML: .5; 3 SOLUTION RESPIRATORY (INHALATION) at 08:02

## 2021-02-23 ENCOUNTER — PATIENT OUTREACH (OUTPATIENT)
Dept: ADMINISTRATIVE | Facility: CLINIC | Age: 71
End: 2021-02-23

## 2021-02-23 DIAGNOSIS — I50.43 ACUTE ON CHRONIC COMBINED SYSTOLIC AND DIASTOLIC HEART FAILURE: Primary | ICD-10-CM

## 2021-02-24 ENCOUNTER — TELEPHONE (OUTPATIENT)
Dept: TRANSPLANT | Facility: CLINIC | Age: 71
End: 2021-02-24

## 2021-10-04 ENCOUNTER — TELEPHONE (OUTPATIENT)
Dept: UROLOGY | Facility: CLINIC | Age: 71
End: 2021-10-04

## 2021-10-04 ENCOUNTER — HOSPITAL ENCOUNTER (EMERGENCY)
Facility: HOSPITAL | Age: 71
Discharge: HOME | End: 2021-10-04
Attending: EMERGENCY MEDICINE
Payer: OTHER GOVERNMENT

## 2021-10-04 VITALS
HEIGHT: 68 IN | HEART RATE: 93 BPM | DIASTOLIC BLOOD PRESSURE: 61 MMHG | TEMPERATURE: 98 F | SYSTOLIC BLOOD PRESSURE: 116 MMHG | WEIGHT: 287 LBS | BODY MASS INDEX: 43.5 KG/M2 | RESPIRATION RATE: 20 BRPM | OXYGEN SATURATION: 94 %

## 2021-10-04 DIAGNOSIS — N50.89 TESTICULAR SWELLING: ICD-10-CM

## 2021-10-04 DIAGNOSIS — R60.9 SWELLING: ICD-10-CM

## 2021-10-04 DIAGNOSIS — N28.9 RENAL INSUFFICIENCY: ICD-10-CM

## 2021-10-04 DIAGNOSIS — N43.3 HYDROCELE, UNSPECIFIED HYDROCELE TYPE: Primary | ICD-10-CM

## 2021-10-04 LAB
ALBUMIN SERPL BCP-MCNC: 2.8 G/DL (ref 3.5–5.2)
ALP SERPL-CCNC: 123 U/L (ref 55–135)
ALT SERPL W/O P-5'-P-CCNC: 7 U/L (ref 10–44)
ANION GAP SERPL CALC-SCNC: 12 MMOL/L (ref 8–16)
AST SERPL-CCNC: 9 U/L (ref 10–40)
BASOPHILS # BLD AUTO: 0.03 K/UL (ref 0–0.2)
BASOPHILS NFR BLD: 0.3 % (ref 0–1.9)
BILIRUB SERPL-MCNC: 0.5 MG/DL (ref 0.1–1)
BNP SERPL-MCNC: 75 PG/ML (ref 0–99)
BUN SERPL-MCNC: 11 MG/DL (ref 8–23)
CALCIUM SERPL-MCNC: 8.7 MG/DL (ref 8.7–10.5)
CHLORIDE SERPL-SCNC: 97 MMOL/L (ref 95–110)
CO2 SERPL-SCNC: 28 MMOL/L (ref 23–29)
CREAT SERPL-MCNC: 1.5 MG/DL (ref 0.5–1.4)
DIFFERENTIAL METHOD: ABNORMAL
EOSINOPHIL # BLD AUTO: 0.1 K/UL (ref 0–0.5)
EOSINOPHIL NFR BLD: 1.4 % (ref 0–8)
ERYTHROCYTE [DISTWIDTH] IN BLOOD BY AUTOMATED COUNT: 21.4 % (ref 11.5–14.5)
EST. GFR  (AFRICAN AMERICAN): 53.8 ML/MIN/1.73 M^2
EST. GFR  (NON AFRICAN AMERICAN): 46.5 ML/MIN/1.73 M^2
GLUCOSE SERPL-MCNC: 82 MG/DL (ref 70–110)
HCT VFR BLD AUTO: 40.6 % (ref 40–54)
HGB BLD-MCNC: 11.7 G/DL (ref 14–18)
IMM GRANULOCYTES # BLD AUTO: 0.03 K/UL (ref 0–0.04)
IMM GRANULOCYTES NFR BLD AUTO: 0.3 % (ref 0–0.5)
LYMPHOCYTES # BLD AUTO: 0.8 K/UL (ref 1–4.8)
LYMPHOCYTES NFR BLD: 8.8 % (ref 18–48)
MCH RBC QN AUTO: 23.1 PG (ref 27–31)
MCHC RBC AUTO-ENTMCNC: 28.8 G/DL (ref 32–36)
MCV RBC AUTO: 80 FL (ref 82–98)
MONOCYTES # BLD AUTO: 1 K/UL (ref 0.3–1)
MONOCYTES NFR BLD: 10.9 % (ref 4–15)
NEUTROPHILS # BLD AUTO: 7.1 K/UL (ref 1.8–7.7)
NEUTROPHILS NFR BLD: 78.3 % (ref 38–73)
NRBC BLD-RTO: 0 /100 WBC
PLATELET # BLD AUTO: 256 K/UL (ref 150–450)
PMV BLD AUTO: 10.1 FL (ref 9.2–12.9)
POTASSIUM SERPL-SCNC: 3.8 MMOL/L (ref 3.5–5.1)
PROT SERPL-MCNC: 7.5 G/DL (ref 6–8.4)
RBC # BLD AUTO: 5.06 M/UL (ref 4.6–6.2)
SODIUM SERPL-SCNC: 137 MMOL/L (ref 136–145)
WBC # BLD AUTO: 9.09 K/UL (ref 3.9–12.7)

## 2021-10-04 PROCEDURE — 83880 ASSAY OF NATRIURETIC PEPTIDE: CPT | Mod: ER | Performed by: EMERGENCY MEDICINE

## 2021-10-04 PROCEDURE — 99285 EMERGENCY DEPT VISIT HI MDM: CPT | Mod: 25,ER

## 2021-10-04 PROCEDURE — 80053 COMPREHEN METABOLIC PANEL: CPT | Mod: ER | Performed by: EMERGENCY MEDICINE

## 2021-10-04 PROCEDURE — 85025 COMPLETE CBC W/AUTO DIFF WBC: CPT | Mod: ER | Performed by: EMERGENCY MEDICINE

## 2021-10-04 RX ORDER — METOLAZONE 5 MG/1
5 TABLET ORAL DAILY PRN
Qty: 5 TABLET | Refills: 0 | Status: SHIPPED | OUTPATIENT
Start: 2021-10-04 | End: 2022-01-01

## 2021-10-04 RX ORDER — ALBUTEROL SULFATE 90 UG/1
2 AEROSOL, METERED RESPIRATORY (INHALATION) EVERY 6 HOURS PRN
COMMUNITY

## 2021-10-05 ENCOUNTER — TELEPHONE (OUTPATIENT)
Dept: UROLOGY | Facility: CLINIC | Age: 71
End: 2021-10-05

## 2021-10-12 ENCOUNTER — HOSPITAL ENCOUNTER (EMERGENCY)
Facility: HOSPITAL | Age: 71
Discharge: HOME | End: 2021-10-12
Attending: EMERGENCY MEDICINE
Payer: OTHER GOVERNMENT

## 2021-10-12 VITALS
DIASTOLIC BLOOD PRESSURE: 57 MMHG | HEART RATE: 73 BPM | WEIGHT: 262.38 LBS | OXYGEN SATURATION: 95 % | BODY MASS INDEX: 41.18 KG/M2 | RESPIRATION RATE: 18 BRPM | HEIGHT: 67 IN | SYSTOLIC BLOOD PRESSURE: 101 MMHG

## 2021-10-12 DIAGNOSIS — N30.00 ACUTE CYSTITIS WITHOUT HEMATURIA: ICD-10-CM

## 2021-10-12 DIAGNOSIS — N43.3 HYDROCELE, BILATERAL: Primary | ICD-10-CM

## 2021-10-12 LAB
BACTERIA #/AREA URNS AUTO: ABNORMAL /HPF
BILIRUB UR QL STRIP: NEGATIVE
CLARITY UR REFRACT.AUTO: ABNORMAL
COLOR UR AUTO: YELLOW
GLUCOSE UR QL STRIP: NEGATIVE
HGB UR QL STRIP: NEGATIVE
KETONES UR QL STRIP: NEGATIVE
LEUKOCYTE ESTERASE UR QL STRIP: ABNORMAL
MICROSCOPIC COMMENT: ABNORMAL
NITRITE UR QL STRIP: POSITIVE
PH UR STRIP: 7 [PH] (ref 5–8)
PROT UR QL STRIP: ABNORMAL
SP GR UR STRIP: 1.01 (ref 1–1.03)
SQUAMOUS #/AREA URNS AUTO: 1 /HPF
URN SPEC COLLECT METH UR: ABNORMAL
UROBILINOGEN UR STRIP-ACNC: <2 EU/DL
WBC #/AREA URNS AUTO: 35 /HPF (ref 0–5)
WBC CLUMPS UR QL AUTO: ABNORMAL

## 2021-10-12 PROCEDURE — 87186 SC STD MICRODIL/AGAR DIL: CPT | Performed by: NURSE PRACTITIONER

## 2021-10-12 PROCEDURE — 87077 CULTURE AEROBIC IDENTIFY: CPT | Performed by: NURSE PRACTITIONER

## 2021-10-12 PROCEDURE — 87086 URINE CULTURE/COLONY COUNT: CPT | Performed by: NURSE PRACTITIONER

## 2021-10-12 PROCEDURE — 99283 EMERGENCY DEPT VISIT LOW MDM: CPT | Mod: 25,ER

## 2021-10-12 PROCEDURE — 81000 URINALYSIS NONAUTO W/SCOPE: CPT | Mod: ER | Performed by: NURSE PRACTITIONER

## 2021-10-12 PROCEDURE — 87088 URINE BACTERIA CULTURE: CPT | Performed by: NURSE PRACTITIONER

## 2021-10-12 RX ORDER — CEPHALEXIN 500 MG/1
500 CAPSULE ORAL 3 TIMES DAILY
Qty: 21 CAPSULE | Refills: 0 | Status: SHIPPED | OUTPATIENT
Start: 2021-10-12 | End: 2021-10-19

## 2021-10-12 RX ORDER — CEPHALEXIN 500 MG/1
500 CAPSULE ORAL 3 TIMES DAILY
Qty: 21 CAPSULE | Refills: 0 | Status: SHIPPED | OUTPATIENT
Start: 2021-10-12 | End: 2021-10-12 | Stop reason: SDUPTHER

## 2021-10-15 LAB — BACTERIA UR CULT: ABNORMAL

## 2021-10-19 ENCOUNTER — OFFICE VISIT (OUTPATIENT)
Dept: UROLOGY | Facility: CLINIC | Age: 71
End: 2021-10-19
Payer: OTHER GOVERNMENT

## 2021-10-19 VITALS
DIASTOLIC BLOOD PRESSURE: 60 MMHG | HEART RATE: 74 BPM | SYSTOLIC BLOOD PRESSURE: 118 MMHG | WEIGHT: 265.88 LBS | HEIGHT: 67 IN | BODY MASS INDEX: 41.73 KG/M2

## 2021-10-19 DIAGNOSIS — N43.3 HYDROCELE, BILATERAL: Primary | ICD-10-CM

## 2021-10-19 PROCEDURE — 99203 PR OFFICE/OUTPT VISIT, NEW, LEVL III, 30-44 MIN: ICD-10-PCS | Mod: S$PBB,,, | Performed by: UROLOGY

## 2021-10-19 PROCEDURE — 99999 PR PBB SHADOW E&M-EST. PATIENT-LVL IV: ICD-10-PCS | Mod: PBBFAC,,, | Performed by: UROLOGY

## 2021-10-19 PROCEDURE — 99999 PR PBB SHADOW E&M-EST. PATIENT-LVL IV: CPT | Mod: PBBFAC,,, | Performed by: UROLOGY

## 2021-10-19 PROCEDURE — 99214 OFFICE O/P EST MOD 30 MIN: CPT | Mod: PBBFAC | Performed by: UROLOGY

## 2021-10-19 PROCEDURE — 99203 OFFICE O/P NEW LOW 30 MIN: CPT | Mod: S$PBB,,, | Performed by: UROLOGY

## 2021-10-21 ENCOUNTER — TELEPHONE (OUTPATIENT)
Dept: UROLOGY | Facility: CLINIC | Age: 71
End: 2021-10-21

## 2021-10-25 ENCOUNTER — TELEPHONE (OUTPATIENT)
Dept: UROLOGY | Facility: CLINIC | Age: 71
End: 2021-10-25
Payer: OTHER GOVERNMENT

## 2021-10-26 ENCOUNTER — TELEPHONE (OUTPATIENT)
Dept: UROLOGY | Facility: CLINIC | Age: 71
End: 2021-10-26
Payer: OTHER GOVERNMENT

## 2021-10-28 ENCOUNTER — PROCEDURE VISIT (OUTPATIENT)
Dept: UROLOGY | Facility: CLINIC | Age: 71
End: 2021-10-28
Payer: OTHER GOVERNMENT

## 2021-10-28 VITALS — DIASTOLIC BLOOD PRESSURE: 58 MMHG | SYSTOLIC BLOOD PRESSURE: 114 MMHG

## 2021-10-28 DIAGNOSIS — N43.2 OTHER HYDROCELE: Primary | ICD-10-CM

## 2021-10-28 PROCEDURE — 55000 DRAINAGE OF HYDROCELE: CPT | Mod: S$PBB,50,, | Performed by: UROLOGY

## 2021-10-28 PROCEDURE — 55000 DRAINAGE OF HYDROCELE: CPT | Mod: PBBFAC | Performed by: UROLOGY

## 2021-10-28 PROCEDURE — 55000 PR DRAINAGE OF HYDROCELE,TUNICA: ICD-10-PCS | Mod: S$PBB,50,, | Performed by: UROLOGY

## 2021-11-23 ENCOUNTER — OFFICE VISIT (OUTPATIENT)
Dept: UROLOGY | Facility: CLINIC | Age: 71
End: 2021-11-23
Payer: OTHER GOVERNMENT

## 2021-11-23 VITALS
BODY MASS INDEX: 41.77 KG/M2 | SYSTOLIC BLOOD PRESSURE: 104 MMHG | TEMPERATURE: 97 F | HEIGHT: 67 IN | WEIGHT: 266.13 LBS | HEART RATE: 80 BPM | DIASTOLIC BLOOD PRESSURE: 64 MMHG

## 2021-11-23 DIAGNOSIS — N43.3 HYDROCELE, UNSPECIFIED HYDROCELE TYPE: Primary | ICD-10-CM

## 2021-11-23 PROCEDURE — 99213 PR OFFICE/OUTPT VISIT, EST, LEVL III, 20-29 MIN: ICD-10-PCS | Mod: S$PBB,,, | Performed by: UROLOGY

## 2021-11-23 PROCEDURE — 99999 PR PBB SHADOW E&M-EST. PATIENT-LVL V: CPT | Mod: PBBFAC,,, | Performed by: UROLOGY

## 2021-11-23 PROCEDURE — 99215 OFFICE O/P EST HI 40 MIN: CPT | Mod: PBBFAC | Performed by: UROLOGY

## 2021-11-23 PROCEDURE — 99999 PR PBB SHADOW E&M-EST. PATIENT-LVL V: ICD-10-PCS | Mod: PBBFAC,,, | Performed by: UROLOGY

## 2021-11-23 PROCEDURE — 99213 OFFICE O/P EST LOW 20 MIN: CPT | Mod: S$PBB,,, | Performed by: UROLOGY

## 2022-01-01 ENCOUNTER — TELEPHONE (OUTPATIENT)
Dept: UROLOGY | Facility: CLINIC | Age: 72
End: 2022-01-01
Payer: OTHER GOVERNMENT

## 2022-01-01 ENCOUNTER — HOSPITAL ENCOUNTER (OUTPATIENT)
Dept: RADIOLOGY | Facility: HOSPITAL | Age: 72
Discharge: HOME | End: 2022-09-09
Attending: UROLOGY
Payer: OTHER GOVERNMENT

## 2022-01-01 ENCOUNTER — ANESTHESIA (OUTPATIENT)
Dept: ENDOSCOPY | Facility: HOSPITAL | Age: 72
DRG: 378 | End: 2022-01-01
Payer: MEDICARE

## 2022-01-01 ENCOUNTER — HOSPITAL ENCOUNTER (EMERGENCY)
Facility: HOSPITAL | Age: 72
Discharge: HOME | End: 2022-08-12
Attending: EMERGENCY MEDICINE
Payer: OTHER GOVERNMENT

## 2022-01-01 ENCOUNTER — OFFICE VISIT (OUTPATIENT)
Dept: UROLOGY | Facility: CLINIC | Age: 72
End: 2022-01-01
Payer: OTHER GOVERNMENT

## 2022-01-01 ENCOUNTER — HOSPITAL ENCOUNTER (INPATIENT)
Facility: HOSPITAL | Age: 72
LOS: 4 days | Discharge: HOME | DRG: 378 | End: 2022-11-19
Attending: EMERGENCY MEDICINE | Admitting: INTERNAL MEDICINE
Payer: MEDICARE

## 2022-01-01 ENCOUNTER — ANESTHESIA EVENT (OUTPATIENT)
Dept: ENDOSCOPY | Facility: HOSPITAL | Age: 72
DRG: 378 | End: 2022-01-01
Payer: MEDICARE

## 2022-01-01 VITALS
BODY MASS INDEX: 42.44 KG/M2 | DIASTOLIC BLOOD PRESSURE: 67 MMHG | SYSTOLIC BLOOD PRESSURE: 101 MMHG | HEIGHT: 68 IN | RESPIRATION RATE: 18 BRPM | HEART RATE: 78 BPM | WEIGHT: 280 LBS

## 2022-01-01 VITALS
TEMPERATURE: 98 F | DIASTOLIC BLOOD PRESSURE: 58 MMHG | OXYGEN SATURATION: 96 % | WEIGHT: 278.44 LBS | SYSTOLIC BLOOD PRESSURE: 104 MMHG | HEART RATE: 83 BPM | RESPIRATION RATE: 16 BRPM | BODY MASS INDEX: 43.61 KG/M2

## 2022-01-01 VITALS
OXYGEN SATURATION: 93 % | DIASTOLIC BLOOD PRESSURE: 62 MMHG | BODY MASS INDEX: 41.83 KG/M2 | HEIGHT: 68 IN | HEART RATE: 101 BPM | SYSTOLIC BLOOD PRESSURE: 110 MMHG | RESPIRATION RATE: 18 BRPM | TEMPERATURE: 98 F | WEIGHT: 276 LBS

## 2022-01-01 DIAGNOSIS — K92.2 UPPER GI BLEED: ICD-10-CM

## 2022-01-01 DIAGNOSIS — D62 ACUTE BLOOD LOSS ANEMIA: ICD-10-CM

## 2022-01-01 DIAGNOSIS — N43.3 HYDROCELE, BILATERAL: ICD-10-CM

## 2022-01-01 DIAGNOSIS — N43.3 HYDROCELE, UNSPECIFIED HYDROCELE TYPE: Primary | ICD-10-CM

## 2022-01-01 DIAGNOSIS — K92.1 BLOOD IN STOOL: ICD-10-CM

## 2022-01-01 DIAGNOSIS — I95.9 HYPOTENSION, UNSPECIFIED HYPOTENSION TYPE: Primary | ICD-10-CM

## 2022-01-01 DIAGNOSIS — R51.9 HEADACHE: ICD-10-CM

## 2022-01-01 DIAGNOSIS — R07.9 CHEST PAIN: ICD-10-CM

## 2022-01-01 DIAGNOSIS — I50.42 CHRONIC COMBINED SYSTOLIC AND DIASTOLIC HEART FAILURE: ICD-10-CM
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
## 2022-01-01 DIAGNOSIS — I45.10 NEW ONSET RIGHT BUNDLE BRANCH BLOCK (RBBB): ICD-10-CM

## 2022-01-01 DIAGNOSIS — I45.10 RBBB: ICD-10-CM

## 2022-01-01 DIAGNOSIS — N18.30 STAGE 3 CHRONIC KIDNEY DISEASE, UNSPECIFIED WHETHER STAGE 3A OR 3B CKD: ICD-10-CM

## 2022-01-01 DIAGNOSIS — M25.562 LEFT KNEE PAIN, UNSPECIFIED CHRONICITY: Primary | ICD-10-CM

## 2022-01-01 DIAGNOSIS — I50.9 CHF (CONGESTIVE HEART FAILURE): ICD-10-CM

## 2022-01-01 DIAGNOSIS — D62 ACUTE BLOOD LOSS ANEMIA: Primary | ICD-10-CM

## 2022-01-01 DIAGNOSIS — K92.0 HEMATEMESIS WITHOUT NAUSEA: ICD-10-CM

## 2022-01-01 DIAGNOSIS — Z45.2 ENCOUNTER FOR CENTRAL LINE PLACEMENT: ICD-10-CM

## 2022-01-01 LAB
ABO + RH BLD: NORMAL
ALBUMIN SERPL BCP-MCNC: 2.7 G/DL (ref 3.5–5.2)
ALP SERPL-CCNC: 91 U/L (ref 55–135)
ALT SERPL W/O P-5'-P-CCNC: 10 U/L (ref 10–44)
ANION GAP SERPL CALC-SCNC: 10 MMOL/L (ref 8–16)
ANION GAP SERPL CALC-SCNC: 4 MMOL/L (ref 8–16)
ANION GAP SERPL CALC-SCNC: 5 MMOL/L (ref 8–16)
ANION GAP SERPL CALC-SCNC: 6 MMOL/L (ref 8–16)
ANION GAP SERPL CALC-SCNC: 6 MMOL/L (ref 8–16)
AORTIC ROOT ANNULUS: 3.4 CM
ASCENDING AORTA: 3.52 CM
AST SERPL-CCNC: 10 U/L (ref 10–40)
AV INDEX (PROSTH): 0.44
AV MEAN GRADIENT: 25 MMHG
AV PEAK GRADIENT: 33 MMHG
AV REGURGITATION PRESSURE HALF TIME: 860.11 MS
AV VALVE AREA: 1.2 CM2
AV VELOCITY RATIO: 0.44
BACTERIA #/AREA URNS AUTO: NORMAL /HPF
BACTERIA BLD CULT: NORMAL
BACTERIA BLD CULT: NORMAL
BASOPHILS # BLD AUTO: 0.03 K/UL (ref 0–0.2)
BASOPHILS # BLD AUTO: 0.03 K/UL (ref 0–0.2)
BASOPHILS # BLD AUTO: 0.04 K/UL (ref 0–0.2)
BASOPHILS # BLD AUTO: 0.05 K/UL (ref 0–0.2)
BASOPHILS NFR BLD: 0.2 % (ref 0–1.9)
BASOPHILS NFR BLD: 0.3 % (ref 0–1.9)
BASOPHILS NFR BLD: 0.4 % (ref 0–1.9)
BASOPHILS NFR BLD: 0.5 % (ref 0–1.9)
BILIRUB SERPL-MCNC: 0.3 MG/DL (ref 0.1–1)
BILIRUB UR QL STRIP: NEGATIVE
BLD GP AB SCN CELLS X3 SERPL QL: NORMAL
BLD PROD TYP BPU: NORMAL
BLD PROD TYP BPU: NORMAL
BLOOD UNIT EXPIRATION DATE: NORMAL
BLOOD UNIT EXPIRATION DATE: NORMAL
BLOOD UNIT TYPE CODE: 5100
BLOOD UNIT TYPE CODE: 5100
BLOOD UNIT TYPE: NORMAL
BLOOD UNIT TYPE: NORMAL
BNP SERPL-MCNC: 22 PG/ML (ref 0–99)
BSA FOR ECHO PROCEDURE: 2.47 M2
BUN SERPL-MCNC: 14 MG/DL (ref 8–23)
BUN SERPL-MCNC: 20 MG/DL (ref 8–23)
BUN SERPL-MCNC: 38 MG/DL (ref 8–23)
BUN SERPL-MCNC: 38 MG/DL (ref 8–23)
BUN SERPL-MCNC: 43 MG/DL (ref 8–23)
CALCIUM SERPL-MCNC: 7.5 MG/DL (ref 8.7–10.5)
CALCIUM SERPL-MCNC: 7.7 MG/DL (ref 8.7–10.5)
CALCIUM SERPL-MCNC: 7.8 MG/DL (ref 8.7–10.5)
CALCIUM SERPL-MCNC: 8.3 MG/DL (ref 8.7–10.5)
CALCIUM SERPL-MCNC: 8.3 MG/DL (ref 8.7–10.5)
CHLORIDE SERPL-SCNC: 103 MMOL/L (ref 95–110)
CHLORIDE SERPL-SCNC: 103 MMOL/L (ref 95–110)
CHLORIDE SERPL-SCNC: 104 MMOL/L (ref 95–110)
CHLORIDE SERPL-SCNC: 105 MMOL/L (ref 95–110)
CHLORIDE SERPL-SCNC: 109 MMOL/L (ref 95–110)
CLARITY UR REFRACT.AUTO: CLEAR
CO2 SERPL-SCNC: 23 MMOL/L (ref 23–29)
CO2 SERPL-SCNC: 25 MMOL/L (ref 23–29)
CO2 SERPL-SCNC: 26 MMOL/L (ref 23–29)
CO2 SERPL-SCNC: 28 MMOL/L (ref 23–29)
CO2 SERPL-SCNC: 28 MMOL/L (ref 23–29)
CODING SYSTEM: NORMAL
CODING SYSTEM: NORMAL
COLOR UR AUTO: YELLOW
CREAT SERPL-MCNC: 0.9 MG/DL (ref 0.5–1.4)
CREAT SERPL-MCNC: 1 MG/DL (ref 0.5–1.4)
CREAT SERPL-MCNC: 1.1 MG/DL (ref 0.5–1.4)
CREAT SERPL-MCNC: 1.2 MG/DL (ref 0.5–1.4)
CREAT SERPL-MCNC: 1.7 MG/DL (ref 0.5–1.4)
CTP QC/QA: YES
CV ECHO LV RWT: 0.51 CM
DIFFERENTIAL METHOD: ABNORMAL
DISPENSE STATUS: NORMAL
DISPENSE STATUS: NORMAL
DOP CALC AO PEAK VEL: 2.89 M/S
DOP CALC AO VTI: 60.9 CM
DOP CALC LVOT AREA: 2.7 CM2
DOP CALC LVOT DIAMETER: 1.86 CM
DOP CALC LVOT PEAK VEL: 1.26 M/S
DOP CALC LVOT STROKE VOLUME: 73.33 CM3
DOP CALC RVOT PEAK VEL: 0.85 M/S
DOP CALC RVOT VTI: 16.1 CM
DOP CALCLVOT PEAK VEL VTI: 27 CM
E WAVE DECELERATION TIME: 243.87 MSEC
E/A RATIO: 0.82
E/E' RATIO: 11.08 M/S
ECHO LV POSTERIOR WALL: 1.29 CM (ref 0.6–1.1)
EJECTION FRACTION: 35 %
EOSINOPHIL # BLD AUTO: 0 K/UL (ref 0–0.5)
EOSINOPHIL # BLD AUTO: 0.1 K/UL (ref 0–0.5)
EOSINOPHIL # BLD AUTO: 0.2 K/UL (ref 0–0.5)
EOSINOPHIL NFR BLD: 0.3 % (ref 0–8)
EOSINOPHIL NFR BLD: 0.5 % (ref 0–8)
EOSINOPHIL NFR BLD: 1 % (ref 0–8)
EOSINOPHIL NFR BLD: 1 % (ref 0–8)
EOSINOPHIL NFR BLD: 1.1 % (ref 0–8)
EOSINOPHIL NFR BLD: 2 % (ref 0–8)
ERYTHROCYTE [DISTWIDTH] IN BLOOD BY AUTOMATED COUNT: 15.9 % (ref 11.5–14.5)
ERYTHROCYTE [DISTWIDTH] IN BLOOD BY AUTOMATED COUNT: 16 % (ref 11.5–14.5)
ERYTHROCYTE [DISTWIDTH] IN BLOOD BY AUTOMATED COUNT: 16.6 % (ref 11.5–14.5)
ERYTHROCYTE [DISTWIDTH] IN BLOOD BY AUTOMATED COUNT: 16.8 % (ref 11.5–14.5)
ERYTHROCYTE [DISTWIDTH] IN BLOOD BY AUTOMATED COUNT: 17 % (ref 11.5–14.5)
ERYTHROCYTE [DISTWIDTH] IN BLOOD BY AUTOMATED COUNT: 17.2 % (ref 11.5–14.5)
EST. GFR  (NO RACE VARIABLE): 42.6 ML/MIN/1.73 M^2
EST. GFR  (NO RACE VARIABLE): >60 ML/MIN/1.73 M^2
ESTIMATED AVG GLUCOSE: 134 MG/DL (ref 68–131)
FRACTIONAL SHORTENING: 24 % (ref 28–44)
GLUCOSE SERPL-MCNC: 76 MG/DL (ref 70–110)
GLUCOSE SERPL-MCNC: 85 MG/DL (ref 70–110)
GLUCOSE SERPL-MCNC: 89 MG/DL (ref 70–110)
GLUCOSE SERPL-MCNC: 90 MG/DL (ref 70–110)
GLUCOSE SERPL-MCNC: 98 MG/DL (ref 70–110)
GLUCOSE UR QL STRIP: NEGATIVE
HBA1C MFR BLD: 6.3 % (ref 4–5.6)
HCT VFR BLD AUTO: 22.9 % (ref 40–54)
HCT VFR BLD AUTO: 22.9 % (ref 40–54)
HCT VFR BLD AUTO: 23.2 % (ref 40–54)
HCT VFR BLD AUTO: 24.2 % (ref 40–54)
HCT VFR BLD AUTO: 24.6 % (ref 40–54)
HCT VFR BLD AUTO: 24.7 % (ref 40–54)
HCT VFR BLD AUTO: 25.1 % (ref 40–54)
HCT VFR BLD AUTO: 25.1 % (ref 40–54)
HCT VFR BLD AUTO: 25.9 % (ref 40–54)
HCT VFR BLD AUTO: 27.2 % (ref 40–54)
HCT VFR BLD AUTO: 28.2 % (ref 40–54)
HCT VFR BLD AUTO: 28.2 % (ref 40–54)
HCT VFR BLD AUTO: 34.8 % (ref 40–54)
HGB BLD-MCNC: 11.1 G/DL (ref 14–18)
HGB BLD-MCNC: 7.2 G/DL (ref 14–18)
HGB BLD-MCNC: 7.3 G/DL (ref 14–18)
HGB BLD-MCNC: 7.4 G/DL (ref 14–18)
HGB BLD-MCNC: 7.6 G/DL (ref 14–18)
HGB BLD-MCNC: 7.9 G/DL (ref 14–18)
HGB BLD-MCNC: 8.1 G/DL (ref 14–18)
HGB BLD-MCNC: 8.5 G/DL (ref 14–18)
HGB BLD-MCNC: 8.6 G/DL (ref 14–18)
HGB BLD-MCNC: 8.9 G/DL (ref 14–18)
HGB BLD-MCNC: 9 G/DL (ref 14–18)
HGB UR QL STRIP: ABNORMAL
IMM GRANULOCYTES # BLD AUTO: 0.03 K/UL (ref 0–0.04)
IMM GRANULOCYTES # BLD AUTO: 0.04 K/UL (ref 0–0.04)
IMM GRANULOCYTES # BLD AUTO: 0.06 K/UL (ref 0–0.04)
IMM GRANULOCYTES # BLD AUTO: 0.06 K/UL (ref 0–0.04)
IMM GRANULOCYTES # BLD AUTO: 0.08 K/UL (ref 0–0.04)
IMM GRANULOCYTES # BLD AUTO: 0.08 K/UL (ref 0–0.04)
IMM GRANULOCYTES NFR BLD AUTO: 0.4 % (ref 0–0.5)
IMM GRANULOCYTES NFR BLD AUTO: 0.4 % (ref 0–0.5)
IMM GRANULOCYTES NFR BLD AUTO: 0.5 % (ref 0–0.5)
IMM GRANULOCYTES NFR BLD AUTO: 0.6 % (ref 0–0.5)
IMM GRANULOCYTES NFR BLD AUTO: 0.6 % (ref 0–0.5)
IMM GRANULOCYTES NFR BLD AUTO: 0.8 % (ref 0–0.5)
INR PPP: 1 (ref 0.8–1.2)
INTERVENTRICULAR SEPTUM: 1.5 CM (ref 0.6–1.1)
IVC DIAMETER: 1.98 CM
IVRT: 79.92 MSEC
KETONES UR QL STRIP: NEGATIVE
LA MAJOR: 4.52 CM
LA MINOR: 4.51 CM
LA WIDTH: 3.1 CM
LACTATE SERPL-SCNC: 1.1 MMOL/L (ref 0.5–2.2)
LACTATE SERPL-SCNC: 1.8 MMOL/L (ref 0.5–2.2)
LEFT ATRIUM SIZE: 3.61 CM
LEFT ATRIUM VOLUME INDEX: 18.2 ML/M2
LEFT ATRIUM VOLUME: 42.95 CM3
LEFT INTERNAL DIMENSION IN SYSTOLE: 3.88 CM (ref 2.1–4)
LEFT VENTRICLE DIASTOLIC VOLUME INDEX: 52.09 ML/M2
LEFT VENTRICLE DIASTOLIC VOLUME: 122.94 ML
LEFT VENTRICLE MASS INDEX: 126 G/M2
LEFT VENTRICLE SYSTOLIC VOLUME INDEX: 27.5 ML/M2
LEFT VENTRICLE SYSTOLIC VOLUME: 64.91 ML
LEFT VENTRICULAR INTERNAL DIMENSION IN DIASTOLE: 5.08 CM (ref 3.5–6)
LEFT VENTRICULAR MASS: 297.08 G
LEUKOCYTE ESTERASE UR QL STRIP: ABNORMAL
LIPASE SERPL-CCNC: 16 U/L (ref 4–60)
LV LATERAL E/E' RATIO: 12 M/S
LV SEPTAL E/E' RATIO: 10.29 M/S
LVOT MG: 5.29 MMHG
LVOT MV: 1.15 CM/S
LYMPHOCYTES # BLD AUTO: 0.8 K/UL (ref 1–4.8)
LYMPHOCYTES # BLD AUTO: 0.8 K/UL (ref 1–4.8)
LYMPHOCYTES # BLD AUTO: 0.9 K/UL (ref 1–4.8)
LYMPHOCYTES # BLD AUTO: 1.1 K/UL (ref 1–4.8)
LYMPHOCYTES # BLD AUTO: 1.2 K/UL (ref 1–4.8)
LYMPHOCYTES # BLD AUTO: 1.2 K/UL (ref 1–4.8)
LYMPHOCYTES NFR BLD: 11.5 % (ref 18–48)
LYMPHOCYTES NFR BLD: 11.7 % (ref 18–48)
LYMPHOCYTES NFR BLD: 12 % (ref 18–48)
LYMPHOCYTES NFR BLD: 6.4 % (ref 18–48)
LYMPHOCYTES NFR BLD: 8.2 % (ref 18–48)
LYMPHOCYTES NFR BLD: 9.5 % (ref 18–48)
MAGNESIUM SERPL-MCNC: 1.8 MG/DL (ref 1.6–2.6)
MCH RBC QN AUTO: 27.1 PG (ref 27–31)
MCH RBC QN AUTO: 27.5 PG (ref 27–31)
MCH RBC QN AUTO: 27.8 PG (ref 27–31)
MCH RBC QN AUTO: 28.5 PG (ref 27–31)
MCH RBC QN AUTO: 28.6 PG (ref 27–31)
MCH RBC QN AUTO: 28.7 PG (ref 27–31)
MCHC RBC AUTO-ENTMCNC: 31.4 G/DL (ref 32–36)
MCHC RBC AUTO-ENTMCNC: 31.5 G/DL (ref 32–36)
MCHC RBC AUTO-ENTMCNC: 31.5 G/DL (ref 32–36)
MCHC RBC AUTO-ENTMCNC: 31.6 G/DL (ref 32–36)
MCHC RBC AUTO-ENTMCNC: 31.9 G/DL (ref 32–36)
MCHC RBC AUTO-ENTMCNC: 31.9 G/DL (ref 32–36)
MCV RBC AUTO: 86 FL (ref 82–98)
MCV RBC AUTO: 86 FL (ref 82–98)
MCV RBC AUTO: 88 FL (ref 82–98)
MCV RBC AUTO: 90 FL (ref 82–98)
MCV RBC AUTO: 91 FL (ref 82–98)
MCV RBC AUTO: 91 FL (ref 82–98)
MICROSCOPIC COMMENT: NORMAL
MONOCYTES # BLD AUTO: 0.7 K/UL (ref 0.3–1)
MONOCYTES # BLD AUTO: 0.7 K/UL (ref 0.3–1)
MONOCYTES # BLD AUTO: 0.9 K/UL (ref 0.3–1)
MONOCYTES # BLD AUTO: 1.1 K/UL (ref 0.3–1)
MONOCYTES NFR BLD: 10.4 % (ref 4–15)
MONOCYTES NFR BLD: 6.9 % (ref 4–15)
MONOCYTES NFR BLD: 7 % (ref 4–15)
MONOCYTES NFR BLD: 7.3 % (ref 4–15)
MONOCYTES NFR BLD: 8.4 % (ref 4–15)
MONOCYTES NFR BLD: 8.5 % (ref 4–15)
MV PEAK A VEL: 0.88 M/S
MV PEAK E VEL: 0.72 M/S
MV STENOSIS PRESSURE HALF TIME: 70.72 MS
MV VALVE AREA P 1/2 METHOD: 3.11 CM2
NEUTROPHILS # BLD AUTO: 10.9 K/UL (ref 1.8–7.7)
NEUTROPHILS # BLD AUTO: 6.2 K/UL (ref 1.8–7.7)
NEUTROPHILS # BLD AUTO: 7.6 K/UL (ref 1.8–7.7)
NEUTROPHILS # BLD AUTO: 7.7 K/UL (ref 1.8–7.7)
NEUTROPHILS # BLD AUTO: 8.4 K/UL (ref 1.8–7.7)
NEUTROPHILS # BLD AUTO: 9.6 K/UL (ref 1.8–7.7)
NEUTROPHILS NFR BLD: 76 % (ref 38–73)
NEUTROPHILS NFR BLD: 77.2 % (ref 38–73)
NEUTROPHILS NFR BLD: 77.3 % (ref 38–73)
NEUTROPHILS NFR BLD: 81.8 % (ref 38–73)
NEUTROPHILS NFR BLD: 82.9 % (ref 38–73)
NEUTROPHILS NFR BLD: 85.6 % (ref 38–73)
NITRITE UR QL STRIP: NEGATIVE
NRBC BLD-RTO: 0 /100 WBC
NUM UNITS TRANS PACKED RBC: NORMAL
NUM UNITS TRANS PACKED RBC: NORMAL
OB PNL STL: POSITIVE
PH UR STRIP: 6 [PH] (ref 5–8)
PHOSPHATE SERPL-MCNC: 2.7 MG/DL (ref 2.7–4.5)
PISA AR MAX VEL: 2.51 M/S
PISA TR MAX VEL: 2.09 M/S
PLATELET # BLD AUTO: 131 K/UL (ref 150–450)
PLATELET # BLD AUTO: 147 K/UL (ref 150–450)
PLATELET # BLD AUTO: 147 K/UL (ref 150–450)
PLATELET # BLD AUTO: 159 K/UL (ref 150–450)
PLATELET # BLD AUTO: 183 K/UL (ref 150–450)
PLATELET # BLD AUTO: 194 K/UL (ref 150–450)
PMV BLD AUTO: 10.4 FL (ref 9.2–12.9)
PMV BLD AUTO: 10.4 FL (ref 9.2–12.9)
PMV BLD AUTO: 10.5 FL (ref 9.2–12.9)
PMV BLD AUTO: 10.7 FL (ref 9.2–12.9)
PMV BLD AUTO: 10.8 FL (ref 9.2–12.9)
PMV BLD AUTO: 10.9 FL (ref 9.2–12.9)
POC MOLECULAR INFLUENZA A AGN: NEGATIVE
POC MOLECULAR INFLUENZA B AGN: NEGATIVE
POCT GLUCOSE: 100 MG/DL (ref 70–110)
POCT GLUCOSE: 102 MG/DL (ref 70–110)
POCT GLUCOSE: 108 MG/DL (ref 70–110)
POCT GLUCOSE: 111 MG/DL (ref 70–110)
POCT GLUCOSE: 115 MG/DL (ref 70–110)
POCT GLUCOSE: 116 MG/DL (ref 70–110)
POCT GLUCOSE: 124 MG/DL (ref 70–110)
POCT GLUCOSE: 131 MG/DL (ref 70–110)
POCT GLUCOSE: 144 MG/DL (ref 70–110)
POCT GLUCOSE: 72 MG/DL (ref 70–110)
POCT GLUCOSE: 80 MG/DL (ref 70–110)
POCT GLUCOSE: 81 MG/DL (ref 70–110)
POCT GLUCOSE: 82 MG/DL (ref 70–110)
POCT GLUCOSE: 84 MG/DL (ref 70–110)
POCT GLUCOSE: 84 MG/DL (ref 70–110)
POCT GLUCOSE: 85 MG/DL (ref 70–110)
POCT GLUCOSE: 99 MG/DL (ref 70–110)
POTASSIUM SERPL-SCNC: 3.8 MMOL/L (ref 3.5–5.1)
POTASSIUM SERPL-SCNC: 4.2 MMOL/L (ref 3.5–5.1)
POTASSIUM SERPL-SCNC: 4.6 MMOL/L (ref 3.5–5.1)
PROCALCITONIN SERPL IA-MCNC: 0.08 NG/ML
PROCALCITONIN SERPL IA-MCNC: 0.14 NG/ML
PROT SERPL-MCNC: 5.8 G/DL (ref 6–8.4)
PROT UR QL STRIP: NEGATIVE
PROTHROMBIN TIME: 10.9 SEC (ref 9–12.5)
PV MEAN GRADIENT: 1.98 MMHG
RA MAJOR: 4.19 CM
RA WIDTH: 3 CM
RBC # BLD AUTO: 2.56 M/UL (ref 4.6–6.2)
RBC # BLD AUTO: 2.59 M/UL (ref 4.6–6.2)
RBC # BLD AUTO: 2.76 M/UL (ref 4.6–6.2)
RBC # BLD AUTO: 3.14 M/UL (ref 4.6–6.2)
RBC # BLD AUTO: 3.28 M/UL (ref 4.6–6.2)
RBC # BLD AUTO: 4.04 M/UL (ref 4.6–6.2)
RBC #/AREA URNS AUTO: 3 /HPF (ref 0–4)
SODIUM SERPL-SCNC: 135 MMOL/L (ref 136–145)
SODIUM SERPL-SCNC: 136 MMOL/L (ref 136–145)
SODIUM SERPL-SCNC: 136 MMOL/L (ref 136–145)
SODIUM SERPL-SCNC: 138 MMOL/L (ref 136–145)
SODIUM SERPL-SCNC: 140 MMOL/L (ref 136–145)
SP GR UR STRIP: 1 (ref 1–1.03)
STJ: 3.54 CM
TDI LATERAL: 0.06 M/S
TDI SEPTAL: 0.07 M/S
TDI: 0.07 M/S
TR MAX PG: 17 MMHG
TR MEAN GRADIENT: 23 MMHG
TRICUSPID ANNULAR PLANE SYSTOLIC EXCURSION: 1.8 CM
TROPONIN I SERPL DL<=0.01 NG/ML-MCNC: 0.01 NG/ML (ref 0–0.03)
URN SPEC COLLECT METH UR: ABNORMAL
UROBILINOGEN UR STRIP-ACNC: NEGATIVE EU/DL
WBC # BLD AUTO: 10 K/UL (ref 3.9–12.7)
WBC # BLD AUTO: 10.06 K/UL (ref 3.9–12.7)
WBC # BLD AUTO: 10.13 K/UL (ref 3.9–12.7)
WBC # BLD AUTO: 11.75 K/UL (ref 3.9–12.7)
WBC # BLD AUTO: 12.7 K/UL (ref 3.9–12.7)
WBC # BLD AUTO: 7.99 K/UL (ref 3.9–12.7)
WBC #/AREA URNS AUTO: 1 /HPF (ref 0–5)

## 2022-01-01 PROCEDURE — 80048 BASIC METABOLIC PNL TOTAL CA: CPT | Performed by: INTERNAL MEDICINE

## 2022-01-01 PROCEDURE — 76870 US EXAM SCROTUM: CPT | Mod: 26,,, | Performed by: RADIOLOGY

## 2022-01-01 PROCEDURE — 25000242 PHARM REV CODE 250 ALT 637 W/ HCPCS: Performed by: INTERNAL MEDICINE

## 2022-01-01 PROCEDURE — 94761 N-INVAS EAR/PLS OXIMETRY MLT: CPT

## 2022-01-01 PROCEDURE — 21400001 HC TELEMETRY ROOM

## 2022-01-01 PROCEDURE — 85025 COMPLETE CBC W/AUTO DIFF WBC: CPT | Performed by: INTERNAL MEDICINE

## 2022-01-01 PROCEDURE — 36415 COLL VENOUS BLD VENIPUNCTURE: CPT | Performed by: FAMILY MEDICINE

## 2022-01-01 PROCEDURE — 25000003 PHARM REV CODE 250: Performed by: INTERNAL MEDICINE

## 2022-01-01 PROCEDURE — 25000003 PHARM REV CODE 250: Performed by: EMERGENCY MEDICINE

## 2022-01-01 PROCEDURE — 94640 AIRWAY INHALATION TREATMENT: CPT

## 2022-01-01 PROCEDURE — P9016 RBC LEUKOCYTES REDUCED: HCPCS | Performed by: INTERNAL MEDICINE

## 2022-01-01 PROCEDURE — 25000003 PHARM REV CODE 250: Performed by: STUDENT IN AN ORGANIZED HEALTH CARE EDUCATION/TRAINING PROGRAM

## 2022-01-01 PROCEDURE — 76870 US SCROTUM AND TESTICLES: ICD-10-PCS | Mod: 26,,, | Performed by: RADIOLOGY

## 2022-01-01 PROCEDURE — 83880 ASSAY OF NATRIURETIC PEPTIDE: CPT | Mod: ER | Performed by: EMERGENCY MEDICINE

## 2022-01-01 PROCEDURE — 83605 ASSAY OF LACTIC ACID: CPT | Mod: ER | Performed by: EMERGENCY MEDICINE

## 2022-01-01 PROCEDURE — 27000221 HC OXYGEN, UP TO 24 HOURS

## 2022-01-01 PROCEDURE — 84484 ASSAY OF TROPONIN QUANT: CPT | Mod: ER | Performed by: EMERGENCY MEDICINE

## 2022-01-01 PROCEDURE — 27200997: Performed by: INTERNAL MEDICINE

## 2022-01-01 PROCEDURE — 99999 PR PBB SHADOW E&M-EST. PATIENT-LVL V: CPT | Mod: PBBFAC,,, | Performed by: UROLOGY

## 2022-01-01 PROCEDURE — 84145 PROCALCITONIN (PCT): CPT | Mod: ER | Performed by: EMERGENCY MEDICINE

## 2022-01-01 PROCEDURE — 99231 SBSQ HOSP IP/OBS SF/LOW 25: CPT | Mod: ,,, | Performed by: PHYSICIAN ASSISTANT

## 2022-01-01 PROCEDURE — 36415 COLL VENOUS BLD VENIPUNCTURE: CPT | Performed by: INTERNAL MEDICINE

## 2022-01-01 PROCEDURE — C9113 INJ PANTOPRAZOLE SODIUM, VIA: HCPCS | Performed by: INTERNAL MEDICINE

## 2022-01-01 PROCEDURE — 85014 HEMATOCRIT: CPT | Performed by: FAMILY MEDICINE

## 2022-01-01 PROCEDURE — 99215 OFFICE O/P EST HI 40 MIN: CPT | Mod: PBBFAC | Performed by: UROLOGY

## 2022-01-01 PROCEDURE — 99291 CRITICAL CARE FIRST HOUR: CPT | Mod: 25

## 2022-01-01 PROCEDURE — 63600175 PHARM REV CODE 636 W HCPCS: Performed by: INTERNAL MEDICINE

## 2022-01-01 PROCEDURE — 81000 URINALYSIS NONAUTO W/SCOPE: CPT | Mod: ER | Performed by: EMERGENCY MEDICINE

## 2022-01-01 PROCEDURE — 99223 1ST HOSP IP/OBS HIGH 75: CPT | Mod: 25,,, | Performed by: INTERNAL MEDICINE

## 2022-01-01 PROCEDURE — 25000003 PHARM REV CODE 250: Mod: ER | Performed by: EMERGENCY MEDICINE

## 2022-01-01 PROCEDURE — 36556 INSERT NON-TUNNEL CV CATH: CPT

## 2022-01-01 PROCEDURE — 83690 ASSAY OF LIPASE: CPT | Mod: ER | Performed by: EMERGENCY MEDICINE

## 2022-01-01 PROCEDURE — 20000000 HC ICU ROOM

## 2022-01-01 PROCEDURE — 99900035 HC TECH TIME PER 15 MIN (STAT)

## 2022-01-01 PROCEDURE — 27201028 HC NEEDLE, SCLERO: Performed by: INTERNAL MEDICINE

## 2022-01-01 PROCEDURE — 37000009 HC ANESTHESIA EA ADD 15 MINS: Performed by: INTERNAL MEDICINE

## 2022-01-01 PROCEDURE — 99214 PR OFFICE/OUTPT VISIT, EST, LEVL IV, 30-39 MIN: ICD-10-PCS | Mod: S$PBB,,, | Performed by: UROLOGY

## 2022-01-01 PROCEDURE — 85014 HEMATOCRIT: CPT | Mod: 91 | Performed by: FAMILY MEDICINE

## 2022-01-01 PROCEDURE — 85025 COMPLETE CBC W/AUTO DIFF WBC: CPT | Mod: ER | Performed by: EMERGENCY MEDICINE

## 2022-01-01 PROCEDURE — 99231 PR SUBSEQUENT HOSPITAL CARE,LEVL I: ICD-10-PCS | Mod: ,,, | Performed by: PHYSICIAN ASSISTANT

## 2022-01-01 PROCEDURE — 99999 PR PBB SHADOW E&M-EST. PATIENT-LVL V: ICD-10-PCS | Mod: PBBFAC,,, | Performed by: UROLOGY

## 2022-01-01 PROCEDURE — 36415 COLL VENOUS BLD VENIPUNCTURE: CPT | Performed by: STUDENT IN AN ORGANIZED HEALTH CARE EDUCATION/TRAINING PROGRAM

## 2022-01-01 PROCEDURE — 85018 HEMOGLOBIN: CPT | Performed by: FAMILY MEDICINE

## 2022-01-01 PROCEDURE — 86901 BLOOD TYPING SEROLOGIC RH(D): CPT | Performed by: INTERNAL MEDICINE

## 2022-01-01 PROCEDURE — 83735 ASSAY OF MAGNESIUM: CPT | Mod: ER | Performed by: EMERGENCY MEDICINE

## 2022-01-01 PROCEDURE — 36430 TRANSFUSION BLD/BLD COMPNT: CPT

## 2022-01-01 PROCEDURE — 99223 PR INITIAL HOSPITAL CARE,LEVL III: ICD-10-PCS | Mod: 25,,, | Performed by: INTERNAL MEDICINE

## 2022-01-01 PROCEDURE — 25000003 PHARM REV CODE 250: Mod: ER | Performed by: NURSE PRACTITIONER

## 2022-01-01 PROCEDURE — 96365 THER/PROPH/DIAG IV INF INIT: CPT

## 2022-01-01 PROCEDURE — 85025 COMPLETE CBC W/AUTO DIFF WBC: CPT | Mod: 91 | Performed by: INTERNAL MEDICINE

## 2022-01-01 PROCEDURE — 43255 EGD CONTROL BLEEDING ANY: CPT | Performed by: INTERNAL MEDICINE

## 2022-01-01 PROCEDURE — 63600175 PHARM REV CODE 636 W HCPCS: Performed by: STUDENT IN AN ORGANIZED HEALTH CARE EDUCATION/TRAINING PROGRAM

## 2022-01-01 PROCEDURE — 76870 US EXAM SCROTUM: CPT | Mod: TC,PO

## 2022-01-01 PROCEDURE — 87502 INFLUENZA DNA AMP PROBE: CPT

## 2022-01-01 PROCEDURE — 37000008 HC ANESTHESIA 1ST 15 MINUTES: Performed by: INTERNAL MEDICINE

## 2022-01-01 PROCEDURE — 99292 CRITICAL CARE ADDL 30 MIN: CPT

## 2022-01-01 PROCEDURE — 25000242 PHARM REV CODE 250 ALT 637 W/ HCPCS: Performed by: NURSE PRACTITIONER

## 2022-01-01 PROCEDURE — 43255 EGD CONTROL BLEEDING ANY: CPT | Mod: ,,, | Performed by: INTERNAL MEDICINE

## 2022-01-01 PROCEDURE — 96361 HYDRATE IV INFUSION ADD-ON: CPT

## 2022-01-01 PROCEDURE — 25000003 PHARM REV CODE 250: Performed by: FAMILY MEDICINE

## 2022-01-01 PROCEDURE — 83036 HEMOGLOBIN GLYCOSYLATED A1C: CPT | Performed by: INTERNAL MEDICINE

## 2022-01-01 PROCEDURE — 82272 OCCULT BLD FECES 1-3 TESTS: CPT | Mod: ER | Performed by: EMERGENCY MEDICINE

## 2022-01-01 PROCEDURE — 87040 BLOOD CULTURE FOR BACTERIA: CPT | Performed by: EMERGENCY MEDICINE

## 2022-01-01 PROCEDURE — 84145 PROCALCITONIN (PCT): CPT | Performed by: INTERNAL MEDICINE

## 2022-01-01 PROCEDURE — 80053 COMPREHEN METABOLIC PANEL: CPT | Mod: ER | Performed by: EMERGENCY MEDICINE

## 2022-01-01 PROCEDURE — 99283 EMERGENCY DEPT VISIT LOW MDM: CPT | Mod: ER

## 2022-01-01 PROCEDURE — 43255 PR EGD, FLEX, W/CTRL BLEED, ANY METHOD: ICD-10-PCS | Mod: ,,, | Performed by: INTERNAL MEDICINE

## 2022-01-01 PROCEDURE — 99223 1ST HOSP IP/OBS HIGH 75: CPT | Mod: ,,, | Performed by: INTERNAL MEDICINE

## 2022-01-01 PROCEDURE — 85018 HEMOGLOBIN: CPT | Mod: 91 | Performed by: FAMILY MEDICINE

## 2022-01-01 PROCEDURE — 99223 PR INITIAL HOSPITAL CARE,LEVL III: ICD-10-PCS | Mod: ,,, | Performed by: INTERNAL MEDICINE

## 2022-01-01 PROCEDURE — 63600175 PHARM REV CODE 636 W HCPCS: Mod: ER | Performed by: EMERGENCY MEDICINE

## 2022-01-01 PROCEDURE — 84100 ASSAY OF PHOSPHORUS: CPT | Mod: ER | Performed by: EMERGENCY MEDICINE

## 2022-01-01 PROCEDURE — 86922 COMPATIBILITY TEST ANTIGLOB: CPT | Performed by: INTERNAL MEDICINE

## 2022-01-01 PROCEDURE — 85610 PROTHROMBIN TIME: CPT | Mod: ER | Performed by: EMERGENCY MEDICINE

## 2022-01-01 PROCEDURE — 99214 OFFICE O/P EST MOD 30 MIN: CPT | Mod: S$PBB,,, | Performed by: UROLOGY

## 2022-01-01 PROCEDURE — 96366 THER/PROPH/DIAG IV INF ADDON: CPT

## 2022-01-01 RX ORDER — SODIUM CHLORIDE 0.9 % (FLUSH) 0.9 %
10 SYRINGE (ML) INJECTION EVERY 12 HOURS PRN
Status: DISCONTINUED | OUTPATIENT
Start: 2022-01-01 | End: 2022-01-01 | Stop reason: HOSPADM

## 2022-01-01 RX ORDER — CLOPIDOGREL BISULFATE 75 MG/1
75 TABLET ORAL DAILY
Start: 2022-01-01

## 2022-01-01 RX ORDER — NAPROXEN SODIUM 220 MG/1
81 TABLET, FILM COATED ORAL DAILY
Refills: 0
Start: 2022-01-01 | End: 2023-01-01 | Stop reason: CLARIF

## 2022-01-01 RX ORDER — IPRATROPIUM BROMIDE 0.5 MG/2.5ML
0.5 SOLUTION RESPIRATORY (INHALATION) EVERY 6 HOURS
Status: DISCONTINUED | OUTPATIENT
Start: 2022-01-01 | End: 2022-01-01 | Stop reason: HOSPADM

## 2022-01-01 RX ORDER — HYDROCODONE BITARTRATE AND ACETAMINOPHEN 500; 5 MG/1; MG/1
TABLET ORAL
Status: DISCONTINUED | OUTPATIENT
Start: 2022-01-01 | End: 2022-01-01 | Stop reason: HOSPADM

## 2022-01-01 RX ORDER — ENOXAPARIN SODIUM 100 MG/ML
40 INJECTION SUBCUTANEOUS EVERY 12 HOURS
Status: DISCONTINUED | OUTPATIENT
Start: 2022-01-01 | End: 2022-01-01

## 2022-01-01 RX ORDER — PANTOPRAZOLE SODIUM 40 MG/1
40 TABLET, DELAYED RELEASE ORAL 2 TIMES DAILY
Qty: 120 TABLET | Refills: 0 | Status: SHIPPED | OUTPATIENT
Start: 2022-01-01 | End: 2022-01-01 | Stop reason: SDUPTHER

## 2022-01-01 RX ORDER — IBUPROFEN 200 MG
16 TABLET ORAL
Status: DISCONTINUED | OUTPATIENT
Start: 2022-01-01 | End: 2022-01-01 | Stop reason: HOSPADM

## 2022-01-01 RX ORDER — ACETAMINOPHEN 325 MG/1
650 TABLET ORAL
Status: COMPLETED | OUTPATIENT
Start: 2022-01-01 | End: 2022-01-01

## 2022-01-01 RX ORDER — POTASSIUM CHLORIDE 1500 MG/1
0.2 TABLET, EXTENDED RELEASE ORAL 2 TIMES DAILY
Status: ON HOLD | COMMUNITY
Start: 2022-05-12 | End: 2022-01-01 | Stop reason: SDUPTHER

## 2022-01-01 RX ORDER — LIDOCAINE HYDROCHLORIDE 10 MG/ML
INJECTION, SOLUTION EPIDURAL; INFILTRATION; INTRACAUDAL; PERINEURAL
Status: DISCONTINUED | OUTPATIENT
Start: 2022-01-01 | End: 2022-01-01

## 2022-01-01 RX ORDER — LANOLIN ALCOHOL/MO/W.PET/CERES
1000 CREAM (GRAM) TOPICAL DAILY
COMMUNITY
Start: 2022-04-26

## 2022-01-01 RX ORDER — MAG HYDROX/ALUMINUM HYD/SIMETH 200-200-20
30 SUSPENSION, ORAL (FINAL DOSE FORM) ORAL 4 TIMES DAILY PRN
Status: DISCONTINUED | OUTPATIENT
Start: 2022-01-01 | End: 2022-01-01 | Stop reason: HOSPADM

## 2022-01-01 RX ORDER — BUMETANIDE 1 MG/1
1 TABLET ORAL 2 TIMES DAILY
Status: ON HOLD | COMMUNITY
Start: 2022-05-12 | End: 2022-01-01 | Stop reason: SDUPTHER

## 2022-01-01 RX ORDER — ONDANSETRON 8 MG/1
8 TABLET, ORALLY DISINTEGRATING ORAL EVERY 8 HOURS PRN
Status: DISCONTINUED | OUTPATIENT
Start: 2022-01-01 | End: 2022-01-01 | Stop reason: HOSPADM

## 2022-01-01 RX ORDER — ACETAMINOPHEN 325 MG/1
650 TABLET ORAL EVERY 8 HOURS PRN
Status: DISCONTINUED | OUTPATIENT
Start: 2022-01-01 | End: 2022-01-01

## 2022-01-01 RX ORDER — GLUCAGON 1 MG
1 KIT INJECTION
Status: DISCONTINUED | OUTPATIENT
Start: 2022-01-01 | End: 2022-01-01 | Stop reason: HOSPADM

## 2022-01-01 RX ORDER — TALC
6 POWDER (GRAM) TOPICAL NIGHTLY PRN
Status: DISCONTINUED | OUTPATIENT
Start: 2022-01-01 | End: 2022-01-01 | Stop reason: HOSPADM

## 2022-01-01 RX ORDER — SODIUM CHLORIDE 0.9 % (FLUSH) 0.9 %
10 SYRINGE (ML) INJECTION
Status: DISCONTINUED | OUTPATIENT
Start: 2022-01-01 | End: 2022-01-01 | Stop reason: HOSPADM

## 2022-01-01 RX ORDER — ATORVASTATIN CALCIUM 80 MG/1
1 TABLET, FILM COATED ORAL NIGHTLY
COMMUNITY
Start: 2022-01-01

## 2022-01-01 RX ORDER — PANTOPRAZOLE SODIUM 40 MG/1
40 TABLET, DELAYED RELEASE ORAL 2 TIMES DAILY
Status: DISCONTINUED | OUTPATIENT
Start: 2022-01-01 | End: 2022-01-01 | Stop reason: HOSPADM

## 2022-01-01 RX ORDER — MUPIROCIN 20 MG/G
OINTMENT TOPICAL 2 TIMES DAILY
Status: DISCONTINUED | OUTPATIENT
Start: 2022-01-01 | End: 2022-01-01 | Stop reason: HOSPADM

## 2022-01-01 RX ORDER — PANTOPRAZOLE SODIUM 40 MG/10ML
40 INJECTION, POWDER, LYOPHILIZED, FOR SOLUTION INTRAVENOUS 2 TIMES DAILY
Status: DISCONTINUED | OUTPATIENT
Start: 2022-01-01 | End: 2022-01-01

## 2022-01-01 RX ORDER — INSULIN ASPART 100 [IU]/ML
0-5 INJECTION, SOLUTION INTRAVENOUS; SUBCUTANEOUS
Status: DISCONTINUED | OUTPATIENT
Start: 2022-01-01 | End: 2022-01-01 | Stop reason: HOSPADM

## 2022-01-01 RX ORDER — ONDANSETRON 2 MG/ML
4 INJECTION INTRAMUSCULAR; INTRAVENOUS EVERY 6 HOURS PRN
Status: DISCONTINUED | OUTPATIENT
Start: 2022-01-01 | End: 2022-01-01 | Stop reason: HOSPADM

## 2022-01-01 RX ORDER — SODIUM CHLORIDE, SODIUM LACTATE, POTASSIUM CHLORIDE, CALCIUM CHLORIDE 600; 310; 30; 20 MG/100ML; MG/100ML; MG/100ML; MG/100ML
INJECTION, SOLUTION INTRAVENOUS CONTINUOUS PRN
Status: DISCONTINUED | OUTPATIENT
Start: 2022-01-01 | End: 2022-01-01

## 2022-01-01 RX ORDER — EPINEPHRINE CONVENIENCE KIT 1 MG/ML(1)
KIT INTRAMUSCULAR; SUBCUTANEOUS
Status: COMPLETED | OUTPATIENT
Start: 2022-01-01 | End: 2022-01-01

## 2022-01-01 RX ORDER — ACETAMINOPHEN 325 MG/1
650 TABLET ORAL EVERY 8 HOURS PRN
Status: DISCONTINUED | OUTPATIENT
Start: 2022-01-01 | End: 2022-01-01 | Stop reason: HOSPADM

## 2022-01-01 RX ORDER — SUCRALFATE 1 G/10ML
1 SUSPENSION ORAL EVERY 6 HOURS
Status: DISCONTINUED | OUTPATIENT
Start: 2022-01-01 | End: 2022-01-01 | Stop reason: HOSPADM

## 2022-01-01 RX ORDER — BUMETANIDE 1 MG/1
1 TABLET ORAL 2 TIMES DAILY
Start: 2022-01-01

## 2022-01-01 RX ORDER — NOREPINEPHRINE BITARTRATE/D5W 4MG/250ML
0-3 PLASTIC BAG, INJECTION (ML) INTRAVENOUS CONTINUOUS
Status: DISCONTINUED | OUTPATIENT
Start: 2022-01-01 | End: 2022-01-01

## 2022-01-01 RX ORDER — POTASSIUM CHLORIDE 1500 MG/1
20 TABLET, EXTENDED RELEASE ORAL 2 TIMES DAILY
Start: 2022-01-01

## 2022-01-01 RX ORDER — METFORMIN HYDROCHLORIDE 500 MG/1
500 TABLET, EXTENDED RELEASE ORAL EVERY MORNING
COMMUNITY
Start: 2022-01-01

## 2022-01-01 RX ORDER — SUCRALFATE 1 G/10ML
1 SUSPENSION ORAL EVERY 6 HOURS
Qty: 280 ML | Refills: 0 | Status: SHIPPED | OUTPATIENT
Start: 2022-01-01 | End: 2022-01-01 | Stop reason: SDUPTHER

## 2022-01-01 RX ORDER — SUCRALFATE 1 G/10ML
1 SUSPENSION ORAL EVERY 6 HOURS
Qty: 280 ML | Refills: 0 | Status: SHIPPED | OUTPATIENT
Start: 2022-01-01 | End: 2022-01-01

## 2022-01-01 RX ORDER — IPRATROPIUM BROMIDE AND ALBUTEROL SULFATE 2.5; .5 MG/3ML; MG/3ML
3 SOLUTION RESPIRATORY (INHALATION) EVERY 6 HOURS PRN
Status: DISCONTINUED | OUTPATIENT
Start: 2022-01-01 | End: 2022-01-01 | Stop reason: HOSPADM

## 2022-01-01 RX ORDER — IBUPROFEN 200 MG
24 TABLET ORAL
Status: DISCONTINUED | OUTPATIENT
Start: 2022-01-01 | End: 2022-01-01 | Stop reason: HOSPADM

## 2022-01-01 RX ORDER — SIMETHICONE 80 MG
1 TABLET,CHEWABLE ORAL 4 TIMES DAILY PRN
Status: DISCONTINUED | OUTPATIENT
Start: 2022-01-01 | End: 2022-01-01 | Stop reason: HOSPADM

## 2022-01-01 RX ORDER — PANTOPRAZOLE SODIUM 40 MG/1
40 TABLET, DELAYED RELEASE ORAL 2 TIMES DAILY
Qty: 120 TABLET | Refills: 0 | Status: SHIPPED | OUTPATIENT
Start: 2022-01-01 | End: 2023-01-01

## 2022-01-01 RX ORDER — NALOXONE HYDROCHLORIDE 1 MG/ML
0.02 INJECTION INTRAMUSCULAR; INTRAVENOUS; SUBCUTANEOUS
Status: DISCONTINUED | OUTPATIENT
Start: 2022-01-01 | End: 2022-01-01 | Stop reason: HOSPADM

## 2022-01-01 RX ORDER — METOPROLOL SUCCINATE 25 MG/1
25 TABLET, EXTENDED RELEASE ORAL DAILY
Qty: 90 TABLET | Refills: 3 | Status: SHIPPED | OUTPATIENT
Start: 2022-01-01 | End: 2023-11-22

## 2022-01-01 RX ORDER — PROPOFOL 10 MG/ML
VIAL (ML) INTRAVENOUS
Status: DISCONTINUED | OUTPATIENT
Start: 2022-01-01 | End: 2022-01-01

## 2022-01-01 RX ADMIN — IPRATROPIUM BROMIDE 0.5 MG: 0.5 SOLUTION RESPIRATORY (INHALATION) at 08:11

## 2022-01-01 RX ADMIN — SUCRALFATE 1 G: 1 SUSPENSION ORAL at 06:11

## 2022-01-01 RX ADMIN — SUCRALFATE 1 G: 1 SUSPENSION ORAL at 05:11

## 2022-01-01 RX ADMIN — MUPIROCIN: 20 OINTMENT TOPICAL at 08:11

## 2022-01-01 RX ADMIN — MUPIROCIN: 20 OINTMENT TOPICAL at 09:11

## 2022-01-01 RX ADMIN — IPRATROPIUM BROMIDE 0.5 MG: 0.5 SOLUTION RESPIRATORY (INHALATION) at 01:11

## 2022-01-01 RX ADMIN — SODIUM CHLORIDE, SODIUM LACTATE, POTASSIUM CHLORIDE, AND CALCIUM CHLORIDE 2754 ML: .6; .31; .03; .02 INJECTION, SOLUTION INTRAVENOUS at 09:11

## 2022-01-01 RX ADMIN — Medication 0.1 MCG/KG/MIN: at 03:11

## 2022-01-01 RX ADMIN — IPRATROPIUM BROMIDE 0.5 MG: 0.5 SOLUTION RESPIRATORY (INHALATION) at 02:11

## 2022-01-01 RX ADMIN — ACETAMINOPHEN 650 MG: 325 TABLET ORAL at 03:08

## 2022-01-01 RX ADMIN — IPRATROPIUM BROMIDE 0.5 MG: 0.5 SOLUTION RESPIRATORY (INHALATION) at 07:11

## 2022-01-01 RX ADMIN — IPRATROPIUM BROMIDE AND ALBUTEROL SULFATE 3 ML: 2.5; .5 SOLUTION RESPIRATORY (INHALATION) at 07:11

## 2022-01-01 RX ADMIN — PANTOPRAZOLE SODIUM 40 MG: 40 TABLET, DELAYED RELEASE ORAL at 09:11

## 2022-01-01 RX ADMIN — SODIUM CHLORIDE, SODIUM LACTATE, POTASSIUM CHLORIDE, AND CALCIUM CHLORIDE: 600; 310; 30; 20 INJECTION, SOLUTION INTRAVENOUS at 10:11

## 2022-01-01 RX ADMIN — PANTOPRAZOLE SODIUM 40 MG: 40 INJECTION, POWDER, FOR SOLUTION INTRAVENOUS at 08:11

## 2022-01-01 RX ADMIN — PROPOFOL 20 MG: 10 INJECTION, EMULSION INTRAVENOUS at 10:11

## 2022-01-01 RX ADMIN — MELATONIN TAB 3 MG 6 MG: 3 TAB at 08:11

## 2022-01-01 RX ADMIN — SUCRALFATE 1 G: 1 SUSPENSION ORAL at 11:11

## 2022-01-01 RX ADMIN — PANTOPRAZOLE SODIUM 40 MG: 40 TABLET, DELAYED RELEASE ORAL at 08:11

## 2022-01-01 RX ADMIN — SUCRALFATE 1 G: 1 SUSPENSION ORAL at 12:11

## 2022-01-01 RX ADMIN — PROPOFOL 10 MG: 10 INJECTION, EMULSION INTRAVENOUS at 10:11

## 2022-01-01 RX ADMIN — LIDOCAINE HYDROCHLORIDE 100 MG: 10 INJECTION, SOLUTION EPIDURAL; INFILTRATION; INTRACAUDAL; PERINEURAL at 10:11

## 2022-01-01 RX ADMIN — MELATONIN TAB 3 MG 6 MG: 3 TAB at 09:11

## 2022-01-01 RX ADMIN — IPRATROPIUM BROMIDE 0.5 MG: 0.5 SOLUTION RESPIRATORY (INHALATION) at 12:11

## 2022-01-01 RX ADMIN — PANTOPRAZOLE SODIUM 40 MG: 40 INJECTION, POWDER, FOR SOLUTION INTRAVENOUS at 04:11

## 2022-01-01 RX ADMIN — PROPOFOL 50 MG: 10 INJECTION, EMULSION INTRAVENOUS at 10:11

## 2022-01-01 RX ADMIN — ACETAMINOPHEN 650 MG: 325 TABLET ORAL at 05:11

## 2022-01-01 RX ADMIN — PANTOPRAZOLE SODIUM 40 MG: 40 INJECTION, POWDER, FOR SOLUTION INTRAVENOUS at 09:11

## 2022-01-01 RX ADMIN — ACETAMINOPHEN 650 MG: 325 TABLET ORAL at 12:11

## 2022-01-01 RX ADMIN — MELATONIN TAB 3 MG 6 MG: 3 TAB at 01:11

## 2022-01-11 ENCOUNTER — OFFICE VISIT (OUTPATIENT)
Dept: UROLOGY | Facility: CLINIC | Age: 72
End: 2022-01-11
Payer: OTHER GOVERNMENT

## 2022-01-11 VITALS — HEIGHT: 67 IN | TEMPERATURE: 98 F | BODY MASS INDEX: 43.25 KG/M2 | WEIGHT: 275.56 LBS

## 2022-01-11 DIAGNOSIS — N43.3 HYDROCELE, UNSPECIFIED HYDROCELE TYPE: Primary | ICD-10-CM

## 2022-01-11 PROCEDURE — 99999 PR PBB SHADOW E&M-EST. PATIENT-LVL IV: ICD-10-PCS | Mod: PBBFAC,,, | Performed by: UROLOGY

## 2022-01-11 PROCEDURE — 99213 OFFICE O/P EST LOW 20 MIN: CPT | Mod: S$PBB,,, | Performed by: UROLOGY

## 2022-01-11 PROCEDURE — 99213 PR OFFICE/OUTPT VISIT, EST, LEVL III, 20-29 MIN: ICD-10-PCS | Mod: S$PBB,,, | Performed by: UROLOGY

## 2022-01-11 PROCEDURE — 99214 OFFICE O/P EST MOD 30 MIN: CPT | Mod: PBBFAC | Performed by: UROLOGY

## 2022-01-11 PROCEDURE — 99999 PR PBB SHADOW E&M-EST. PATIENT-LVL IV: CPT | Mod: PBBFAC,,, | Performed by: UROLOGY

## 2022-01-11 NOTE — PROGRESS NOTES
Chief Complaint:  Scrotal swelling    HPI:   01/11/2022 - patient returns today for follow-up, swelling has remained about the same since his percutaneous drainage, denies any pain, states that his heart doctor said that he could have surgery, denies any voiding issues, got his flu and covid booster    11/23/2021 - patient returns today for follow-up, he underwent percutaneous hydrocele drainage in the clinic 10/28, notes that though he does continue to have swelling, he notes his symptoms are much better, he was unfortunately unable to find a jockstrap in his size, but was told to by some boxer briefs, denies any voiding issues, denies gross hematuria    10/19/2021 - 71 y.o. male with past medical history congestive heart failure that presents as a referral from the ED for scrotal swelling.  Patient has been to the ED twice in the last month with scrotal swelling.  Patient states that the swelling is chronic in nature but has become very uncomfortable in the last 5 months.  He states that he was seen by another urologist but that nothing ever came about.  He denies any prior urologic procedures, denies voiding issues, though he has to sit to void due to his large hydroceles.  In the ED, scrotal ultrasounds note large bilateral hydroceles.  He currently takes Plavix and aspirin for coronary artery disease.  He does not wear any supportive a jock straps or underwear.  Not currently on any medications for his prostate.  No recent PSAs.    PMH:  Past Medical History:   Diagnosis Date    AICD generator infection     Anemia     Anticoagulant long-term use     Arthritis     CAD (coronary artery disease)     CHF (congestive heart failure)     Chronic pain     CKD (chronic kidney disease) stage 3, GFR 30-59 ml/min     COPD (chronic obstructive pulmonary disease)     Diabetes mellitus     Erectile dysfunction     Hyperlipemia     Hypertension     Neuropathy     Vitamin deficiency        PSH:  Past Surgical  History:   Procedure Laterality Date    CARDIAC SURGERY      CARPAL TUNNEL RELEASE      CORONARY ANGIOPLASTY WITH STENT PLACEMENT         Family History:  Family History   Problem Relation Age of Onset    Heart disease Mother     Cancer Father        Social History:  Social History     Tobacco Use    Smoking status: Current Every Day Smoker     Packs/day: 1.00     Types: Cigarettes    Smokeless tobacco: Never Used   Substance Use Topics    Alcohol use: Yes     Comment: very little    Drug use: No        Review of Systems:  General: No fever, chills  Skin: No rashes  Chest:  Denies cough and sputum production  Heart: Denies chest pain  Resp: Denies dyspnea  Abdomen: Denies diarrhea, abdominal pain, hematemesis, or blood in stool.  Musculoskeletal:  Ambulates with a walker  : see HPI  Neuro: no dizziness or weakness    Allergies:  Asa [aspirin]    Medications:    Current Outpatient Medications:     acetaminophen (TYLENOL) 500 MG tablet, Take 500 mg by mouth 3 (three) times daily., Disp: , Rfl:     albuterol (PROVENTIL) 5 mg/mL nebulizer solution, Take by nebulization every 6 (six) hours as needed for Wheezing. Rescue, Disp: , Rfl:     albuterol (PROVENTIL/VENTOLIN HFA) 90 mcg/actuation inhaler, Inhale 2 puffs into the lungs every 6 (six) hours as needed., Disp: , Rfl:     aspirin 81 MG Chew, Take 81 mg by mouth once daily., Disp: , Rfl:     budesonide-formoterol 160-4.5 mcg (SYMBICORT) 160-4.5 mcg/actuation HFAA, Inhale 2 puffs into the lungs every 12 (twelve) hours. Controller, Disp: , Rfl:     bumetanide (BUMEX) 1 MG tablet, Take 1 tablet (1 mg total) by mouth 2 (two) times daily., Disp: 180 tablet, Rfl: 0    clopidogreL (PLAVIX) 75 mg tablet, Take 75 mg by mouth once daily., Disp: , Rfl:     cyanocobalamin (VITAMIN B-12) 1000 MCG tablet, Take 100 mcg by mouth once daily., Disp: , Rfl:     ergocalciferol (ERGOCALCIFEROL) 50,000 unit Cap, Take 50,000 Units by mouth every 7 days., Disp: , Rfl:      escitalopram oxalate (LEXAPRO) 5 MG Tab, Take 5 mg by mouth nightly., Disp: , Rfl:     ferrous sulfate 325 (65 FE) MG EC tablet, Take 325 mg by mouth every 48 hours., Disp: , Rfl:     lidocaine (LIDODERM) 5 %, Place 1 patch onto the skin once daily. Remove & Discard patch within 12 hours or as directed by MD, Disp: , Rfl:     metFORMIN (GLUMETZA) 500 MG (MOD) 24 hr tablet, Take 500 mg by mouth daily with breakfast., Disp: , Rfl:     metoprolol succinate (TOPROL-XL) 25 MG 24 hr tablet, Take 1 tablet (25 mg total) by mouth once daily., Disp: 90 tablet, Rfl: 3    sacubitriL-valsartan (ENTRESTO) 24-26 mg per tablet, Take 1 tablet by mouth., Disp: , Rfl:     tiotropium (SPIRIVA) 18 mcg inhalation capsule, Inhale 18 mcg into the lungs once daily. Controller, Disp: , Rfl:     metOLazone (ZAROXOLYN) 5 MG tablet, Take 1 tablet (5 mg total) by mouth daily as needed (scrotal swelling)., Disp: 5 tablet, Rfl: 0    Physical Exam:  Vitals:    01/11/22 0940   Temp: 97.6 °F (36.4 °C)     Body mass index is 43.16 kg/m².  General: awake, alert, cooperative  Head: NC/AT  Ears: external ears normal  Eyes: sclera normal  Lungs: normal inspiration, NAD  Heart: well-perfused  Abdomen: Soft, NT, ND   10/19/2021: phallus buried due to large scrotum, large BL hydroceles present  Skin: The skin is warm and dry  Ext: No c/c/e.  Neuro: grossly intact, AOx3    RADIOLOGY:  I personally reviewed scrotal ultrasound from 10/04 and agree with the report below  US SCROTUM AND TESTICLES 10/04/2021     CLINICAL HISTORY:  Edema, unspecified     TECHNIQUE:  Sonography of the scrotum and testes.     COMPARISON:  None.     FINDINGS:  Right Testicle:  *Size: 4.7 x 2.5 x 2.9 cm cm  *Appearance: Normal.  *Flow: Normal arterial and venous flow  *Epididymis: Normal.  *Hydrocele: Large hydrocele measuring 9.9 x 7.4 x 10.1 cm.  *Varicocele: None.     Left Testicle:  *Size: 4.2 x 1.9 x 2.6 cm  *Appearance: Normal.  *Flow: Normal arterial and venous  flow  *Epididymis: Normal.  *Hydrocele: Large hydrocele measuring 8.8 x 4.8 x 7.2 cm  *Varicocele: None.     Other findings: None.     Impression:  Large bilateral hydroceles.  No evidence of testicular torsion.    LABS:  I personally reviewed the following lab values:  Lab Results   Component Value Date    WBC 9.09 10/04/2021    HGB 11.7 (L) 10/04/2021    HCT 40.6 10/04/2021     10/04/2021     10/04/2021    K 3.8 10/04/2021    CL 97 10/04/2021    CREATININE 1.5 (H) 10/04/2021    BUN 11 10/04/2021    CO2 28 10/04/2021    INR 1.0 02/19/2021    HGBA1C 6.5 (H) 02/20/2021    CHOL 142 02/20/2021    TRIG 62 02/20/2021    HDL 36 (L) 02/20/2021    ALT 7 (L) 10/04/2021    AST 9 (L) 10/04/2021     Assessment/Plan:   Fransisco Durand is a 71 y.o. male with bilateral large hydroceles, has had a good result with perc drainage and symptoms are much improved. Patient is a poor surgical candidate for an elective procedure, especially since he has had a somewhat durable result with an office procedure. Would strongly recommend PRN drainage in the office. F/u 3 months    Thank you for allowing me the opportunity to participate in this patient's care.     Michael Fernandez MD  Urology

## 2022-08-12 NOTE — ED PROVIDER NOTES
"Encounter Date: 8/12/2022       History     Chief Complaint   Patient presents with    Leg Injury     Pt says on Wednesday his back felt like it gave out causing him to fall. Pain in left leg from knee extending distally since fall. Pt walks with cane normally.      Patient presents to ER for left knee pain, onset 2 days ago after experiencing a fall.  Patient states he was attempting to sit in a chair and fell forward, hitting left knee on concrete.  He has tried previously prescribed "pain patch" and warm soaks with mild relief.  He denies associated symptoms.  Pain is worse with movement in certain positions.  He denies numbness, tingling, joint immobility, joint instability, joint erythema, open wound.    The history is provided by the patient.     Review of patient's allergies indicates:   Allergen Reactions    Asa [aspirin]      Pt states "uncoated" ASA     Past Medical History:   Diagnosis Date    AICD generator infection     Anemia     Anticoagulant long-term use     Arthritis     CAD (coronary artery disease)     CHF (congestive heart failure)     Chronic pain     CKD (chronic kidney disease) stage 3, GFR 30-59 ml/min     COPD (chronic obstructive pulmonary disease)     Diabetes mellitus     Erectile dysfunction     Hyperlipemia     Hypertension     Neuropathy     Vitamin deficiency      Past Surgical History:   Procedure Laterality Date    CARDIAC SURGERY      CARPAL TUNNEL RELEASE      CORONARY ANGIOPLASTY WITH STENT PLACEMENT       Family History   Problem Relation Age of Onset    Heart disease Mother     Cancer Father      Social History     Tobacco Use    Smoking status: Current Every Day Smoker     Packs/day: 1.00     Types: Cigarettes    Smokeless tobacco: Never Used   Substance Use Topics    Alcohol use: Yes     Comment: very little    Drug use: No     Review of Systems   Constitutional: Negative for chills, fatigue and fever.   HENT: Negative for congestion, rhinorrhea, " sinus pain and sore throat.    Eyes: Negative for pain.   Respiratory: Negative for cough and shortness of breath.    Cardiovascular: Negative for chest pain and palpitations.   Gastrointestinal: Negative for abdominal pain, nausea and vomiting.   Genitourinary: Negative for dysuria.   Musculoskeletal: Negative for back pain and neck pain.        + Left knee pain   Skin: Negative for rash and wound.   Neurological: Negative for weakness, numbness and headaches.       Physical Exam     Initial Vitals [08/12/22 1418]   BP Pulse Resp Temp SpO2   134/60 81 16 97.9 °F (36.6 °C) 96 %      MAP       --         Physical Exam    Nursing note and vitals reviewed.  Constitutional: He appears well-developed and well-nourished. He is not diaphoretic. No distress.   HENT:   Head: Normocephalic and atraumatic.   Eyes: EOM are normal. Pupils are equal, round, and reactive to light.   Neck: Neck supple.   Normal range of motion.  Cardiovascular: Normal rate, regular rhythm and intact distal pulses.   Pulmonary/Chest: Breath sounds normal. No respiratory distress.   Abdominal: Abdomen is soft. There is no abdominal tenderness.   Musculoskeletal:         General: Normal range of motion.      Cervical back: Normal range of motion and neck supple.      Right knee: Normal.      Left knee: No swelling, deformity or erythema. Normal range of motion. Tenderness present.     Neurological: He is alert and oriented to person, place, and time. He has normal strength. No sensory deficit. GCS score is 15. GCS eye subscore is 4. GCS verbal subscore is 5. GCS motor subscore is 6.   Skin: Skin is warm and dry. Capillary refill takes less than 2 seconds.         ED Course   Procedures  Labs Reviewed - No data to display       Imaging Results          X-Ray Knee Complete 4 or More Views Left (Final result)  Result time 08/12/22 14:46:41    Final result by LOIS Amaya Sr., MD (08/12/22 14:46:41)                 Impression:      1. No fracture or  dislocation  2. There is persistent mild narrowing of the joint space of the medial compartment of the left knee.  3. There is a mild amount of atherosclerosis.      Electronically signed by: Mike Amaya MD  Date:    08/12/2022  Time:    14:46             Narrative:    EXAMINATION:  XR KNEE COMP 4 OR MORE VIEWS LEFT    CLINICAL HISTORY:  Unspecified fall, initial encounter    COMPARISON:  10/03/2017    FINDINGS:  There is no fracture. There is no dislocation.  There is persistent mild narrowing of the joint space of the medial compartment of the left knee.  There is a mild amount of atherosclerosis.                                 Medications   acetaminophen tablet 650 mg (650 mg Oral Given 8/12/22 1502)                   discussed results with patient he verbalizes understanding canal questions or concerns.  Discussed these of over-the-counter Tylenol for pain relief. Discussed R.I.C.E.  Discussed follow-up with PCP.  Discussed signs and symptoms to return to ER.  Patient agrees with plan states comfortable discharge home.  I discussed with patient  that evaluation in the ED does not suggest any emergent or life threatening medical conditions requiring immediate intervention beyond what was provided in the ED, and I believe patient is safe for discharge. Regardless, an unremarkable evaluation in the ED does not preclude the development or presence of a serious of life threatening condition. As such, patient was instructed to return immediately for any worsening or change in current symptoms.         Clinical Impression:   Final diagnoses:  [W19.XXXA] Fall  [M25.562] Left knee pain, unspecified chronicity (Primary)          ED Disposition Condition    Discharge Stable        ED Prescriptions     None        Follow-up Information     Follow up With Specialties Details Why Contact Info    Follow-up with your PCP in 2 days.        [facility] Emergency Dept Emergency Medicine  As needed, If symptoms worsen [id number]  [facility] [id number] [phone]           Tree Samano, NP  08/12/22 7046

## 2022-08-17 NOTE — PROGRESS NOTES
Chief Complaint:  Scrotal swelling    HPI:   08/17/2022 - returns for follow-up, states that his cardiologist told him he's OK to have the surgery, wants it done    01/11/2022 - patient returns today for follow-up, swelling has remained about the same since his percutaneous drainage, denies any pain, states that his heart doctor said that he could have surgery, denies any voiding issues, got his flu and covid booster    11/23/2021 - patient returns today for follow-up, he underwent percutaneous hydrocele drainage in the clinic 10/28, notes that though he does continue to have swelling, he notes his symptoms are much better, he was unfortunately unable to find a jockstrap in his size, but was told to by some boxer briefs, denies any voiding issues, denies gross hematuria    10/19/2021 - 72 y.o. male with past medical history congestive heart failure that presents as a referral from the ED for scrotal swelling.  Patient has been to the ED twice in the last month with scrotal swelling.  Patient states that the swelling is chronic in nature but has become very uncomfortable in the last 5 months.  He states that he was seen by another urologist but that nothing ever came about.  He denies any prior urologic procedures, denies voiding issues, though he has to sit to void due to his large hydroceles.  In the ED, scrotal ultrasounds note large bilateral hydroceles.  He currently takes Plavix and aspirin for coronary artery disease.  He does not wear any supportive a jock straps or underwear.  Not currently on any medications for his prostate.  No recent PSAs.    PMH:  Past Medical History:   Diagnosis Date    AICD generator infection     Anemia     Anticoagulant long-term use     Arthritis     CAD (coronary artery disease)     CHF (congestive heart failure)     Chronic pain     CKD (chronic kidney disease) stage 3, GFR 30-59 ml/min     COPD (chronic obstructive pulmonary disease)     Diabetes mellitus      Erectile dysfunction     Hyperlipemia     Hypertension     Neuropathy     Vitamin deficiency        PSH:  Past Surgical History:   Procedure Laterality Date    CARDIAC SURGERY      CARPAL TUNNEL RELEASE      CORONARY ANGIOPLASTY WITH STENT PLACEMENT         Family History:  Family History   Problem Relation Age of Onset    Heart disease Mother     Cancer Father        Social History:  Social History     Tobacco Use    Smoking status: Current Every Day Smoker     Packs/day: 1.00     Types: Cigarettes    Smokeless tobacco: Never Used   Substance Use Topics    Alcohol use: Yes     Comment: very little    Drug use: No        Review of Systems:  General: No fever, chills  Skin: No rashes  Chest:  Denies cough and sputum production  Heart: Denies chest pain  Resp: Denies dyspnea  Abdomen: Denies diarrhea, abdominal pain, hematemesis, or blood in stool.  Musculoskeletal:  Ambulates with a walker  : see HPI  Neuro: no dizziness or weakness    Allergies:  Asa [aspirin]    Medications:    Current Outpatient Medications:     acetaminophen (TYLENOL) 500 MG tablet, Take 500 mg by mouth 3 (three) times daily., Disp: , Rfl:     albuterol (PROVENTIL) 5 mg/mL nebulizer solution, Take by nebulization every 6 (six) hours as needed for Wheezing. Rescue, Disp: , Rfl:     albuterol (PROVENTIL/VENTOLIN HFA) 90 mcg/actuation inhaler, Inhale 2 puffs into the lungs every 6 (six) hours as needed., Disp: , Rfl:     aspirin 81 MG Chew, Take 81 mg by mouth once daily., Disp: , Rfl:     budesonide-formoterol 160-4.5 mcg (SYMBICORT) 160-4.5 mcg/actuation HFAA, Inhale 2 puffs into the lungs every 12 (twelve) hours. Controller, Disp: , Rfl:     bumetanide (BUMEX) 1 MG tablet, Take 1 tablet (1 mg total) by mouth 2 (two) times daily., Disp: 180 tablet, Rfl: 0    clopidogreL (PLAVIX) 75 mg tablet, Take 75 mg by mouth once daily., Disp: , Rfl:     cyanocobalamin (VITAMIN B-12) 1000 MCG tablet, Take 100 mcg by mouth once daily.,  Disp: , Rfl:     ergocalciferol (ERGOCALCIFEROL) 50,000 unit Cap, Take 50,000 Units by mouth every 7 days., Disp: , Rfl:     escitalopram oxalate (LEXAPRO) 5 MG Tab, Take 5 mg by mouth nightly., Disp: , Rfl:     ferrous sulfate 325 (65 FE) MG EC tablet, Take 325 mg by mouth every 48 hours., Disp: , Rfl:     lidocaine (LIDODERM) 5 %, Place 1 patch onto the skin once daily. Remove & Discard patch within 12 hours or as directed by MD, Disp: , Rfl:     metFORMIN (GLUMETZA) 500 MG (MOD) 24 hr tablet, Take 500 mg by mouth daily with breakfast., Disp: , Rfl:     metOLazone (ZAROXOLYN) 5 MG tablet, Take 1 tablet (5 mg total) by mouth daily as needed (scrotal swelling)., Disp: 5 tablet, Rfl: 0    metoprolol succinate (TOPROL-XL) 25 MG 24 hr tablet, Take 1 tablet (25 mg total) by mouth once daily., Disp: 90 tablet, Rfl: 3    sacubitriL-valsartan (ENTRESTO) 24-26 mg per tablet, Take 1 tablet by mouth., Disp: , Rfl:     tiotropium (SPIRIVA) 18 mcg inhalation capsule, Inhale 18 mcg into the lungs once daily. Controller, Disp: , Rfl:     Physical Exam:  Vitals:    08/17/22 1055   BP: 101/67   Pulse: 78   Resp: 18     Body mass index is 42.57 kg/m².  General: awake, alert, cooperative  Head: NC/AT  Ears: external ears normal  Eyes: sclera normal  Lungs: normal inspiration, NAD  Heart: well-perfused  Abdomen: Soft, NT, ND   10/19/2021: phallus buried due to large scrotum, BL hydroceles present  Skin: The skin is warm and dry  Ext: No c/c/e.  Neuro: grossly intact, AOx3    RADIOLOGY:  I personally reviewed scrotal ultrasound from 10/04 and agree with the report below  US SCROTUM AND TESTICLES 10/04/2021     CLINICAL HISTORY:  Edema, unspecified     TECHNIQUE:  Sonography of the scrotum and testes.     COMPARISON:  None.     FINDINGS:  Right Testicle:  *Size: 4.7 x 2.5 x 2.9 cm cm  *Appearance: Normal.  *Flow: Normal arterial and venous flow  *Epididymis: Normal.  *Hydrocele: Large hydrocele measuring 9.9 x 7.4 x 10.1  cm.  *Varicocele: None.     Left Testicle:  *Size: 4.2 x 1.9 x 2.6 cm  *Appearance: Normal.  *Flow: Normal arterial and venous flow  *Epididymis: Normal.  *Hydrocele: Large hydrocele measuring 8.8 x 4.8 x 7.2 cm  *Varicocele: None.     Other findings: None.     Impression:  Large bilateral hydroceles.  No evidence of testicular torsion.    LABS:  I personally reviewed the following lab values:  Lab Results   Component Value Date    WBC 9.09 10/04/2021    HGB 11.7 (L) 10/04/2021    HCT 40.6 10/04/2021     10/04/2021     10/04/2021    K 3.8 10/04/2021    CL 97 10/04/2021    CREATININE 1.5 (H) 10/04/2021    BUN 11 10/04/2021    CO2 28 10/04/2021    INR 1.0 02/19/2021    HGBA1C 6.5 (H) 02/20/2021    CHOL 142 02/20/2021    TRIG 62 02/20/2021    HDL 36 (L) 02/20/2021    ALT 7 (L) 10/04/2021    AST 9 (L) 10/04/2021     Assessment/Plan:   Fransisco Durand is a 72 y.o. male with bilateral large hydroceles, has had a good result with perc drainage and symptoms are much improved. Patient is a poor surgical candidate for an elective procedure, but states that he wants it done. Will obtain scrotal US soon, but will tentatively add him for hydrocele repair 09/12.    Thank you for allowing me the opportunity to participate in this patient's care.     Michael Fernandez MD  Urology

## 2022-09-06 NOTE — TELEPHONE ENCOUNTER
Returned pt's call there was no answer         ----- Message from Eddi Magaña sent at 9/3/2022  9:36 AM CDT -----  Type:  Needs Medical Advice    Who Called: self  Reason:reagarding surgery  Would the patient rather a call back or a response via [facility]? Call  Best Call Back Number: [phone]

## 2022-09-07 NOTE — TELEPHONE ENCOUNTER
Message left for pt to return my call.   Mess from Dr rockwell below:  We need to get clearance from his cardiologist ourselves, and he also didn't go get his repeat scrotal US. He's also on plavix, so 9/12 isn't going to work.     ----- Message from Marcy Rios sent at 9/7/2022 12:03 PM CDT -----  Contact: PT      Who Called:Fransisco     Does the patient know what this is regarding?: pt ready to r/s procedure    Would the patient rather a call back or a response via [facility]?  Callback   Best Call Back Number:.[phone] (home)     Additional Information:

## 2022-11-14 NOTE — Clinical Note
Diagnosis: Hypotension, unspecified hypotension type [0850153]   Admitting Provider:: JESUS RODRIGUEZ [[id number]]   Future Attending Provider: JESUS RODRIGUEZ [[id number]]   Reason for IP Medical Treatment  (Clinical interventions that can only be accomplished in the IP setting? ) :: hypotension, new rbbb   Estimated Length of Stay:: 2 midnights   I certify that Inpatient services for greater than or equal to 2 midnights are medically necessary:: Yes   Plans for Post-Acute care--if anticipated (pick the single best option):: A. No post acute care anticipated at this time  
normal

## 2022-11-15 PROBLEM — K92.1 BLOOD IN STOOL: Status: ACTIVE | Noted: 2022-01-01

## 2022-11-15 PROBLEM — I10 PRIMARY HYPERTENSION: Chronic | Status: ACTIVE | Noted: 2021-02-19

## 2022-11-15 PROBLEM — I45.10 NEW ONSET RIGHT BUNDLE BRANCH BLOCK (RBBB): Status: ACTIVE | Noted: 2022-01-01

## 2022-11-15 PROBLEM — R51.9 HEADACHE: Status: ACTIVE | Noted: 2022-01-01

## 2022-11-15 PROBLEM — I50.42 CHRONIC COMBINED SYSTOLIC AND DIASTOLIC HEART FAILURE: Status: ACTIVE | Noted: 2021-02-19

## 2022-11-15 PROBLEM — Z45.2 ENCOUNTER FOR CENTRAL LINE PLACEMENT: Status: ACTIVE | Noted: 2022-01-01

## 2022-11-15 PROBLEM — D62 ACUTE BLOOD LOSS ANEMIA: Status: ACTIVE | Noted: 2022-01-01

## 2022-11-15 PROBLEM — K92.0 HEMATEMESIS WITHOUT NAUSEA: Status: ACTIVE | Noted: 2022-01-01

## 2022-11-15 PROBLEM — I95.9 HYPOTENSION: Status: ACTIVE | Noted: 2022-01-01

## 2022-11-15 NOTE — CONSULTS
"[facility] - Emergency Dept.  Cardiology  Consult Note    Patient Name: Fransisco Durand  MRN: [id number]  Admission Date: 11/14/2022  Hospital Length of Stay: 0 days  Code Status: Prior   Attending Provider: No att. providers found   Consulting Provider: Trung Whyte MD  Primary Care Physician: Kiera Dhaliwal MD  Principal Problem:<principal problem not specified>    Patient information was obtained from patient and ER records.     Inpatient consult to Cardiology  Consult performed by: Trung Whyte MD  Consult ordered by: Blaise Howard Jr., MD      Subjective:     Chief Complaint:  Abnormal EKG     HPI:   Pt is a y/o male with PMHx for CMY/CHF, CAD Hx of PCI, COPD, HLP, HTN presented with H/A and hypotension. EKG shows NSR, RBBB, LAHB.  Patient denies CP, angina or anginal equivalent.       Past Medical History:   Diagnosis Date    AICD generator infection     Anemia     Anticoagulant long-term use     Arthritis     CAD (coronary artery disease)     CHF (congestive heart failure)     Chronic pain     CKD (chronic kidney disease) stage 3, GFR 30-59 ml/min     COPD (chronic obstructive pulmonary disease)     Diabetes mellitus     Erectile dysfunction     Hyperlipemia     Hypertension     Neuropathy     Vitamin deficiency        Past Surgical History:   Procedure Laterality Date    CARDIAC SURGERY      CARPAL TUNNEL RELEASE      CORONARY ANGIOPLASTY WITH STENT PLACEMENT         Review of patient's allergies indicates:   Allergen Reactions    Asa [aspirin]      Pt states "uncoated" ASA       No current facility-administered medications on file prior to encounter.     Current Outpatient Medications on File Prior to Encounter   Medication Sig    atorvastatin (LIPITOR) 80 MG tablet Take 1 tablet by mouth every evening.    bumetanide (BUMEX) 1 MG tablet Take 1 mg by mouth 2 (two) times a day.    cyanocobalamin (VITAMIN B-12) 1000 MCG tablet Take 1,000 mcg by mouth once daily.    metFORMIN (GLUCOPHAGE-XR) 500 MG ER 24hr " tablet Take 500 mg by mouth every morning.    metoprolol succinate (TOPROL-XL) 25 MG 24 hr tablet Take 1 tablet (25 mg total) by mouth once daily.    potassium chloride (K-TAB) 20 mEq Take 0.2 mEq by mouth 2 (two) times a day.    albuterol (PROVENTIL) 5 mg/mL nebulizer solution Take 2.5 mg by nebulization every 6 (six) hours as needed for Wheezing. Rescue    albuterol (PROVENTIL/VENTOLIN HFA) 90 mcg/actuation inhaler Inhale 2 puffs into the lungs every 6 (six) hours as needed.    aspirin 81 MG Chew Take 81 mg by mouth once daily.    budesonide-formoterol 160-4.5 mcg (SYMBICORT) 160-4.5 mcg/actuation HFAA Inhale 2 puffs into the lungs every 12 (twelve) hours. Controller    clopidogreL (PLAVIX) 75 mg tablet Take 75 mg by mouth once daily.    ergocalciferol (ERGOCALCIFEROL) 50,000 unit Cap Take 50,000 Units by mouth every 7 days.    lidocaine (LIDODERM) 5 % Place 1 patch onto the skin once daily. Remove & Discard patch within 12 hours or as directed by MD    sacubitriL-valsartan (ENTRESTO) 24-26 mg per tablet Take 1 tablet by mouth 2 (two) times daily.    tiotropium (SPIRIVA) 18 mcg inhalation capsule Inhale 18 mcg into the lungs once daily. Controller    [DISCONTINUED] acetaminophen (TYLENOL) 500 MG tablet Take 500 mg by mouth 3 (three) times daily.    [DISCONTINUED] bumetanide (BUMEX) 1 MG tablet Take 1 tablet (1 mg total) by mouth 2 (two) times daily.    [DISCONTINUED] cyanocobalamin (VITAMIN B-12) 1000 MCG tablet Take 100 mcg by mouth once daily.    [DISCONTINUED] escitalopram oxalate (LEXAPRO) 5 MG Tab Take 5 mg by mouth nightly.    [DISCONTINUED] ferrous sulfate 325 (65 FE) MG EC tablet Take 325 mg by mouth every 48 hours.    [DISCONTINUED] gabapentin (NEURONTIN) 300 MG capsule Take 300 mg by mouth every evening.    [DISCONTINUED] hydrALAZINE (APRESOLINE) 25 MG tablet Take 25 mg by mouth 3 (three) times daily.    [DISCONTINUED] metFORMIN (GLUMETZA) 500 MG (MOD) 24 hr tablet Take 500 mg by mouth daily with  breakfast.    [DISCONTINUED] metOLazone (ZAROXOLYN) 5 MG tablet Take 1 tablet (5 mg total) by mouth daily as needed (scrotal swelling).    [DISCONTINUED] sildenafil (VIAGRA) 50 MG tablet Take 50 mg by mouth once a week.    [DISCONTINUED] valsartan (DIOVAN) 40 MG tablet Take 26 mg by mouth 2 (two) times daily.     Family History       Problem Relation (Age of Onset)    Cancer Father    Heart disease Mother          Tobacco Use    Smoking status: Every Day     Packs/day: 1.00     Types: Cigarettes    Smokeless tobacco: Never   Substance and Sexual Activity    Alcohol use: Yes     Comment: very little    Drug use: No    Sexual activity: Yes     Review of Systems   Constitutional: Negative. Negative for weight gain.   HENT: Negative.     Eyes: Negative.    Cardiovascular: Negative.  Negative for chest pain, leg swelling and palpitations.   Respiratory: Negative.  Negative for shortness of breath.    Endocrine: Negative.    Hematologic/Lymphatic: Negative.    Skin: Negative.    Musculoskeletal:  Negative for muscle weakness.   Gastrointestinal: Negative.    Genitourinary: Negative.    Neurological: Negative.  Negative for dizziness.   Psychiatric/Behavioral: Negative.     Allergic/Immunologic: Negative.    All other systems reviewed and are negative.  Objective:     Vital Signs (Most Recent):  Temp: 97.8 °F (36.6 °C) (11/15/22 0823)  Pulse: 93 (11/15/22 1032)  Resp: 17 (11/15/22 1032)  BP: (Abnormal) 105/59 (11/15/22 1032)  SpO2: 97 % (11/15/22 1032)   Vital Signs (24h Range):  Temp:  [97.8 °F (36.6 °C)-98.1 °F (36.7 °C)] 97.8 °F (36.6 °C)  Pulse:  [] 93  Resp:  [15-30] 17  SpO2:  [93 %-100 %] 97 %  BP: ()/(47-76) 105/59     Weight: 127 kg (280 lb)  Body mass index is 42.57 kg/m².    SpO2: 97 %  O2 Device (Oxygen Therapy): room air      Intake/Output Summary (Last 24 hours) at 11/15/2022 1049  Last data filed at 11/15/2022 0055  Gross per 24 hour   Intake 2754 ml   Output no documentation   Net 2754 ml        Lines/Drains/Airways       Central Venous Catheter Line       Name Duration    Percutaneous Central Line Insertion/Assessment - Triple Lumen  11/15/22 0300 right internal jugular <1 day              Peripheral Intravenous Line       Name Duration         Peripheral IV - Single Lumen 11/14/22 2128 18 G Left;Right <1 day                    Physical Exam  Vitals and nursing note reviewed.   Constitutional:       Appearance: He is well-developed.   HENT:      Head: Normocephalic and atraumatic.   Eyes:      Conjunctiva/sclera: Conjunctivae normal.      Pupils: Pupils are equal, round, and reactive to light.   Cardiovascular:      Rate and Rhythm: Normal rate and regular rhythm.      Pulses: Intact distal pulses.      Heart sounds: Normal heart sounds.   Pulmonary:      Effort: Pulmonary effort is normal.      Breath sounds: Normal breath sounds.   Abdominal:      General: Bowel sounds are normal.      Palpations: Abdomen is soft.   Musculoskeletal:      Cervical back: Normal range of motion and neck supple.   Skin:     General: Skin is warm and dry.   Neurological:      Mental Status: He is alert and oriented to person, place, and time.       Significant Labs: All pertinent lab results from the last 24 hours have been reviewed.    Significant Imaging: X-Ray: CXR: X-Ray Chest 1 View (CXR): No results found for this visit on 11/14/22.    Assessment and Plan:     New onset right bundle branch block (RBBB)  Pt is a y/o male with PMHx for CMY/CHF, CAD Hx of PCI, COPD, HLP, HTN presented with H/A and hypotension. EKG shows NSR, RBBB, LAHB.  Patient denies CP, angina or anginal equivalent.     RBBB is not new, EKGs from [facility] with previous finding , probably rate related  Check echo, continue supportive care, wean off pressors , adjust BP meds        VTE Risk Mitigation (From admission, onward)      None            Thank you for your consult.     Trung Whyte MD  Cardiology   [facility] - Emergency Dept.

## 2022-11-15 NOTE — ASSESSMENT & PLAN NOTE
-creatinine trends improved / stable  -monitor urine output, renal function panels, and electrolytes

## 2022-11-15 NOTE — ASSESSMENT & PLAN NOTE
- GI consulted   -Protonix IV twice a day  -NPO for now  -p.r.n. Zofran  -repeat CBC tonight, transfuse for hemoglobin less than 8 or if patient becomes symptomatic

## 2022-11-15 NOTE — NURSING TRANSFER
Nursing Transfer Note      11/15/2022     Reason patient is being transferred: Hematemesis    Transfer From: 237    Transfer via bed    Transfer with pt belongings    Transported by ICU team    Medicines sent: none    Any special needs or follow-up needed: na    Chart send with patient: Yes    Notified: son

## 2022-11-15 NOTE — SUBJECTIVE & OBJECTIVE
"Past Medical History:   Diagnosis Date    AICD generator infection     Anemia     Anticoagulant long-term use     Arthritis     CAD (coronary artery disease)     CHF (congestive heart failure)     Chronic pain     CKD (chronic kidney disease) stage 3, GFR 30-59 ml/min     COPD (chronic obstructive pulmonary disease)     Diabetes mellitus     Erectile dysfunction     Hyperlipemia     Hypertension     Neuropathy     Vitamin deficiency        Past Surgical History:   Procedure Laterality Date    CARDIAC SURGERY      CARPAL TUNNEL RELEASE      CORONARY ANGIOPLASTY WITH STENT PLACEMENT         Review of patient's allergies indicates:   Allergen Reactions    Asa [aspirin]      Pt states "uncoated" ASA       No current facility-administered medications on file prior to encounter.     Current Outpatient Medications on File Prior to Encounter   Medication Sig    atorvastatin (LIPITOR) 80 MG tablet Take 1 tablet by mouth every evening.    bumetanide (BUMEX) 1 MG tablet Take 1 mg by mouth 2 (two) times a day.    cyanocobalamin (VITAMIN B-12) 1000 MCG tablet Take 1,000 mcg by mouth once daily.    metFORMIN (GLUCOPHAGE-XR) 500 MG ER 24hr tablet Take 500 mg by mouth every morning.    metoprolol succinate (TOPROL-XL) 25 MG 24 hr tablet Take 1 tablet (25 mg total) by mouth once daily.    potassium chloride (K-TAB) 20 mEq Take 0.2 mEq by mouth 2 (two) times a day.    albuterol (PROVENTIL) 5 mg/mL nebulizer solution Take 2.5 mg by nebulization every 6 (six) hours as needed for Wheezing. Rescue    albuterol (PROVENTIL/VENTOLIN HFA) 90 mcg/actuation inhaler Inhale 2 puffs into the lungs every 6 (six) hours as needed.    aspirin 81 MG Chew Take 81 mg by mouth once daily.    budesonide-formoterol 160-4.5 mcg (SYMBICORT) 160-4.5 mcg/actuation HFAA Inhale 2 puffs into the lungs every 12 (twelve) hours. Controller    clopidogreL (PLAVIX) 75 mg tablet Take 75 mg by mouth once daily.    ergocalciferol (ERGOCALCIFEROL) 50,000 unit Cap Take " 50,000 Units by mouth every 7 days.    lidocaine (LIDODERM) 5 % Place 1 patch onto the skin once daily. Remove & Discard patch within 12 hours or as directed by MD    sacubitriL-valsartan (ENTRESTO) 24-26 mg per tablet Take 1 tablet by mouth 2 (two) times daily.    tiotropium (SPIRIVA) 18 mcg inhalation capsule Inhale 18 mcg into the lungs once daily. Controller    [DISCONTINUED] acetaminophen (TYLENOL) 500 MG tablet Take 500 mg by mouth 3 (three) times daily.    [DISCONTINUED] bumetanide (BUMEX) 1 MG tablet Take 1 tablet (1 mg total) by mouth 2 (two) times daily.    [DISCONTINUED] cyanocobalamin (VITAMIN B-12) 1000 MCG tablet Take 100 mcg by mouth once daily.    [DISCONTINUED] escitalopram oxalate (LEXAPRO) 5 MG Tab Take 5 mg by mouth nightly.    [DISCONTINUED] ferrous sulfate 325 (65 FE) MG EC tablet Take 325 mg by mouth every 48 hours.    [DISCONTINUED] gabapentin (NEURONTIN) 300 MG capsule Take 300 mg by mouth every evening.    [DISCONTINUED] hydrALAZINE (APRESOLINE) 25 MG tablet Take 25 mg by mouth 3 (three) times daily.    [DISCONTINUED] metFORMIN (GLUMETZA) 500 MG (MOD) 24 hr tablet Take 500 mg by mouth daily with breakfast.    [DISCONTINUED] metOLazone (ZAROXOLYN) 5 MG tablet Take 1 tablet (5 mg total) by mouth daily as needed (scrotal swelling).    [DISCONTINUED] sildenafil (VIAGRA) 50 MG tablet Take 50 mg by mouth once a week.    [DISCONTINUED] valsartan (DIOVAN) 40 MG tablet Take 26 mg by mouth 2 (two) times daily.     Family History       Problem Relation (Age of Onset)    Cancer Father    Heart disease Mother          Tobacco Use    Smoking status: Every Day     Packs/day: 1.00     Types: Cigarettes    Smokeless tobacco: Never   Substance and Sexual Activity    Alcohol use: Yes     Comment: very little    Drug use: No    Sexual activity: Yes     Review of Systems   Constitutional: Negative. Negative for weight gain.   HENT: Negative.     Eyes: Negative.    Cardiovascular: Negative.  Negative for chest  pain, leg swelling and palpitations.   Respiratory: Negative.  Negative for shortness of breath.    Endocrine: Negative.    Hematologic/Lymphatic: Negative.    Skin: Negative.    Musculoskeletal:  Negative for muscle weakness.   Gastrointestinal: Negative.    Genitourinary: Negative.    Neurological: Negative.  Negative for dizziness.   Psychiatric/Behavioral: Negative.     Allergic/Immunologic: Negative.    All other systems reviewed and are negative.  Objective:     Vital Signs (Most Recent):  Temp: 97.8 °F (36.6 °C) (11/15/22 0823)  Pulse: 93 (11/15/22 1032)  Resp: 17 (11/15/22 1032)  BP: (Abnormal) 105/59 (11/15/22 1032)  SpO2: 97 % (11/15/22 1032)   Vital Signs (24h Range):  Temp:  [97.8 °F (36.6 °C)-98.1 °F (36.7 °C)] 97.8 °F (36.6 °C)  Pulse:  [] 93  Resp:  [15-30] 17  SpO2:  [93 %-100 %] 97 %  BP: ()/(47-76) 105/59     Weight: 127 kg (280 lb)  Body mass index is 42.57 kg/m².    SpO2: 97 %  O2 Device (Oxygen Therapy): room air      Intake/Output Summary (Last 24 hours) at 11/15/2022 1049  Last data filed at 11/15/2022 0055  Gross per 24 hour   Intake 2754 ml   Output no documentation   Net 2754 ml       Lines/Drains/Airways       Central Venous Catheter Line       Name Duration    Percutaneous Central Line Insertion/Assessment - Triple Lumen  11/15/22 0300 right internal jugular <1 day              Peripheral Intravenous Line       Name Duration         Peripheral IV - Single Lumen 11/14/22 2128 18 G Left;Right <1 day                    Physical Exam  Vitals and nursing note reviewed.   Constitutional:       Appearance: He is well-developed.   HENT:      Head: Normocephalic and atraumatic.   Eyes:      Conjunctiva/sclera: Conjunctivae normal.      Pupils: Pupils are equal, round, and reactive to light.   Cardiovascular:      Rate and Rhythm: Normal rate and regular rhythm.      Pulses: Intact distal pulses.      Heart sounds: Normal heart sounds.   Pulmonary:      Effort: Pulmonary effort is  normal.      Breath sounds: Normal breath sounds.   Abdominal:      General: Bowel sounds are normal.      Palpations: Abdomen is soft.   Musculoskeletal:      Cervical back: Normal range of motion and neck supple.   Skin:     General: Skin is warm and dry.   Neurological:      Mental Status: He is alert and oriented to person, place, and time.       Significant Labs: All pertinent lab results from the last 24 hours have been reviewed.    Significant Imaging: X-Ray: CXR: X-Ray Chest 1 View (CXR): No results found for this visit on 11/14/22.

## 2022-11-15 NOTE — HPI
Mr. Durand is a 72 year-old M with CHF, DM, COPD, who initially presented to [facility] ED on the evening of 11/14 with complaints of frontal headache, which started 3-4 hours PTA. He took his BP meds prior to arrival, and found to have hypotension with BP 78/47, . EKG noted with RBBB, LAHB. Central line place in ED and he was initiated on levophed. Cardiology consulted. Pt was able to be weaned off levophed. Noted Hb 11.1, and admitted to melanotic stool x1 in ED prior to transfer.  Also noted BUN 43, Cr 1.7, positive stool occult. Per patient he last had colon[facility] a [facility], however he does not recall any abnormal result. He is on ASA & Plavix daily, denies any NSAID use. Cardiology evaluated pt and recommended medical admission as they were able to confirm that RBBB was present on prior EKG obtained at [facility]. Initially was admitted to telemetry, however soon after he arrived in his room, rapid response was called due to hematemesis & hematochezia. Developed recurrence of hypotension shortly after repeat labs obtained showed Hb had dropped to 8.9. Subsequently was transferred to ICU with GI consult placed.    [facility]  Cardiology: Dr. Michele Wyman

## 2022-11-15 NOTE — HOSPITAL COURSE
11/15 - transferred to ICU after Rapid Response on the floor for hematemesis and melena, hypotension   11/16 - EGD today at bedside with GI, off pressors, no further melena reports, pending scope results possible transfer out of ICU

## 2022-11-15 NOTE — NURSING
Received call from monitor tech that pt had come off tele monitor. Upon arrival to pts room, he was sitting up on the toilet with PCT present in room. Pt had vomited what appeared to be blood at BS, and upon visualization of pt in restroom, there was emesis present on his chest and in the floor also appearing to have blood present. Pt AAOx4. BP 86/52 . Rapid Response team called. Pt transferred from toilet back to bed. Black stool present in toilet.     Pt being transported to ICU via bed for further tx.

## 2022-11-15 NOTE — SUBJECTIVE & OBJECTIVE
"Past Medical History:   Diagnosis Date    AICD generator infection     Anemia     Anticoagulant long-term use     Arthritis     CAD (coronary artery disease)     CHF (congestive heart failure)     Chronic pain     CKD (chronic kidney disease) stage 3, GFR 30-59 ml/min     COPD (chronic obstructive pulmonary disease)     Diabetes mellitus     Erectile dysfunction     Hyperlipemia     Hypertension     Neuropathy     Vitamin deficiency        Past Surgical History:   Procedure Laterality Date    CARDIAC SURGERY      CARPAL TUNNEL RELEASE      CORONARY ANGIOPLASTY WITH STENT PLACEMENT         Review of patient's allergies indicates:   Allergen Reactions    Asa [aspirin]      Pt states "uncoated" ASA       No current facility-administered medications on file prior to encounter.     Current Outpatient Medications on File Prior to Encounter   Medication Sig    albuterol (PROVENTIL) 5 mg/mL nebulizer solution Take 2.5 mg by nebulization every 6 (six) hours as needed for Wheezing. Rescue    albuterol (PROVENTIL/VENTOLIN HFA) 90 mcg/actuation inhaler Inhale 2 puffs into the lungs every 6 (six) hours as needed.    aspirin 81 MG Chew Take 81 mg by mouth once daily.    atorvastatin (LIPITOR) 80 MG tablet Take 1 tablet by mouth every evening.    budesonide-formoterol 160-4.5 mcg (SYMBICORT) 160-4.5 mcg/actuation HFAA Inhale 2 puffs into the lungs every 12 (twelve) hours. Controller    bumetanide (BUMEX) 1 MG tablet Take 1 mg by mouth 2 (two) times a day.    clopidogreL (PLAVIX) 75 mg tablet Take 75 mg by mouth once daily.    cyanocobalamin (VITAMIN B-12) 1000 MCG tablet Take 1,000 mcg by mouth once daily.    ergocalciferol (ERGOCALCIFEROL) 50,000 unit Cap Take 50,000 Units by mouth every 7 days.    lidocaine (LIDODERM) 5 % Place 1 patch onto the skin once daily. Remove & Discard patch within 12 hours or as directed by MD    metFORMIN (GLUCOPHAGE-XR) 500 MG ER 24hr tablet Take 500 mg by mouth every morning.    metoprolol succinate " (TOPROL-XL) 25 MG 24 hr tablet Take 1 tablet (25 mg total) by mouth once daily.    potassium chloride (K-TAB) 20 mEq Take 0.2 mEq by mouth 2 (two) times a day.    sacubitriL-valsartan (ENTRESTO) 24-26 mg per tablet Take 1 tablet by mouth 2 (two) times daily.    tiotropium (SPIRIVA) 18 mcg inhalation capsule Inhale 18 mcg into the lungs once daily. Controller    [DISCONTINUED] acetaminophen (TYLENOL) 500 MG tablet Take 500 mg by mouth 3 (three) times daily.    [DISCONTINUED] bumetanide (BUMEX) 1 MG tablet Take 1 tablet (1 mg total) by mouth 2 (two) times daily.    [DISCONTINUED] cyanocobalamin (VITAMIN B-12) 1000 MCG tablet Take 100 mcg by mouth once daily.    [DISCONTINUED] escitalopram oxalate (LEXAPRO) 5 MG Tab Take 5 mg by mouth nightly.    [DISCONTINUED] ferrous sulfate 325 (65 FE) MG EC tablet Take 325 mg by mouth every 48 hours.    [DISCONTINUED] gabapentin (NEURONTIN) 300 MG capsule Take 300 mg by mouth every evening.    [DISCONTINUED] hydrALAZINE (APRESOLINE) 25 MG tablet Take 25 mg by mouth 3 (three) times daily.    [DISCONTINUED] metFORMIN (GLUMETZA) 500 MG (MOD) 24 hr tablet Take 500 mg by mouth daily with breakfast.    [DISCONTINUED] metOLazone (ZAROXOLYN) 5 MG tablet Take 1 tablet (5 mg total) by mouth daily as needed (scrotal swelling).    [DISCONTINUED] sildenafil (VIAGRA) 50 MG tablet Take 50 mg by mouth once a week.    [DISCONTINUED] valsartan (DIOVAN) 40 MG tablet Take 26 mg by mouth 2 (two) times daily.     Family History       Problem Relation (Age of Onset)    Cancer Father    Heart disease Mother          Tobacco Use    Smoking status: Every Day     Packs/day: 1.00     Types: Cigarettes    Smokeless tobacco: Never   Substance and Sexual Activity    Alcohol use: Yes     Comment: very little    Drug use: No    Sexual activity: Yes     Review of Systems   Constitutional:  Negative for chills and fever.   HENT: Negative.     Eyes: Negative.    Respiratory:  Negative for shortness of breath and  wheezing.         Chronic smoker with COPD   Cardiovascular:  Positive for leg swelling. Negative for chest pain.        Reports swelling is at baseline   Gastrointestinal:  Positive for blood in stool. Negative for abdominal pain, nausea and vomiting.   Endocrine: Negative.    Genitourinary:  Negative for decreased urine volume, difficulty urinating, dysuria and frequency.   Musculoskeletal: Negative.    Neurological:  Positive for headaches. Negative for dizziness.   All other systems reviewed and are negative.  Objective:     Vital Signs (Most Recent):  Temp: 98.1 °F (36.7 °C) (11/15/22 1325)  Pulse: 94 (11/15/22 1325)  Resp: 20 (11/15/22 1325)  BP: 103/64 (11/15/22 1325)  SpO2: (!) 94 % (11/15/22 1325)   Vital Signs (24h Range):  Temp:  [97.8 °F (36.6 °C)-98.1 °F (36.7 °C)] 98.1 °F (36.7 °C)  Pulse:  [] 94  Resp:  [15-30] 20  SpO2:  [93 %-100 %] 94 %  BP: ()/(47-76) 103/64     Weight: 127 kg (280 lb)  Body mass index is 42.57 kg/m².    Physical Exam  Vitals and nursing note reviewed. Exam conducted with a chaperone present.   Constitutional:       General: He is not in acute distress.     Appearance: He is obese. He is not ill-appearing.   HENT:      Head: Normocephalic and atraumatic.      Right Ear: External ear normal.      Left Ear: External ear normal.      Nose: Nose normal.      Mouth/Throat:      Mouth: Mucous membranes are moist.   Eyes:      Extraocular Movements: Extraocular movements intact.      Pupils: Pupils are equal, round, and reactive to light.   Cardiovascular:      Rate and Rhythm: Normal rate and regular rhythm.   Pulmonary:      Effort: Pulmonary effort is normal. No respiratory distress.      Breath sounds: No wheezing.      Comments: On room air  Abdominal:      General: Bowel sounds are normal.      Palpations: Abdomen is soft.      Tenderness: There is no abdominal tenderness. There is no guarding or rebound.   Genitourinary:     Comments: Deferred   Musculoskeletal:       Cervical back: Neck supple.      Right lower leg: Edema present.      Left lower leg: Edema present.   Skin:     General: Skin is warm and dry.      Comments: Venous stasis changes on LE   Neurological:      Mental Status: He is alert and oriented to person, place, and time. Mental status is at baseline.   Psychiatric:      Comments: Calm, cooperative         CRANIAL NERVES     CN III, IV, VI   Pupils are equal, round, and reactive to light.     Significant Labs: All pertinent labs within the past 24 hours have been reviewed.  Blood Culture:   Recent Labs   Lab 11/14/22 2129 11/14/22 2156   LABBLOO No Growth to date No Growth to date     CBC:   Recent Labs   Lab 11/14/22 2130   WBC 10.00   HGB 11.1*   HCT 34.8*        CMP:   Recent Labs   Lab 11/14/22 2130      K 4.6      CO2 23   GLU 89   BUN 43*   CREATININE 1.7*   CALCIUM 8.3*   PROT 5.8*   ALBUMIN 2.7*   BILITOT 0.3   ALKPHOS 91   AST 10   ALT 10   ANIONGAP 10     Cardiac Markers:   Recent Labs   Lab 11/14/22 2130   BNP 22       Significant Imaging: I have reviewed all pertinent imaging results/findings within the past 24 hours.

## 2022-11-15 NOTE — ASSESSMENT & PLAN NOTE
Pt is a y/o male with PMHx for CMY/CHF, CAD Hx of PCI, COPD, HLP, HTN presented with H/A and hypotension. EKG shows NSR, RBBB, LAHB.  Patient denies CP, angina or anginal equivalent.     RBBB is not new, EKGs from [facility] with previous finding , probably rate related  Check echo, continue supportive care, wean off pressors , adjust BP meds

## 2022-11-15 NOTE — PHARMACY MED REC
"Admission Medication History     The home medication history was taken by Lincoln Cuadra.    You may go to "Admission" then "Reconcile Home Medications" tabs to review and/or act upon these items.     The home medication list has been updated by the Pharmacy department.   Please read ALL comments highlighted in yellow.   Please address this information as you see fit.    Feel free to contact us if you have any questions or require assistance.      Medications listed below were obtained from: Patient/family and Analytic software- "[employer]  (Not in a hospital admission)      Lincoln Cuadra  [id number]    Current Outpatient Medications on File Prior to Encounter   Medication Sig Dispense Refill Last Dose    albuterol (PROVENTIL) 5 mg/mL nebulizer solution Take 2.5 mg by nebulization every 6 (six) hours as needed for Wheezing. Rescue   Past Month    albuterol (PROVENTIL/VENTOLIN HFA) 90 mcg/actuation inhaler Inhale 2 puffs into the lungs every 6 (six) hours as needed.   Past Month    aspirin 81 MG Chew Take 81 mg by mouth once daily.   11/14/2022    atorvastatin (LIPITOR) 80 MG tablet Take 1 tablet by mouth every evening.   11/14/2022    budesonide-formoterol 160-4.5 mcg (SYMBICORT) 160-4.5 mcg/actuation HFAA Inhale 2 puffs into the lungs every 12 (twelve) hours. Controller   Past Month    bumetanide (BUMEX) 1 MG tablet Take 1 mg by mouth 2 (two) times a day.   11/14/2022    clopidogreL (PLAVIX) 75 mg tablet Take 75 mg by mouth once daily.   11/14/2022    cyanocobalamin (VITAMIN B-12) 1000 MCG tablet Take 1,000 mcg by mouth once daily.   11/14/2022    ergocalciferol (ERGOCALCIFEROL) 50,000 unit Cap Take 50,000 Units by mouth every 7 days.   Past Week    lidocaine (LIDODERM) 5 % Place 1 patch onto the skin once daily. Remove & Discard patch within 12 hours or as directed by MD   Past Week    metFORMIN (GLUCOPHAGE-XR) 500 MG ER 24hr tablet Take 500 mg by mouth every morning.   11/14/2022    metoprolol succinate " (TOPROL-XL) 25 MG 24 hr tablet Take 1 tablet (25 mg total) by mouth once daily. 90 tablet 3 11/14/2022    potassium chloride (K-TAB) 20 mEq Take 0.2 mEq by mouth 2 (two) times a day.   11/14/2022    sacubitriL-valsartan (ENTRESTO) 24-26 mg per tablet Take 1 tablet by mouth 2 (two) times daily.   11/14/2022    tiotropium (SPIRIVA) 18 mcg inhalation capsule Inhale 18 mcg into the lungs once daily. Controller   Past Month                           .

## 2022-11-15 NOTE — CONSULTS
[facility] ([facility])  Critical Care Medicine  Consult Note    Patient Name: Fransisco Durand  MRN: [id number]  Admission Date: 11/14/2022  Hospital Length of Stay: 0 days  Code Status: Full Code  Attending Physician: Dr. Hernandez  Primary Care Provider: Kiera Dhaliwal MD   Principal Problem: Acute blood loss anemia    Subjective:     HPI:  72-year-old male with a known past medical history of combined heart failure with the EF of 35% and grade 1 diastolic dysfunction, remote AICD which was removed secondary to infection, anemia, arthritis, CAD with PCI, CKD, COPD, diabetes mellitus type 2, hypertension, obesity and neuropathy that presented to an outside ER 11/14 with complaint of headache.  Patient was found to be hypotensive.  Ultimately, patient required insertion of right IJ central line initiation of Levophed.  Patient was transferred to [facility] for higher level of care.  Upon arrival to the ER in [place] he was weaned from his Levophed and admitted to the floor.  At that time, source of hypotension was unclear.  Shortly after admission, rapid response was called after patient had an episode of hematemesis and he was transferred to the ICU.  Upon further questioning.  Patient reports dark melanotic stool that he 1st noticed 11/13 or 11/14.  Repeat labs do show a drop in H&H, but blood pressure holding.  GI has been consulted for further evaluation.    At the time of my exam in the ICU, patient is awake and alert on room air in no acute distress.  He is able to provide history and appears to be in good spirits.  His blood pressure is low normal.  He is tachycardic.  He is getting a saline bolus.       Hospital/ICU Course:  11/15 - transferred to ICU after Rapid Response on the floor for hematemesis and melena, hypotension       Past Medical History:   Diagnosis Date    AICD generator infection     Anemia     Anticoagulant long-term use     Arthritis     CAD (coronary artery disease)   "   CHF (congestive heart failure)     Chronic pain     CKD (chronic kidney disease) stage 3, GFR 30-59 ml/min     COPD (chronic obstructive pulmonary disease)     Diabetes mellitus     Erectile dysfunction     Hyperlipemia     Hypertension     Neuropathy     Vitamin deficiency        Past Surgical History:   Procedure Laterality Date    CARDIAC SURGERY      CARPAL TUNNEL RELEASE      CORONARY ANGIOPLASTY WITH STENT PLACEMENT         Review of patient's allergies indicates:   Allergen Reactions    Asa [aspirin]      Pt states "uncoated" ASA       Family History       Problem Relation (Age of Onset)    Cancer Father    Heart disease Mother          Tobacco Use    Smoking status: Every Day     Packs/day: 1.00     Types: Cigarettes    Smokeless tobacco: Never   Substance and Sexual Activity    Alcohol use: Yes     Comment: very little    Drug use: No    Sexual activity: Yes         Review of Systems   Cardiovascular:  Positive for leg swelling (chronic).   Gastrointestinal:  Positive for vomiting. Negative for nausea.        Hematemesis and melena   All other systems reviewed and are negative.  Objective:     Vital Signs (Most Recent):  Temp: 97.9 °F (36.6 °C) (11/15/22 1700)  Pulse: 108 (11/15/22 1702)  Resp: 20 (11/15/22 1702)  BP: (!) 99/58 (11/15/22 1702)  SpO2: (!) 94 % (11/15/22 1702)   Vital Signs (24h Range):  Temp:  [97.8 °F (36.6 °C)-98.1 °F (36.7 °C)] 97.9 °F (36.6 °C)  Pulse:  [] 108  Resp:  [15-30] 20  SpO2:  [93 %-100 %] 94 %  BP: ()/(47-84) 99/58     Weight: 127 kg (280 lb)  Body mass index is 42.57 kg/m².      Intake/Output Summary (Last 24 hours) at 11/15/2022 1704  Last data filed at 11/15/2022 1655  Gross per 24 hour   Intake 4138.7 ml   Output --   Net 4138.7 ml       Physical Exam  Constitutional:       General: He is awake. He is not in acute distress.     Appearance: He is obese.      Comments: Chronically ill appearing male semi-upright in bed in NAD "   Cardiovascular:      Rate and Rhythm: Tachycardia present.      Comments: Generalized edema, chronic lymphedema to BLE  Pulmonary:      Effort: Pulmonary effort is normal.      Breath sounds: Decreased breath sounds present.   Abdominal:      General: There is no distension.      Palpations: Abdomen is soft.      Tenderness: There is no abdominal tenderness.   Skin:     General: Skin is warm and dry.   Neurological:      General: No focal deficit present.      Mental Status: He is alert.   Psychiatric:         Behavior: Behavior is cooperative.           Lines/Drains/Airways       Central Venous Catheter Line  Duration             Percutaneous Central Line Insertion/Assessment - Triple Lumen  11/15/22 0300 right internal jugular <1 day              Peripheral Intravenous Line  Duration                  Peripheral IV - Single Lumen 11/14/22 2128 18 G Left;Right <1 day                    Significant Labs:    CBC/Anemia Profile:  Recent Labs   Lab 11/14/22  2130 11/15/22  0206 11/15/22  1526   WBC 10.00  --  10.06   HGB 11.1*  --  8.9*   HCT 34.8*  --  28.2*     --  183   MCV 86  --  86   RDW 16.8*  --  17.0*   OCCULTBLOOD  --  Positive*  --         Chemistries:  Recent Labs   Lab 11/14/22  2130 11/15/22  1526    138   K 4.6 4.6    105   CO2 23 28   BUN 43* 38*   CREATININE 1.7* 1.2   CALCIUM 8.3* 8.3*   ALBUMIN 2.7*  --    PROT 5.8*  --    BILITOT 0.3  --    ALKPHOS 91  --    ALT 10  --    AST 10  --    MG 1.8  --    PHOS 2.7  --        BMP:   Recent Labs   Lab 11/14/22  2130 11/15/22  1526   GLU 89 85    138   K 4.6 4.6    105   CO2 23 28   BUN 43* 38*   CREATININE 1.7* 1.2   CALCIUM 8.3* 8.3*   MG 1.8  --      All pertinent labs within the past 24 hours have been reviewed.    Significant Imaging:   I have reviewed all pertinent imaging results/findings within the past 24 hours.      ABG  No results for input(s): PH, PO2, PCO2, HCO3, BE in the last 168 hours.  Assessment/Plan:      Pulmonary  COPD (chronic obstructive pulmonary disease)  -not an acute exacerbation  -Reports he is in smoking cessation currently  -will provide p.r.n. nebs for wheezing    Cardiac/Vascular  HLD (hyperlipidemia)  See heart failure      CAD (coronary artery disease)  See heart failure      HTN (hypertension)  See heart failure    Chronic combined systolic and diastolic heart failure  -patient briefly required Levophed this morning, current blood pressure is low normal   -hold blood thinners and antiplatelets secondary to GI bleeding  -Hold blood pressure medications given borderline blood pressure readings  -monitor trends and clinical course, resume home meds as indicated  -monitor telemetry  -echo reviewed (EF 35% with grade I diastolic dysfunction), appears compensated currently      Hypotension  See acute blood loss anemia    Renal/  CKD (chronic kidney disease) stage 3, GFR 30-59 ml/min  -creatinine trends improved / stable  -monitor urine output, renal function panels, and electrolytes    Oncology  * Acute blood loss anemia  - GI consulted   -Protonix IV twice a day  -NPO for now  -p.r.n. Zofran  -repeat CBC tonight, transfuse for hemoglobin less than 8 or if patient becomes symptomatic    Endocrine  DM2 (diabetes mellitus, type 2)  -SSI    GI  Hematemesis without nausea  See acute blood loss anemia        Melena  See acute blood loss anemia         Siva Davis, NP  Critical Care Medicine  [facility] ([facility])

## 2022-11-15 NOTE — HPI
72-year-old male with a known past medical history of combined heart failure with the EF of 35% and grade 1 diastolic dysfunction, remote AICD which was removed secondary to infection, anemia, arthritis, CAD with PCI, CKD, COPD, diabetes mellitus type 2, hypertension, obesity and neuropathy that presented to an outside ER 11/14 with complaint of headache.  Patient was found to be hypotensive.  Ultimately, patient required insertion of right IJ central line initiation of Levophed.  Patient was transferred to [facility] for higher level of care.  Upon arrival to the ER in [place] he was weaned from his Levophed and admitted to the floor.  At that time, source of hypotension was unclear.  Shortly after admission, rapid response was called after patient had an episode of hematemesis and he was transferred to the ICU.  Upon further questioning.  Patient reports dark melanotic stool that he 1st noticed 11/13 or 11/14.  Repeat labs do show a drop in H&H, but blood pressure holding.  GI has been consulted for further evaluation. Plans for EGD 11/16.

## 2022-11-15 NOTE — PROGRESS NOTES
Contacted by ICU team about patient for concerns of GI bleeding. Patient admitted yesterday with hypotension of uncertain etiology which later improved. Today, he had an episode of hematemesis and melena. Hb 8.9 from 11.1 yesterday. Discussed with ICU physician Dr. Hernandez- patient initiated onto IV PPI. Currently hemodynamically stable and he has already eaten today. Will therefore keep NPO him starting now and plan tentatively for EGD tomorrow. Full consult note to follow in the am.     Lyndsay Whitten MD  Gastroenterology

## 2022-11-15 NOTE — ASSESSMENT & PLAN NOTE
-patient briefly required Levophed this morning, current blood pressure is low normal   -hold blood thinners and antiplatelets secondary to GI bleeding  -Hold blood pressure medications given borderline blood pressure readings  -monitor trends and clinical course, resume home meds as indicated  -monitor telemetry  -echo reviewed (EF 35% with grade I diastolic dysfunction), appears compensated currently

## 2022-11-15 NOTE — SUBJECTIVE & OBJECTIVE
"Past Medical History:   Diagnosis Date    AICD generator infection     Anemia     Anticoagulant long-term use     Arthritis     CAD (coronary artery disease)     CHF (congestive heart failure)     Chronic pain     CKD (chronic kidney disease) stage 3, GFR 30-59 ml/min     COPD (chronic obstructive pulmonary disease)     Diabetes mellitus     Erectile dysfunction     Hyperlipemia     Hypertension     Neuropathy     Vitamin deficiency        Past Surgical History:   Procedure Laterality Date    CARDIAC SURGERY      CARPAL TUNNEL RELEASE      CORONARY ANGIOPLASTY WITH STENT PLACEMENT         Review of patient's allergies indicates:   Allergen Reactions    Asa [aspirin]      Pt states "uncoated" ASA       Family History       Problem Relation (Age of Onset)    Cancer Father    Heart disease Mother          Tobacco Use    Smoking status: Every Day     Packs/day: 1.00     Types: Cigarettes    Smokeless tobacco: Never   Substance and Sexual Activity    Alcohol use: Yes     Comment: very little    Drug use: No    Sexual activity: Yes         Review of Systems   Cardiovascular:  Positive for leg swelling (chronic).   Gastrointestinal:  Positive for vomiting. Negative for nausea.        Hematemesis and melena   All other systems reviewed and are negative.  Objective:     Vital Signs (Most Recent):  Temp: 97.9 °F (36.6 °C) (11/15/22 1700)  Pulse: 108 (11/15/22 1702)  Resp: 20 (11/15/22 1702)  BP: (!) 99/58 (11/15/22 1702)  SpO2: (!) 94 % (11/15/22 1702)   Vital Signs (24h Range):  Temp:  [97.8 °F (36.6 °C)-98.1 °F (36.7 °C)] 97.9 °F (36.6 °C)  Pulse:  [] 108  Resp:  [15-30] 20  SpO2:  [93 %-100 %] 94 %  BP: ()/(47-84) 99/58     Weight: 127 kg (280 lb)  Body mass index is 42.57 kg/m².      Intake/Output Summary (Last 24 hours) at 11/15/2022 1704  Last data filed at 11/15/2022 1655  Gross per 24 hour   Intake 4138.7 ml   Output --   Net 4138.7 ml       Physical Exam  Constitutional:       General: He is awake. He is " not in acute distress.     Appearance: He is obese.      Comments: Chronically ill appearing male semi-upright in bed in NAD   Cardiovascular:      Rate and Rhythm: Tachycardia present.      Comments: Generalized edema, chronic lymphedema to BLE  Pulmonary:      Effort: Pulmonary effort is normal.      Breath sounds: Decreased breath sounds present.   Abdominal:      General: There is no distension.      Palpations: Abdomen is soft.      Tenderness: There is no abdominal tenderness.   Skin:     General: Skin is warm and dry.   Neurological:      General: No focal deficit present.      Mental Status: He is alert.   Psychiatric:         Behavior: Behavior is cooperative.       Vents:       Lines/Drains/Airways       Central Venous Catheter Line  Duration             Percutaneous Central Line Insertion/Assessment - Triple Lumen  11/15/22 0300 right internal jugular <1 day              Peripheral Intravenous Line  Duration                  Peripheral IV - Single Lumen 11/14/22 2128 18 G Left;Right <1 day                    Significant Labs:    CBC/Anemia Profile:  Recent Labs   Lab 11/14/22  2130 11/15/22  0206 11/15/22  1526   WBC 10.00  --  10.06   HGB 11.1*  --  8.9*   HCT 34.8*  --  28.2*     --  183   MCV 86  --  86   RDW 16.8*  --  17.0*   OCCULTBLOOD  --  Positive*  --         Chemistries:  Recent Labs   Lab 11/14/22  2130 11/15/22  1526    138   K 4.6 4.6    105   CO2 23 28   BUN 43* 38*   CREATININE 1.7* 1.2   CALCIUM 8.3* 8.3*   ALBUMIN 2.7*  --    PROT 5.8*  --    BILITOT 0.3  --    ALKPHOS 91  --    ALT 10  --    AST 10  --    MG 1.8  --    PHOS 2.7  --        BMP:   Recent Labs   Lab 11/14/22  2130 11/15/22  1526   GLU 89 85    138   K 4.6 4.6    105   CO2 23 28   BUN 43* 38*   CREATININE 1.7* 1.2   CALCIUM 8.3* 8.3*   MG 1.8  --      All pertinent labs within the past 24 hours have been reviewed.    Significant Imaging:   I have reviewed all pertinent imaging results/findings  within the past 24 hours.

## 2022-11-15 NOTE — HPI
Pt is a y/o male with PMHx for CMY/CHF, CAD Hx of PCI, COPD, HLP, HTN presented with H/A and hypotension. EKG shows NSR, RBBB, LAHB.  Patient denies CP, angina or anginal equivalent.

## 2022-11-15 NOTE — ASSESSMENT & PLAN NOTE
-not an acute exacerbation  -Reports he is in smoking cessation currently  -will provide p.r.n. nebs for wheezing

## 2022-11-15 NOTE — NURSING
Responded to [facility] tele room 237. Patient found sitting on on toilet in restroom, AAOx3. Patient had bright red bloody emesis on chest and dark black formed stool in toilet. Patient cleaned, able to stand and abulate with assistance back to bed. Dr López present gave verbal order to start 1 liter normal saline IV bolus and transfer to ICU. Pt brought to ICU bed 9 via bed, connected to cardiac monitor. Initial VS stable, Dr Hernandez at bedside assessing patient. Notified Dr Whitten of new consult. Patient denies all pain and SOB. Liter bolus of normal saline started. Pt has patent 18G IV to RFA. Pt kept NPO for possible EGD in AM per Dr Whitten. Patient spoke with his son per his personal phone after arrival to ICU.

## 2022-11-15 NOTE — ED PROVIDER NOTES
"Encounter Date: 11/14/2022       History     Chief Complaint   Patient presents with    Headache     Headache began 3-4 hours pta. Was sitting in the bed with his feet up when pain began.      The history is provided by the patient.   Headache   This is a new problem. The current episode started today. The problem occurs constantly. The problem has been unchanged. The pain is located in the Frontal region. The pain does not radiate. The pain quality is not similar to prior headaches. The quality of the pain is described as aching and dull. Pain scale: mild. Pertinent negatives include no back pain, fever, nausea, sore throat or weakness. Associated symptoms comments: hypotension. Nothing aggravates the symptoms. He has tried nothing for the symptoms. The treatment provided no relief.   Review of patient's allergies indicates:   Allergen Reactions    Asa [aspirin]      Pt states "uncoated" ASA     Past Medical History:   Diagnosis Date    AICD generator infection     Anemia     Anticoagulant long-term use     Arthritis     CAD (coronary artery disease)     CHF (congestive heart failure)     Chronic pain     CKD (chronic kidney disease) stage 3, GFR 30-59 ml/min     COPD (chronic obstructive pulmonary disease)     Diabetes mellitus     Erectile dysfunction     Hyperlipemia     Hypertension     Neuropathy     Vitamin deficiency      Past Surgical History:   Procedure Laterality Date    CARDIAC SURGERY      CARPAL TUNNEL RELEASE      CORONARY ANGIOPLASTY WITH STENT PLACEMENT       Family History   Problem Relation Age of Onset    Heart disease Mother     Cancer Father      Social History     Tobacco Use    Smoking status: Every Day     Packs/day: 1.00     Types: Cigarettes    Smokeless tobacco: Never   Substance Use Topics    Alcohol use: Yes     Comment: very little    Drug use: No     Review of Systems   Constitutional:  Negative for fever.   HENT:  Negative for sore throat.    Respiratory:  Negative for shortness of " breath.    Cardiovascular:  Negative for chest pain.   Gastrointestinal:  Negative for nausea.   Genitourinary:  Negative for dysuria.   Musculoskeletal:  Negative for back pain.   Skin:  Negative for rash.   Neurological:  Positive for headaches. Negative for weakness.   Hematological:  Does not bruise/bleed easily.   All other systems reviewed and are negative.    Physical Exam     Initial Vitals   BP Pulse Resp Temp SpO2   11/14/22 2132 11/14/22 2117 11/14/22 2117 11/14/22 2117 11/14/22 2132   (!) 78/47 (!) 112 16 98.1 °F (36.7 °C) (!) 94 %      MAP       --                Physical Exam    Nursing note and vitals reviewed.  Constitutional: He appears well-developed and well-nourished. He is not diaphoretic. No distress.   HENT:   Head: Normocephalic and atraumatic.       Nose: Nose normal.   HA in area diagrammed. No palpable fractures/skull depressions.    Eyes: Conjunctivae and EOM are normal. Pupils are equal, round, and reactive to light. No scleral icterus.   Neck: Phonation normal. Neck supple. No thyromegaly present.   Normal range of motion.   Full passive range of motion without pain.     Cardiovascular:  Regular rhythm, normal heart sounds and intact distal pulses.   Tachycardia present.   Exam reveals no gallop and no friction rub.       No murmur heard.  Pulmonary/Chest: No respiratory distress. He has no wheezes. He has rhonchi in the right lower field and the left lower field. He exhibits no tenderness.   Abdominal: Abdomen is soft. Bowel sounds are normal. He exhibits no distension. There is no abdominal tenderness. No hernia. There is no rebound and no guarding.   Musculoskeletal:         General: No tenderness or edema. Normal range of motion.      Right wrist: Normal. Normal pulse.      Left wrist: Normal. Normal pulse.      Right hand: Normal. Normal capillary refill. Normal pulse.      Left hand: Normal. Normal capillary refill. Normal pulse.      Cervical back: Normal, full passive range of  motion without pain, normal range of motion and neck supple. No spinous process tenderness.      Thoracic back: Normal.      Lumbar back: Normal.      Right foot: Normal capillary refill. Swelling present. Normal pulse.      Left foot: Normal capillary refill. Swelling present. Normal pulse.      Comments: Chronic bilateral LE edema. Pt denies any ttp.     Lymphadenopathy:     He has no cervical adenopathy.   Neurological: He is alert and oriented to person, place, and time. He has normal strength. No cranial nerve deficit or sensory deficit. GCS score is 15. GCS eye subscore is 4. GCS verbal subscore is 5. GCS motor subscore is 6.   No acute focal neuro deficits   Skin: Skin is warm and dry. Capillary refill takes less than 2 seconds.   Psychiatric: He has a normal mood and affect. His behavior is normal. Judgment and thought content normal.       ED Course   Central Line    Date/Time: 11/15/2022 1:53 AM  Performed by: Blaise Howard Jr., MD  Authorized by: Blaise Howard Jr., MD     Location procedure was performed:  [facility] EMERGENCY DEPARTMENT  Consent Done ?:  Yes  Time out complete?: Verified correct patient, procedure, equipment, staff, and site/side    Indications:  Med administration, vascular access and hemodynamic monitoring  Anesthesia:  Local infiltration  Local anesthetic:  Lidocaine 1% without epinephrine  Anesthetic total (ml):  3  Preparation:  Skin prepped with ChloraPrep  Skin prep agent dried: Skin prep agent completely dried prior to procedure    Sterile barriers: All five maximal sterile barriers used - gloves, gown, cap, mask and large sterile sheet    Hand hygiene: Hand hygiene performed immediately prior to central venous catheter insertion    Location:  Right internal jugular  Catheter type:  Triple lumen  Catheter size:  7 Fr  Inserted Catheter Length (cm):  15  Ultrasound guidance: Yes    Vessel Caliber:  Large   patent  Comprressibility:  Normal  Needle advanced into vessel with real time  ultrasound guidance.    Guidewire confirmed in vessel.    Image recorded and saved.    Steril sheath on probe.    Sterile gel used.  Manometry: No    Number of attempts:  1  Securement:  Line sutured, chlorhexidine patch, sterile dressing applied and blood return through all ports  Complications: No    Specimens: No    Implants: No    XRay:  Placement verified by x-ray, tip termination, successful placement and no pneumothorax on x-ray  Adverse Events:  NoneTermination Site: right atrium  Critical Care    Date/Time: 11/15/2022 1:53 AM  Performed by: Blaise Howard Jr., MD  Authorized by: Blaise Howard Jr., MD   Direct patient critical care time: 10 minutes  Additional history critical care time: 10 minutes  Ordering / reviewing critical care time: 10 minutes  Documentation critical care time: 10 minutes  Consulting other physicians critical care time: 10 minutes  Consult with family critical care time: 10 minutes  Other critical care time: 15 minutes  Total critical care time (exclusive of procedural time) : 75 minutes  Critical care time was exclusive of separately billable procedures and treating other patients and teaching time.  Critical care was necessary to treat or prevent imminent or life-threatening deterioration of the following conditions: cardiac failure.  Critical care was time spent personally by me on the following activities: blood draw for specimens, development of treatment plan with patient or surrogate, discussions with consultants, interpretation of cardiac output measurements, evaluation of patient's response to treatment, examination of patient, obtaining history from patient or surrogate, ordering and performing treatments and interventions, ordering and review of laboratory studies, ordering and review of radiographic studies, pulse oximetry, re-evaluation of patient's condition and review of old charts.      Labs Reviewed   CBC W/ AUTO DIFFERENTIAL - Abnormal; Notable for the following  components:       Result Value    RBC 4.04 (*)     Hemoglobin 11.1 (*)     Hematocrit 34.8 (*)     MCHC 31.9 (*)     RDW 16.8 (*)     Immature Granulocytes 0.8 (*)     Immature Grans (Abs) 0.08 (*)     Gran % 77.3 (*)     Lymph % 12.0 (*)     All other components within normal limits   COMPREHENSIVE METABOLIC PANEL - Abnormal; Notable for the following components:    BUN 43 (*)     Creatinine 1.7 (*)     Calcium 8.3 (*)     Total Protein 5.8 (*)     Albumin 2.7 (*)     eGFR 42.6 (*)     All other components within normal limits   URINALYSIS, REFLEX TO URINE CULTURE - Abnormal; Notable for the following components:    Occult Blood UA Trace (*)     Leukocytes, UA Trace (*)     All other components within normal limits    Narrative:     Specimen Source->Urine   OCCULT BLOOD X 1, STOOL - Abnormal; Notable for the following components:    Occult Blood Positive (*)     All other components within normal limits   CULTURE, BLOOD   CULTURE, BLOOD   LACTIC ACID, PLASMA   MAGNESIUM   PHOSPHORUS   PROTIME-INR   B-TYPE NATRIURETIC PEPTIDE   LIPASE   TROPONIN I   PROCALCITONIN   URINALYSIS MICROSCOPIC    Narrative:     Specimen Source->Urine   LACTIC ACID, PLASMA   POCT INFLUENZA A/B MOLECULAR     EKG Readings: (Independently Interpreted)   Initial Reading: No STEMI. Rhythm: Sinus Tachycardia. Heart Rate: 111. Ectopy: No Ectopy. Conduction: RBBB. ST Segments: Normal ST Segments. T Waves: Normal. Axis: Normal. Clinical Impression: Normal Sinus Rhythm     Imaging Results              X-Ray Chest AP Portable (In process)                      CT Head Without Contrast (Final result)  Result time 11/14/22 22:23:39      Final result by Jaime Borjas MD (11/14/22 22:23:39)                   Impression:      No acute intracranial CT abnormality.    Patchy paranasal sinus disease.    All CT scans at this facility are performed  using dose modulation techniques as appropriate to performed exam including the following:  automated  exposure control; adjustment of mA and/or kV according to the patients size (this includes techniques or standardized protocols for targeted exams where dose is matched to indication/reason for exam: i.e. extremities or head);  iterative reconstruction technique.      Electronically signed by: Jaime Borjas  Date:    11/14/2022  Time:    22:23               Narrative:    EXAMINATION:  CT HEAD WITHOUT CONTRAST    CLINICAL HISTORY:  Headache, new or worsening (Age >= 50y); Headache, unspecified    TECHNIQUE:  Low dose axial CT images obtained throughout the head without intravenous contrast. Sagittal and coronal reconstructions were performed.    COMPARISON:  None.    FINDINGS:  Intracranial compartment:    Ventricles and sulci are normal in size for age without evidence of hydrocephalus. No extra-axial blood or fluid collections.    Mild microvascular ischemic changes.  No parenchymal mass, hemorrhage, edema or major vascular distribution infarct.    Skull/extracranial contents (limited evaluation): No fracture. Patchy opacification of the paranasal sinuses.  Mastoid air cells are clear.                                       X-Ray Chest AP Portable (Final result)  Result time 11/14/22 22:24:02      Final result by Jaime Borjas MD (11/14/22 22:24:02)                   Impression:      No acute abnormality.      Electronically signed by: Jaime Borjas  Date:    11/14/2022  Time:    22:24               Narrative:    EXAMINATION:  XR CHEST AP PORTABLE    CLINICAL HISTORY:  Sepsis;    TECHNIQUE:  Single frontal view of the chest was performed.    COMPARISON:  Multiple priors.    FINDINGS:  The lungs are clear, with normal appearance of pulmonary vasculature and no pleural effusion or pneumothorax.    The cardiac silhouette is upper limit of normal.  The hilar and mediastinal contours are unremarkable.    Bones are intact.                                       Medications   NORepinephrine 4 mg in dextrose 5% 250 mL  infusion (premix) (titrating) (0.1 mcg/kg/min × 127 kg Intravenous New Bag 11/15/22 0319)   lactated ringers bolus 2,754 mL (0 mLs Intravenous Stopped 11/15/22 0055)                    Vitals:    11/14/22 2132 11/14/22 2134 11/14/22 2205 11/14/22 2206   BP: (!) 78/47  (!) 72/50 (!) 82/53   Pulse:  108     Resp:       Temp:       TempSrc:       SpO2: (!) 94%      Weight:        11/14/22 2207 11/14/22 2302 11/14/22 2342 11/15/22 0002   BP: (!) 78/52 (!) 85/56 (!) 91/59 92/61   Pulse:  94 100 101   Resp:  (!) 28 (!) 25 (!) 22   Temp:       TempSrc:       SpO2:   97% 100%   Weight:        11/15/22 0022 11/15/22 0052 11/15/22 0102 11/15/22 0122   BP: 93/64 103/62 105/61 (!) 89/55   Pulse: 100 100 101 101   Resp: (!) 21 (!) 24 (!) 27 (!) 30   Temp:       TempSrc:       SpO2: 100% 100% 100% (!) 93%   Weight:        11/15/22 0242 11/15/22 0322 11/15/22 0328   BP: (!) 88/51 (!) 90/55 124/61   Pulse: 104 103 93   Resp: 20 (!) 24 (!) 24   Temp:      TempSrc:      SpO2: 96% 97% 100%   Weight:          Results for orders placed or performed during the hospital encounter of 11/14/22   CBC auto differential   Result Value Ref Range    WBC 10.00 3.90 - 12.70 K/uL    RBC 4.04 (L) 4.60 - 6.20 M/uL    Hemoglobin 11.1 (L) 14.0 - 18.0 g/dL    Hematocrit 34.8 (L) 40.0 - 54.0 %    MCV 86 82 - 98 fL    MCH 27.5 27.0 - 31.0 pg    MCHC 31.9 (L) 32.0 - 36.0 g/dL    RDW 16.8 (H) 11.5 - 14.5 %    Platelets 194 150 - 450 K/uL    MPV 10.4 9.2 - 12.9 fL    Immature Granulocytes 0.8 (H) 0.0 - 0.5 %    Gran # (ANC) 7.7 1.8 - 7.7 K/uL    Immature Grans (Abs) 0.08 (H) 0.00 - 0.04 K/uL    Lymph # 1.2 1.0 - 4.8 K/uL    Mono # 0.9 0.3 - 1.0 K/uL    Eos # 0.1 0.0 - 0.5 K/uL    Baso # 0.04 0.00 - 0.20 K/uL    nRBC 0 0 /100 WBC    Gran % 77.3 (H) 38.0 - 73.0 %    Lymph % 12.0 (L) 18.0 - 48.0 %    Mono % 8.5 4.0 - 15.0 %    Eosinophil % 1.0 0.0 - 8.0 %    Basophil % 0.4 0.0 - 1.9 %    Differential Method Automated    Comprehensive metabolic panel   Result  Value Ref Range    Sodium 136 136 - 145 mmol/L    Potassium 4.6 3.5 - 5.1 mmol/L    Chloride 103 95 - 110 mmol/L    CO2 23 23 - 29 mmol/L    Glucose 89 70 - 110 mg/dL    BUN 43 (H) 8 - 23 mg/dL    Creatinine 1.7 (H) 0.5 - 1.4 mg/dL    Calcium 8.3 (L) 8.7 - 10.5 mg/dL    Total Protein 5.8 (L) 6.0 - 8.4 g/dL    Albumin 2.7 (L) 3.5 - 5.2 g/dL    Total Bilirubin 0.3 0.1 - 1.0 mg/dL    Alkaline Phosphatase 91 55 - 135 U/L    AST 10 10 - 40 U/L    ALT 10 10 - 44 U/L    Anion Gap 10 8 - 16 mmol/L    eGFR 42.6 (A) >60 mL/min/1.73 m^2   Lactic acid, plasma #1   Result Value Ref Range    Lactate (Lactic Acid) 1.8 0.5 - 2.2 mmol/L   Urinalysis, Reflex to Urine Culture Urine, Clean Catch    Specimen: Urine   Result Value Ref Range    Specimen UA Urine, Clean Catch     Color, UA Yellow Yellow, Straw, Luiza    Appearance, UA Clear Clear    pH, UA 6.0 5.0 - 8.0    Specific Gravity, UA 1.005 1.005 - 1.030    Protein, UA Negative Negative    Glucose, UA Negative Negative    Ketones, UA Negative Negative    Bilirubin (UA) Negative Negative    Occult Blood UA Trace (A) Negative    Nitrite, UA Negative Negative    Urobilinogen, UA Negative <2.0 EU/dL    Leukocytes, UA Trace (A) Negative   Magnesium   Result Value Ref Range    Magnesium 1.8 1.6 - 2.6 mg/dL   Phosphorus   Result Value Ref Range    Phosphorus 2.7 2.7 - 4.5 mg/dL   Protime-INR   Result Value Ref Range    Prothrombin Time 10.9 9.0 - 12.5 sec    INR 1.0 0.8 - 1.2   Brain natriuretic peptide   Result Value Ref Range    BNP 22 0 - 99 pg/mL   Lipase   Result Value Ref Range    Lipase 16 4 - 60 U/L   Troponin I   Result Value Ref Range    Troponin I 0.012 0.000 - 0.026 ng/mL   Procalcitonin   Result Value Ref Range    Procalcitonin 0.14 <0.25 ng/mL   Urinalysis Microscopic   Result Value Ref Range    RBC, UA 3 0 - 4 /hpf    WBC, UA 1 0 - 5 /hpf    Bacteria Rare None-Occ /hpf    Microscopic Comment SEE COMMENT    Lactic acid, plasma #2   Result Value Ref Range    Lactate (Lactic  Acid) 1.1 0.5 - 2.2 mmol/L   Occult blood x 1, stool    Specimen: Stool   Result Value Ref Range    Occult Blood Positive (A) Negative   POCT Influenza A/B Molecular   Result Value Ref Range    POC Molecular Influenza A Ag Negative Negative, Not Reported    POC Molecular Influenza B Ag Negative Negative, Not Reported     Acceptable Yes          Imaging Results              X-Ray Chest AP Portable (In process)                      CT Head Without Contrast (Final result)  Result time 11/14/22 22:23:39      Final result by Jaime Borjas MD (11/14/22 22:23:39)                   Impression:      No acute intracranial CT abnormality.    Patchy paranasal sinus disease.    All CT scans at this facility are performed  using dose modulation techniques as appropriate to performed exam including the following:  automated exposure control; adjustment of mA and/or kV according to the patients size (this includes techniques or standardized protocols for targeted exams where dose is matched to indication/reason for exam: i.e. extremities or head);  iterative reconstruction technique.      Electronically signed by: Jaime Borjas  Date:    11/14/2022  Time:    22:23               Narrative:    EXAMINATION:  CT HEAD WITHOUT CONTRAST    CLINICAL HISTORY:  Headache, new or worsening (Age >= 50y); Headache, unspecified    TECHNIQUE:  Low dose axial CT images obtained throughout the head without intravenous contrast. Sagittal and coronal reconstructions were performed.    COMPARISON:  None.    FINDINGS:  Intracranial compartment:    Ventricles and sulci are normal in size for age without evidence of hydrocephalus. No extra-axial blood or fluid collections.    Mild microvascular ischemic changes.  No parenchymal mass, hemorrhage, edema or major vascular distribution infarct.    Skull/extracranial contents (limited evaluation): No fracture. Patchy opacification of the paranasal sinuses.  Mastoid air cells are clear.                                        X-Ray Chest AP Portable (Final result)  Result time 11/14/22 22:24:02      Final result by Jaime Borjas MD (11/14/22 22:24:02)                   Impression:      No acute abnormality.      Electronically signed by: Jaime Borjas  Date:    11/14/2022  Time:    22:24               Narrative:    EXAMINATION:  XR CHEST AP PORTABLE    CLINICAL HISTORY:  Sepsis;    TECHNIQUE:  Single frontal view of the chest was performed.    COMPARISON:  Multiple priors.    FINDINGS:  The lungs are clear, with normal appearance of pulmonary vasculature and no pleural effusion or pneumothorax.    The cardiac silhouette is upper limit of normal.  The hilar and mediastinal contours are unremarkable.    Bones are intact.                                      Medications   NORepinephrine 4 mg in dextrose 5% 250 mL infusion (premix) (titrating) (0.1 mcg/kg/min × 127 kg Intravenous New Bag 11/15/22 0319)   lactated ringers bolus 2,754 mL (0 mLs Intravenous Stopped 11/15/22 0055)         12:11 AM  - Re-evaluation:  The patient is resting comfortably and is in no acute distress. Discussed test results and notified of pending labs. Answered questions at this time.     1:44 AM Consult: Dr. Howard discussed the case with Dr. Day (cardiology). Agrees with current management. Recommends admission ICU and will see pt in consult.     1:44 AM - CONSULT: Dr. Howard discussed the case with Dr. Allen (). Agrees with current management. Recommends admission to ICU and will see pt in hospital room.  Admitting Service: hospital medicine   Admitting Physician: Dr. Bernabe   Admit to in ICU    1:44 AM - Re-evaluation:  Discussed test results, shared treatment plan, and the need for admission with patient and family. They understand and agree to the plan as discussed. Answered questions at this time.     All historical, clinical, radiographic, and laboratory findings were reviewed with the patient/family in  detail along with the indications for transport to the facility in [place] in order to receive further evaluation and treatment for hypotension and new RBBB.  All remaining questions and concerns were addressed at this time and the patient/family communicates understanding and agrees to proceed accordingly.  Similarly, all pertinent details of the encounter were discussed with Dr. Allen who agrees to receive the patient at [facility] for further care as outlined above.  The patient will be transferred by [facility] ambulance services secondary to a need for ongoing levophed drip, cardiac monitoring, pulse ox en route.  Blaise Howard MD  1:44 AM    3:21 AM - no beds available in ICU.  Will transfer ED to ED to hold at [facility] until bed is available so HM and cardiology can evaluate. Dr. Patino notified in [facility].           Medication List        ASK your doctor about these medications      acetaminophen 500 MG tablet  Commonly known as: TYLENOL     * albuterol 5 mg/mL nebulizer solution  Commonly known as: PROVENTIL     * albuterol 90 mcg/actuation inhaler  Commonly known as: PROVENTIL/VENTOLIN HFA     aspirin 81 MG Chew     budesonide-formoterol 160-4.5 mcg 160-4.5 mcg/actuation Hfaa  Commonly known as: SYMBICORT     bumetanide 1 MG tablet  Commonly known as: BUMEX  Take 1 tablet (1 mg total) by mouth 2 (two) times daily.     clopidogreL 75 mg tablet  Commonly known as: PLAVIX     cyanocobalamin 1000 MCG tablet  Commonly known as: VITAMIN B-12     ergocalciferol 50,000 unit Cap  Commonly known as: ERGOCALCIFEROL     EScitalopram oxalate 5 MG Tab  Commonly known as: LEXAPRO     ferrous sulfate 325 (65 FE) MG EC tablet     LIDOcaine 5 %  Commonly known as: LIDODERM     metFORMIN 500 MG (MOD) 24hr tablet  Commonly known as: GLUMETZA     metOLazone 5 MG tablet  Commonly known as: ZAROXOLYN  Take 1 tablet (5 mg total) by mouth daily as needed (scrotal swelling).     metoprolol succinate 25 MG 24 hr  tablet  Commonly known as: TOPROL-XL  Take 1 tablet (25 mg total) by mouth once daily.     sacubitriL-valsartan 24-26 mg per tablet  Commonly known as: ENTRESTO     tiotropium 18 mcg inhalation capsule  Commonly known as: SPIRIVA           * This list has 2 medication(s) that are the same as other medications prescribed for you. Read the directions carefully, and ask your doctor or other care provider to review them with you.                 Current Discharge Medication List            ED Diagnosis  1. Hypotension, unspecified hypotension type    2. Headache    3. New onset right bundle branch block (RBBB)    4. Encounter for central line placement    5. Blood in stool                  Clinical Impression:   Final diagnoses:  [R51.9] Headache  [I95.9] Hypotension, unspecified hypotension type (Primary)  [I45.10] New onset right bundle branch block (RBBB)  [Z45.2] Encounter for central line placement  [K92.1] Blood in stool      ED Disposition Condition    Admit Stable                Blaise Howrad Jr., MD  11/15/22 9336

## 2022-11-15 NOTE — ASSESSMENT & PLAN NOTE
-Appears compensated at this time  -Hold home Bumex Entresto due to hypotension  -Daily weights  -Strict I&Os

## 2022-11-16 PROBLEM — K92.1 MELENA: Status: RESOLVED | Noted: 2022-01-01 | Resolved: 2022-01-01

## 2022-11-16 PROBLEM — K92.2 UPPER GI BLEED: Status: ACTIVE | Noted: 2022-01-01

## 2022-11-16 PROBLEM — I95.9 HYPOTENSION: Status: RESOLVED | Noted: 2022-01-01 | Resolved: 2022-01-01

## 2022-11-16 NOTE — ASSESSMENT & PLAN NOTE
"MY TREATMENT INFORMATION FOR SLEEP APNEA-  Erika Reina    DOCTOR : Bennett Ezra Goltz, PA-C  SLEEP CENTER : [place]     MY CONTACT NUMBER: [phone]    Am I having a sleep study at a sleep center?  Make sure you have an appointment for the study before you leave!    Am I having a home sleep study?  Watch this video:  [email or web address].com/watch?v=[email or web address]_[email or web address]&[email or web address]  Please verify your insurance coverage with your insurance carrier    Frequently asked questions:  1. What is Obstructive Sleep Apnea (ENRICO)? ENRICO is the most common type of sleep apnea. Apnea means, \"without breath.\"  Apnea is most often caused by narrowing or collapse of the upper airway as muscles relax during sleep.   Almost everyone has occasional apneas. Most people with sleep apnea have had brief interruptions at night frequently for many years.  The severity of sleep apnea is related to how frequent and severe the events are.   2. What are the consequences of ENRICO? Symptoms include: feeling sleepy during the day, snoring loudly, gasping or stopping of breathing, trouble sleeping, and occasionally morning headaches or heartburn at night.  Sleepiness can be serious and even increase the risk of falling asleep while driving. Other health consequences may include development of high blood pressure and other cardiovascular disease in persons who are susceptible. Untreated ENRICO  can contribute to heart disease, stroke and diabetes.   3. What are the treatment options? In most situations, sleep apnea is a lifelong disease that must be managed with daily therapy. Medications are not effective for sleep apnea and surgery is generally not considered until other therapies have been tried. Your treatment is your choice . Continuous Positive Airway (CPAP) works right away and is the therapy that is effective in nearly everyone. An oral device to hold your jaw forward is usually the next most reliable option. 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 CHF is currently controlled. Latest ECHO performed and demonstrates- Results for orders placed during the hospital encounter of 11/14/22    Echo    Interpretation Summary  · Concentric hypertrophy and moderately decreased systolic function.  · The estimated ejection fraction is 35%.  · Grade I left ventricular diastolic dysfunction.  · There is mild-to-moderate aortic valve stenosis.  · Aortic valve area is 1.20 cm2; peak velocity is 2.89 m/s; mean gradient is 25 mmHg.  · Mild tricuspid regurgitation.       options include postioning devices (to keep you off your back), weight loss, and surgery including a tongue pacing device. There is more detail about some of these options below.    Important tips for using CPAP and similar devices   Know your equipment:  CPAP is continuous positive airway pressure that prevents obstructive sleep apnea by keeping the throat from collapsing while you are sleeping. In most cases, the device is  smart  and can slowly self-adjusts if your throat collapses and keeps a record every day of how well you are treated-this information is available to you and your care team.  BPAP is bilevel positive airway pressure that keeps your throat open and also assists each breath with a pressure boost to maintain adequate breathing.  Special kinds of BPAP are used in patients who have inadequate breathing from lung or heart disease. In most cases, the device is  smart  and can slowly self-adjusts to assist breathing. Like CPAP, the device keeps a record of how well you are treated.  Your mask is your connection to the device. You get to choose what feels most comfortable and the staff will help to make sure if fits. Here: are some examples of the different masks that are available:       Key points to remember on your journey with sleep apnea:  1. Sleep study.  PAP devices often need to be adjusted during a sleep study to show that they are effective and adjusted right.  2. Good tips to remember: Try wearing just the mask during a quiet time during the day so your body adapts to wearing it. A humidifier is recommended for comfort in most cases to prevent drying of your nose and throat. Allergy medication from your provider may help you if you are having nasal congestion.  3. Getting settled-in. It takes more than one night for most of us to get used to wearing a mask. Try wearing just the mask during a quiet time during the day so your body adapts to wearing it. A humidifier is recommended for comfort  in most cases. Our team will work with you carefully on the first day and will be in contact within 4 days and again at 2 and 4 weeks for advice and remote device adjustments. Your therapy is evaluated by the device each day.   4. Use it every night. The more you are able to sleep naturally for 7-8 hours, the more likely you will have good sleep and to prevent health risks or symptoms from sleep apnea. Even if you use it 4 hours it helps. Occasionally all of us are unable to use a medical therapy, in sleep apnea, it is not dangerous to miss one night.   5. Communicate. Call our skilled team on the number provided on the first day if your visit for problems that make it difficult to wear the device. Over 2 out of 3 patients can learn to wear the device long-term with help from our team. Remember to call our team or your sleep providers if you are unable to wear the device as we may have other solutions for those who cannot adapt to mask CPAP therapy. It is recommended that you sleep your sleep provider within the first 3 months and yearly after that if you are not having problems.   6. Use it for your health. We encourage use of CPAP masks during daytime quiet periods to allow your face and brain to adapt to the sensation of CPAP so that it will be a more natural sensation to awaken to at night or during naps. This can be very useful during the first few weeks or months of adapting to CPAP though it does not help medically to wear CPAP during wakefulness and  should not be used as a strategy just to meet guidelines.  7. Take care of your equipment. Make sure you clean your mask and tubing using directions every day and that your filter and mask are replaced as recommended or if they are not working.     BESIDES CPAP, WHAT OTHER THERAPIES ARE THERE?    Positioning Device  Positioning devices are generally used when sleep apnea is mild and only occurs on your back.This example shows a pillow that straps around the  waist. It may be appropriate for those whose sleep study shows milder sleep apnea that occurs primarily when lying flat on one's back. Preliminary studies have shown benefit but effectiveness at home may need to be verified by a home sleep test. These devices are generally not covered by medical insurance.  Examples of devices that maintain sleeping on the back to prevent snoring and mild sleep apnea.    Belt type body positioner  Http://Compufirst.FSI/    Electronic reminder  Http://nightshifttherapy.com/  Http://www.InstyBook.FSI.au/      Oral Appliance  What is oral appliance therapy?  An oral appliance device fits on your teeth at night like a retainer used after having braces. The device is made by a specialized dentist and requires several visits over 1-2 months before a manufactured device is made to fit your teeth and is adjusted to prevent your sleep apnea. Once an oral device is working properly, snoring should be improved. A home sleep test may be recommended at that time if to determine whether the sleep apnea is adequately treated.       Some things to remember:  -Oral devices are often, but not always, covered by your medical insurance. Be sure to check with your insurance provider.   -If you are referred for oral therapy, you will be given a list of specialized dentists to consider or you may choose to visit the Web site of the American Academy of Dental Sleep Medicine  -Oral devices are less likely to work if you have severe sleep apnea or are extremely overweight.     More detailed information  An oral appliance is a small acrylic device that fits over the upper and lower teeth  (similar to a retainer or a mouth guard). This device slightly moves jaw forward, which moves the base of the tongue forward, opens the airway, improves breathing for effective treat snoring and obstructive sleep apnea in perhaps 7 out of 10 people .  The best working devices are custom-made by a dental device  after  a mold is made of the teeth 1, 2, 3.  When is an oral appliance indicated?  Oral appliance therapy is recommended as a first-line treatment for patients with primary snoring, mild sleep apnea, and for patients with moderate sleep apnea who prefer appliance therapy to use of CPAP4, 5. Severity of sleep apnea is determined by sleep testing and is based on the number of respiratory events per hour of sleep.   How successful is oral appliance therapy?  The success rate of oral appliance therapy in patients with mild sleep apnea is 75-80% while in patients with moderate sleep apnea it is 50-70%. The chance of success in patients with severe sleep apnea is 40-50%. The research also shows that oral appliances have a beneficial effect on the cardiovascular health of ENRICO patients at the same magnitude as CPAP therapy7.  Oral appliances should be a second-line treatment in cases of severe sleep apnea, but if not completely successful then a combination therapy utilizing CPAP plus oral appliance therapy may be effective. Oral appliances tend to be effective in a broad range of patients although studies show that the patients who have the highest success are females, younger patients, those with milder disease, and less severe obesity. 3, 6.   Finding a dentist that practices dental sleep medicine  Specific training is available through the American Academy of Dental Sleep Medicine for dentists interested in working in the field of sleep. To find a dentist who is educated in the field of sleep and the use of oral appliances, near you, visit the Web site of the American Academy of Dental Sleep Medicine.    References  1. Nedra, et al. Objectively measured vs self-reported compliance during oral appliance therapy for sleep-disordered breathing. Chest 2013; 144(5): 2510-5461.  2. Meera et al. Objective measurement of compliance during oral appliance therapy for sleep-disordered breathing. Thorax 2013; 68(1): 91-96.  3.  Angelita et al. Mandibular advancement devices in 620 men and women with ENRICO and snoring: tolerability and predictors of treatment success. Chest 2004; 125: 5553-7674.  4. Mirella et al. Oral appliances for snoring and ENRICO: a review. Sleep 2006; 29: 244-262.  5. Irving et al. Oral appliance treatment for ENRICO: an update. J Clin Sleep Med 2014; 10(2): 215-227.  6. Rory et al. Predictors of OSAH treatment outcome. J Dent Res 2007; 86: 5246-1996.    Weight Loss:    Weight loss is a long-term strategy that may improve sleep apnea in some patients.    Weight management is a personal decision and the decision should be based on your interest and the potential benefits.  If you are interested in exploring weight loss strategies, the following discussion covers the impact on weight loss on sleep apnea and the approaches that may be successful.    Being overweight does not necessarily mean you will have health consequences.  Those who have BMI over 35 or over 27 with existing medical conditions carries greater risk.   Weight loss decreases severity of sleep apnea in most people with obesity. For those with mild obesity who have developed snoring with weight gain, even 15-30 pound weight loss can improve and occasionally eliminate sleep apnea.  Structured and life-long dietary and health habits are necessary to lose weight and keep healthier weight levels.     Though there may be significant health benefits from weight loss, long-term weight loss is very difficult to achieve- studies show success with dietary management in less than 10% of people. In addition, substantial weight loss may require years of dietary control and may be difficult if patients have severe obesity. In these cases, surgical management may be considered.  Finally, older individuals who have tolerated obesity without health complications may be less likely to benefit from weight loss strategies.      Your BMI is Body mass index is 53.25  kg/m .  Weight management is a personal decision.  If you are interested in exploring weight loss strategies, the following discussion covers the approaches that may be successful. Body mass index (BMI) is one way to tell whether you are at a healthy weight, overweight, or obese. It measures your weight in relation to your height.  A BMI of 18.5 to 24.9 is in the healthy range. A person with a BMI of 25 to 29.9 is considered overweight, and someone with a BMI of 30 or greater is considered obese. More than two-thirds of American adults are considered overweight or obese.  Being overweight or obese increases the risk for further weight gain. Excess weight may lead to heart disease and diabetes.  Creating and following plans for healthy eating and physical activity may help you improve your health.  Weight control is part of healthy lifestyle and includes exercise, emotional health, and healthy eating habits. Careful eating habits lifelong are the mainstay of weight control. Though there are significant health benefits from weight loss, long-term weight loss with diet alone may be very difficult to achieve- studies show long-term success with dietary management in less than 10% of people. Attaining a healthy weight may be especially difficult to achieve in those with severe obesity. In some cases, medications, devices and surgical management might be considered.  What can you do?  If you are overweight or obese and are interested in methods for weight loss, you should discuss this with your provider.     Consider reducing daily calorie intake by 500 calories.     Keep a food journal.     Avoiding skipping meals, consider cutting portions instead.    Diet combined with exercise helps maintain muscle while optimizing fat loss. Strength training is particularly important for building and maintaining muscle mass. Exercise helps reduce stress, increase energy, and improves fitness. Increasing exercise without diet control,  however, may not burn enough calories to loose weight.       Start walking three days a week 10-20 minutes at a time    Work towards walking thirty minutes five days a week     Eventually, increase the speed of your walking for 1-2 minutes at time    In addition, we recommend that you review healthy lifestyles and methods for weight loss available through the National Institutes of Health patient information sites:  http://win.niddk.nih.gov/publications/index.htm    And look into health and wellness programs that may be available through your health insurance provider, employer, local community center, or danie club.    Weight management plan: continue working with weight loss clinic  Surgery:  Surgery for obstructive sleep apnea is considered generally only when other therapies fail to work. Surgery may be discussed with you if you are having a difficult time tolerating CPAP and or when there is an abnormal structure that requires surgical correction.  Nose and throat surgeries often enlarge the airway to prevent collapse.  Most of these surgeries create pain for 1-2 weeks and up to half of the most common surgeries are not effective throughout life.  You should carefully discuss the benefits and drawbacks to surgery with your sleep provider and surgeon to determine if it is the best solution for you.   More information  Surgery for ENRICO is directed at areas that are responsible for narrowing or complete obstruction of the airway during sleep.  There are a wide range of procedures available to enlarge and/or stabilize the airway to prevent blockage of breathing in the three major areas where it can occur: the palate, tongue, and nasal regions.  Successful surgical treatment depends on the accurate identification of the factors responsible for obstructive sleep apnea in each person.  A personalized approach is required because there is no single treatment that works well for everyone.  Because of anatomic variation,  consultation with an examination by a sleep surgeon is a critical first step in determining what surgical options are best for each patient.  In some cases, examination during sedation may be recommended in order to guide the selection of procedures.  Patients will be counseled about risks and benefits as well as the typical recovery course after surgery. Surgery is typically not a cure for a person s ENRICO.  However, surgery will often significantly improve one s ENRICO severity (termed  success rate ).  Even in the absence of a cure, surgery will decrease the cardiovascular risk associated with OSA7; improve overall quality of life8 (sleepiness, functionality, sleep quality, etc).  Palate Procedures:  Patients with ENRICO often have narrowing of their airway in the region of their tonsils and uvula.  The goals of palate procedures are to widen the airway in this region as well as to help the tissues resist collapse.  Modern palate procedure techniques focus on tissue conservation and soft tissue rearrangement, rather than tissue removal.  Often the uvula is preserved in this procedure. Residual sleep apnea is common in patient after pharyngoplasty with an average reduction in sleep apnea events of 33%2.    Tongue Procedures:  ExamWhile patients are awake, the muscles that surround the throat are active and keep this region open for breathing. These muscles relax during sleep, allowing the tongue and other structures to collapse and block breathing.  There are several different tongue procedures available.  Selection of a tongue base procedure depends on characteristics seen on physical exam.  Generally, procedures are aimed at removing bulky tissues in this area or preventing the back of the tongue from falling back during sleep.  Success rates for tongue surgery range from 50-62%3.  Hypoglossal Nerve Stimulation:  Hypoglossal nerve stimulation has recently received approval from the [place] Food and Drug  Administration for the treatment of obstructive sleep apnea.  This is based on research showing that the system was safe and effective in treating sleep apnea6.  Results showed that the median AHI score decreased 68%, from 29.3 to 9.0. This therapy uses an implant system that senses breathing patterns and delivers mild stimulation to airway muscles, which keeps the airway open during sleep.  The system consists of three fully implanted components: a small generator (similar in size to a pacemaker), a breathing sensor, and a stimulation lead.  Using a small handheld remote, a patient turns the therapy on before bed and off upon awakening.    Candidates for this device must be greater than 22 years of age, have moderate to severe ENRICO (AHI between 20-65), BMI less than 32, have tried CPAP/oral appliance without success, and have appropriate upper airway anatomy (determined by a sleep endoscopy performed by Dr. Narayan).  Hypoglossal Nerve Stimulation Pathway:    The sleep surgeon s office will work with the patient through the insurance prior-authorization process (including communications and appeals).    Nasal Procedures:  Nasal obstruction can interfere with nasal breathing during the day and night.  Studies have shown that relief of nasal obstruction can improve the ability of some patients to tolerate positive airway pressure therapy for obstructive sleep apnea1.  Treatment options include medications such as nasal saline, topical corticosteroid and antihistamine sprays, and oral medications such as antihistamines or decongestants. Non-surgical treatments can include external nasal dilators for selected patients. If these are not successful by themselves, surgery can improve the nasal airway either alone or in combination with these other options.  Combination Procedures:  Combination of surgical procedures and other treatments may be recommended, particularly if patients have more than one area of narrowing or  persistent positional disease.  The success rate of combination surgery ranges from 66-80%2,3.    References  1. Radha MOE. The Role of the Nose in Snoring and Obstructive Sleep Apnoea: An Update.  Eur Arch Otorhinolaryngol. 2011; 268: 1365-73.  2.  Salvatore SM; Carlos JA; Neno JR; Pallanch JF; Chandrika MB; Tiffany SG; Alina GRIGGS. Surgical modifications of the upper airway for obstructive sleep apnea in adults: a systematic review and meta-analysis. SLEEP 2010;33(10):6867-9098. Eitan CARRION. Hypopharyngeal surgery in obstructive sleep apnea: an evidence-based medicine review.  Arch Otolaryngol Head Neck Surg. 2006 Feb;132(2):206-13.  3. Jeremy YH1, Key Y, Kobe LIZBET. The efficacy of anatomically based multilevel surgery for obstructive sleep apnea. Otolaryngol Head Neck Surg. 2003 Oct;129(4):327-35.  4. Eitan CARRION, Goldberg A. Hypopharyngeal Surgery in Obstructive Sleep Apnea: An Evidence-Based Medicine Review. Arch Otolaryngol Head Neck Surg. 2006 Feb;132(2):206-13.  5. Swapnao PJ et al. Upper-Airway Stimulation for Obstructive Sleep Apnea.  N Engl J Med. 2014 Jan 9;370(2):139-49.  6. Anna Y et al. Increased Incidence of Cardiovascular Disease in Middle-aged Men with Obstructive Sleep Apnea. Am J Respir Crit Care Med; 2002 166: 159-165  7. Mj EM et al. Studying Life Effects and Effectiveness of Palatopharyngoplasty (SLEEP) study: Subjective Outcomes of Isolated Uvulopalatopharyngoplasty. Otolaryngol Head Neck Surg. 2011; 144: 623-631.

## 2022-11-16 NOTE — ASSESSMENT & PLAN NOTE
-Developed hematemesis after admission  -GI consulted  -NPO, may need endoscopy in the morning  -Monitor in ICU  -Low threshold for transfusion

## 2022-11-16 NOTE — NURSING
Pt transferred to Tele room 250. Tele box in place; VS stable. Receiving RN at bedside. Family updated.

## 2022-11-16 NOTE — ASSESSMENT & PLAN NOTE
-Follows with [facility], Dr. Wyman for Cardiology  -Hold home ASA & Plavix given acute GI bleed  -Hold home Bumex, Toprol due to hypotension  -Evaluated by Dr. Whyte in ED  -TTE showed LVEF 35% with grade I diastolic dysfunction

## 2022-11-16 NOTE — PROGRESS NOTES
[facility] ([facility])  Critical Care Medicine  Progress Note    Patient Name: Fransisco Durand  MRN: [id number]  Admission Date: 11/14/2022  Hospital Length of Stay: 1 days  Code Status: Full Code  Attending Provider: Matheus Hernandez MD  Primary Care Provider: Kiera Dhaliwal MD   Principal Problem: Acute blood loss anemia    Subjective:     HPI:  72-year-old male with a known past medical history of combined heart failure with the EF of 35% and grade 1 diastolic dysfunction, remote AICD which was removed secondary to infection, anemia, arthritis, CAD with PCI, CKD, COPD, diabetes mellitus type 2, hypertension, obesity and neuropathy that presented to an outside ER 11/14 with complaint of headache.  Patient was found to be hypotensive.  Ultimately, patient required insertion of right IJ central line initiation of Levophed.  Patient was transferred to [facility] for higher level of care.  Upon arrival to the ER in [place] he was weaned from his Levophed and admitted to the floor.  At that time, source of hypotension was unclear.  Shortly after admission, rapid response was called after patient had an episode of hematemesis and he was transferred to the ICU.  Upon further questioning.  Patient reports dark melanotic stool that he 1st noticed 11/13 or 11/14.  Repeat labs do show a drop in H&H, but blood pressure holding.  GI has been consulted for further evaluation. Plans for EGD 11/16.      Hospital/ICU Course:  11/15 - transferred to ICU after Rapid Response on the floor for hematemesis and melena, hypotension   11/16 - EGD today at bedside with GI, off pressors, no further melena reports, pending scope results possible transfer out of ICU      Interval History:     Review of Systems   Respiratory:  Negative for cough, shortness of breath and wheezing.    Cardiovascular:  Positive for leg swelling. Negative for chest pain.   Gastrointestinal:  Negative for blood in stool, melena, nausea and  vomiting.   All other systems reviewed and are negative.      Objective:     Vital Signs (Most Recent):  Temp: 97.9 °F (36.6 °C) (11/16/22 0701)  Pulse: 92 (11/16/22 1000)  Resp: 16 (11/16/22 1000)  BP: (!) 95/54 (11/16/22 1000)  SpO2: 97 % (11/16/22 1000)   Vital Signs (24h Range):  Temp:  [97.7 °F (36.5 °C)-99 °F (37.2 °C)] 97.9 °F (36.6 °C)  Pulse:  [] 92  Resp:  [14-33] 16  SpO2:  [89 %-100 %] 97 %  BP: ()/(51-84) 95/54     Weight: 126.1 kg (278 lb)  Body mass index is 42.27 kg/m².      Intake/Output Summary (Last 24 hours) at 11/16/2022 1036  Last data filed at 11/16/2022 0545  Gross per 24 hour   Intake 1686.66 ml   Output 1075 ml   Net 611.66 ml       Physical Exam  Constitutional:       General: He is awake. He is not in acute distress.     Appearance: He is well-developed. He is obese.      Comments: Chronically ill appearing male semi upright in bed on RA asking for something to eat and drink    Cardiovascular:      Rate and Rhythm: Normal rate.      Comments: Lymphedema to BLE with generalized edema   Pulmonary:      Effort: Pulmonary effort is normal.      Breath sounds: Decreased breath sounds present.   Abdominal:      General: There is no distension.      Palpations: Abdomen is soft.      Comments: obese   Musculoskeletal:      Comments: SAL   Skin:     General: Skin is warm and dry.   Neurological:      General: No focal deficit present.      Mental Status: He is alert.   Psychiatric:         Behavior: Behavior is cooperative.       Vents:  Oxygen Concentration (%): 21 (11/16/22 0138)    Lines/Drains/Airways       Central Venous Catheter Line  Duration             Percutaneous Central Line Insertion/Assessment - Triple Lumen  11/15/22 0300 right internal jugular 1 day              Drain  Duration             Male External Urinary Catheter 11/15/22 1945 Other (Comment) <1 day              Peripheral Intravenous Line  Duration                  Peripheral IV - Single Lumen 11/14/22 2128 18 G  Right Antecubital 1 day                    Significant Labs:    CBC/Anemia Profile:  Recent Labs   Lab 11/15/22  0206 11/15/22  1526 11/15/22  1809 11/16/22  0331   WBC  --  10.06 12.70 11.75   HGB  --  8.9* 7.2* 9.0*   HCT  --  28.2* 22.9* 28.2*   PLT  --  183 131* 147*   MCV  --  86 88 90   RDW  --  17.0* 17.2* 15.9*   OCCULTBLOOD Positive*  --   --   --         Chemistries:  Recent Labs   Lab 11/14/22  2130 11/15/22  1526 11/16/22  0331    138 140   K 4.6 4.6 4.6    105 109   CO2 23 28 25   BUN 43* 38* 38*   CREATININE 1.7* 1.2 1.1   CALCIUM 8.3* 8.3* 7.5*   ALBUMIN 2.7*  --   --    PROT 5.8*  --   --    BILITOT 0.3  --   --    ALKPHOS 91  --   --    ALT 10  --   --    AST 10  --   --    MG 1.8  --   --    PHOS 2.7  --   --        All pertinent labs within the past 24 hours have been reviewed.    Significant Imaging:  I have reviewed all pertinent imaging results/findings within the past 24 hours.      ABG  No results for input(s): PH, PO2, PCO2, HCO3, BE in the last 168 hours.  Assessment/Plan:     Pulmonary  COPD (chronic obstructive pulmonary disease)  -Reports he is in smoking cessation treatment currently, encourage continued cessation   -will provide p.r.n. nebs for wheezing and nicotine patch if desired     Cardiac/Vascular  HLD (hyperlipidemia)  See heart failure      CAD (coronary artery disease)  See heart failure      Primary hypertension  See heart failure    Chronic combined systolic and diastolic heart failure  -off levophed with acceptable hemodynamics   -hold antiplatelets secondary to GI bleeding, resume when okay with GI pending EGD findings   continue to hold blood pressure medications, resume as clinically indicated   -monitor telemetry  -echo reviewed (EF 35% with grade I diastolic dysfunction), appears compensated currently, may need prn diuresis -- follow closely       Hypotension  See acute blood loss anemia, hold any antihypertensives     Renal/  CKD (chronic kidney disease)  stage 3, GFR 30-59 ml/min  -creatinine trends improved / stable  -monitor urine output, renal function panels, and electrolytes    Oncology  * Acute blood loss anemia  - GI consulted, plans for EGD today   -continue Protonix IV twice a day  -NPO  -p.r.n. Zofran  -  Blood counts stable after transfusion overnight, monitor     Endocrine  Type 2 diabetes mellitus, without long-term current use of insulin  -SSI    GI  Hematemesis without nausea  See acute blood loss anemia, no further episodes reported        Melena  See acute blood loss anemia, no further episodes reported overnight           Pt remains off levo with stable hemodynamics and no further bleeding. EGD today; pending findings may be able to transfer out of the ICU to the floor today. At time of transfer, critical care team will sign off. Please call if condition changes and warrants reevaluation.        Siva Davis, NP  Critical Care Medicine  [facility] - Intensive Care ([facility])

## 2022-11-16 NOTE — ASSESSMENT & PLAN NOTE
-Reports he is in smoking cessation treatment currently, encourage continued cessation   -will provide p.r.n. nebs for wheezing and nicotine patch if desired

## 2022-11-16 NOTE — SUBJECTIVE & OBJECTIVE
Interval History:     Review of Systems   Respiratory:  Negative for cough, shortness of breath and wheezing.    Cardiovascular:  Positive for leg swelling. Negative for chest pain.   Gastrointestinal:  Negative for blood in stool, melena, nausea and vomiting.   All other systems reviewed and are negative.      Objective:     Vital Signs (Most Recent):  Temp: 97.9 °F (36.6 °C) (11/16/22 0701)  Pulse: 92 (11/16/22 1000)  Resp: 16 (11/16/22 1000)  BP: (!) 95/54 (11/16/22 1000)  SpO2: 97 % (11/16/22 1000)   Vital Signs (24h Range):  Temp:  [97.7 °F (36.5 °C)-99 °F (37.2 °C)] 97.9 °F (36.6 °C)  Pulse:  [] 92  Resp:  [14-33] 16  SpO2:  [89 %-100 %] 97 %  BP: ()/(51-84) 95/54     Weight: 126.1 kg (278 lb)  Body mass index is 42.27 kg/m².      Intake/Output Summary (Last 24 hours) at 11/16/2022 1036  Last data filed at 11/16/2022 0545  Gross per 24 hour   Intake 1686.66 ml   Output 1075 ml   Net 611.66 ml       Physical Exam  Constitutional:       General: He is awake. He is not in acute distress.     Appearance: He is well-developed. He is obese.      Comments: Chronically ill appearing male semi upright in bed on RA asking for something to eat and drink    Cardiovascular:      Rate and Rhythm: Normal rate.      Comments: Lymphedema to BLE with generalized edema   Pulmonary:      Effort: Pulmonary effort is normal.      Breath sounds: Decreased breath sounds present.   Abdominal:      General: There is no distension.      Palpations: Abdomen is soft.      Comments: obese   Musculoskeletal:      Comments: SAL   Skin:     General: Skin is warm and dry.   Neurological:      General: No focal deficit present.      Mental Status: He is alert.   Psychiatric:         Behavior: Behavior is cooperative.       Vents:  Oxygen Concentration (%): 21 (11/16/22 0138)    Lines/Drains/Airways       Central Venous Catheter Line  Duration             Percutaneous Central Line Insertion/Assessment - Triple Lumen  11/15/22 0300  right internal jugular 1 day              Drain  Duration             Male External Urinary Catheter 11/15/22 1945 Other (Comment) <1 day              Peripheral Intravenous Line  Duration                  Peripheral IV - Single Lumen 11/14/22 2128 18 G Right Antecubital 1 day                    Significant Labs:    CBC/Anemia Profile:  Recent Labs   Lab 11/15/22  0206 11/15/22  1526 11/15/22  1809 11/16/22  0331   WBC  --  10.06 12.70 11.75   HGB  --  8.9* 7.2* 9.0*   HCT  --  28.2* 22.9* 28.2*   PLT  --  183 131* 147*   MCV  --  86 88 90   RDW  --  17.0* 17.2* 15.9*   OCCULTBLOOD Positive*  --   --   --         Chemistries:  Recent Labs   Lab 11/14/22  2130 11/15/22  1526 11/16/22  0331    138 140   K 4.6 4.6 4.6    105 109   CO2 23 28 25   BUN 43* 38* 38*   CREATININE 1.7* 1.2 1.1   CALCIUM 8.3* 8.3* 7.5*   ALBUMIN 2.7*  --   --    PROT 5.8*  --   --    BILITOT 0.3  --   --    ALKPHOS 91  --   --    ALT 10  --   --    AST 10  --   --    MG 1.8  --   --    PHOS 2.7  --   --        All pertinent labs within the past 24 hours have been reviewed.    Significant Imaging:  I have reviewed all pertinent imaging results/findings within the past 24 hours.

## 2022-11-16 NOTE — PLAN OF CARE
[facility] - [facility] (Hospital)  Initial Discharge Assessment       Primary Care Provider: [facility]    Admission Diagnosis: Blood in stool [K92.1]  CHF (congestive heart failure) [I50.9]  Encounter for central line placement [Z45.2]  RBBB [I45.10]  Chest pain [R07.9]  Headache [R51.9]  Hypotension, unspecified hypotension type [I95.9]  New onset right bundle branch block (RBBB) [I45.10]    Admission Date: 11/14/2022  Expected Discharge Date:     Discharge Barriers Identified: None    Payor: MEDICARE / Plan: MEDICARE A ONLY / Product Type: Government /     Extended Emergency Contact Information  Primary Emergency Contact: Fransisco Durand II   [place]  Mobile Phone: [phone]  Relation: Son    Discharge Plan A: Home  Discharge Plan B: Home Health      [facility] - [place], [place] - [address] [address]  [address] [address]  Phone: [phone] Fax: [phone]      Initial Assessment (most recent)       Adult Discharge Assessment - 11/16/22 1329          Discharge Assessment    Assessment Type --     Confirmed/corrected address, phone number and insurance Yes     Confirmed Demographics Correct on Facesheet     Source of Information patient     If unable to respond/provide information was family/caregiver contacted? Yes     Communicated GITA with patient/caregiver Yes     Lives With alone     Facility Arrived From: home     Do you expect to return to your current living situation? Yes     Do you have help at home or someone to help you manage your care at home? Yes     Who are your caregiver(s) and their phone number(s)? family     Prior to hospitilization cognitive status: Alert/Oriented     Current cognitive status: Alert/Oriented     Walking or Climbing Stairs Difficulty none     Dressing/Bathing Difficulty none     Home Accessibility wheelchair accessible     Home Layout Able to live on 1st floor     Equipment Currently Used at Home cane, straight;CPAP      Readmission within 30 days? No     Patient currently being followed by outpatient case management? No     Do you currently have service(s) that help you manage your care at home? Yes     How Many hours does patient receive services 9     Name and Contact number of [facility]     Is the pt/caregiver preference to resume services with current agency Yes     Do you take prescription medications? Yes     Do you have prescription coverage? Yes     Coverage [facility]     Do you have any problems affording any of your prescribed medications? No     Is the patient taking medications as prescribed? yes     Who is going to help you get home at discharge? transportation services     How do you get to doctors appointments? health plan transportation;family or friend will provide     Are you on dialysis? No     Do you take coumadin? No     Discharge Plan A Home     Discharge Plan B Home Health     DME Needed Upon Discharge  cane, straight;CPAP     Discharge Plan discussed with: Patient     Discharge Barriers Identified None                   Anticipated DC dispo:   Prior Level of Function:   PCP:     Comments:  CM met with patient at bedside to introduce role and discuss discharge planning. Patient lives alone with family available  who will also be help at home and can provide transport at time of discharge. CM discharge needs depends on hospital progress. CM will continue following to assist with other needs.

## 2022-11-16 NOTE — [employer] PATIENT INSTRUCTIONS
Discharge Summary/Instructions after an Endoscopic Procedure  Patient Name: Fransisco Durand  Patient MRN: [id number]  Patient YOB: 1950  Wednesday, November 16, 2022 Lyndsay Whitten MD  Dear patient,  As a result of recent federal legislation (The Federal Cures Act), you may   receive lab or pathology results from your procedure in your [facility]   account before your physician is able to contact you. Your physician or   their representative will relay the results to you with their   recommendations at their soonest availability.  Thank you,  RESTRICTIONS:  During your procedure today, you received medications for sedation.  These   medications may affect your judgment, balance and coordination.  Therefore,   for 24 hours, you have the following restrictions:   - DO NOT drive a car, operate machinery, make legal/financial decisions,   sign important papers or drink alcohol.    ACTIVITY:  Today: no heavy lifting, straining or running due to procedural   sedation/anesthesia.  The following day: return to full activity including work.  DIET:  Eat and drink normally unless instructed otherwise.     TREATMENT FOR COMMON SIDE EFFECTS:  - Mild abdominal pain, nausea, belching, bloating or excessive gas:  rest,   eat lightly and use a heating pad.  - Sore Throat: treat with throat lozenges and/or gargle with warm salt   water.  - Because air was used during the procedure, expelling large amounts of air   from your rectum or belching is normal.  - If a bowel prep was taken, you may not have a bowel movement for 1-3 days.    This is normal.  SYMPTOMS TO WATCH FOR AND REPORT TO YOUR PHYSICIAN:  1. Abdominal pain or bloating, other than gas cramps.  2. Chest pain.  3. Back pain.  4. Signs of infection such as: chills or fever occurring within 24 hours   after the procedure.  5. Rectal bleeding, which would show as bright red, maroon, or black stools.   (A tablespoon of blood from the rectum is not serious, especially  if   hemorrhoids are present.)  6. Vomiting.  7. Weakness or dizziness.  GO DIRECTLY TO THE NEAREST EMERGENCY ROOM IF YOU HAVE ANY OF THE FOLLOWING:      Difficulty breathing              Chills and/or fever over 101 F   Persistent vomiting and/or vomiting blood   Severe abdominal pain   Severe chest pain   Black, tarry stools   Bleeding- more than one tablespoon   Any other symptom or condition that you feel may need urgent attention  Your doctor recommends these additional instructions:  If any biopsies were taken, your doctors clinic will contact you in 1 to 2   weeks with any results.  - Discharge patient to home (via wheelchair).   - Clear liquid diet.   - Continue present medications.   - Monitor H&H and transfuse as needed  If bleeding continues or recurs, consider re-look endoscopy after patient   has been off of Plavix for the full 5-7 days  For questions, problems or results please call your physician Lyndsay Whitten MD at Work:  [phone]  If you have any questions about the above instructions, call the GI   department at [phone] or call the endoscopy unit at [phone]   from 7am until 3 pm.  [facility], EMERGENCY ROOM PHONE NUMBER:   [phone]  IF A COMPLICATION OR EMERGENCY SITUATION ARISES AND YOU ARE UNABLE TO REACH   YOUR PHYSICIAN - GO DIRECTLY TO THE EMERGENCY ROOM.  I have read or have had read to me these discharge instructions for my   procedure and have received a written copy.  I understand these   instructions and will follow-up with my physician if I have any questions.     __________________________________       _____________________________________  Nurse Signature                                          Patient/Designated   Responsible Party Signature  MD Lyndsay Lou MD  11/16/2022 11:29:07 AM  [employer]

## 2022-11-16 NOTE — PROGRESS NOTES
[facility] - Intensive Care ([facility])  [facility] Medicine  Progress Note    Patient Name: Fransisco Durand  MRN: [id number]  Patient Class: IP- Inpatient   Admission Date: 11/14/2022  Length of Stay: 1 days  Attending Physician: Matheus Hernandez MD  Primary Care Provider: Kiera Dhaliwal MD        Subjective:     Principal Problem:Acute blood loss anemia        HPI:  Mr. Durand is a 72 year-old M with CHF, DM, COPD, who initially presented to [facility] ED on the evening of 11/14 with complaints of frontal headache, which started 3-4 hours PTA. He took his BP meds prior to arrival, and found to have hypotension with BP 78/47, . EKG noted with RBBB, LAHB. Central line place in ED and he was initiated on levophed. Cardiology consulted. Pt was able to be weaned off levophed. Noted Hb 11.1, and admitted to [facility] stool x1 in ED prior to transfer.  Also noted BUN 43, Cr 1.7, positive stool occult. Per patient he last had colonoscopy done a few years ago at [facility], however he does not recall any abnormal result. He is on ASA & Plavix daily, denies any NSAID use. Cardiology evaluated pt and recommended medical admission as they were able to confirm that RBBB was present on prior EKG obtained at [facility]. Initially was admitted to telemetry, however soon after he arrived in his room, rapid response was called due to hematemesis & hematochezia. Developed recurrence of hypotension shortly after repeat labs obtained showed Hb had dropped to 8.9. Subsequently was transferred to ICU with GI consult placed.    [facility]  Cardiology: Dr. Michele Wyman        Overview/Hospital Course:  Admitted for upper GI bleed. EGD: Three non-bleeding cratered gastric ulcers with a flat pigmented spot (Matt Class IIc) and the other with pigmented material were found in the gastric fundus and on the lesser curvature of the stomach. The largest lesion was 10 mm in largest dimension      Review of Systems   Constitutional:  Negative for chills and  fever.   HENT: Negative.     Eyes: Negative.    Respiratory:  Negative for shortness of breath and wheezing.         Chronic smoker with COPD   Cardiovascular:  Positive for leg swelling. Negative for chest pain.        Reports swelling is at baseline   Gastrointestinal:  Positive for blood in stool. Negative for abdominal pain, nausea and vomiting.   Endocrine: Negative.    Genitourinary:  Negative for decreased urine volume, difficulty urinating, dysuria and frequency.   Musculoskeletal: Negative.    Neurological:  Positive for headaches. Negative for dizziness.   All other systems reviewed and are negative.  Objective:     Vital Signs (Most Recent):  Temp: 97.8 °F (36.6 °C) (11/16/22 1110)  Pulse: 102 (11/16/22 1305)  Resp: (!) 27 (11/16/22 1305)  BP: (!) 116/56 (11/16/22 1300)  SpO2: 99 % (11/16/22 1305)   Vital Signs (24h Range):  Temp:  [97.7 °F (36.5 °C)-99 °F (37.2 °C)] 97.8 °F (36.6 °C)  Pulse:  [] 102  Resp:  [14-33] 27  SpO2:  [89 %-100 %] 99 %  BP: ()/(51-84) 116/56     Weight: 126.1 kg (278 lb)  Body mass index is 42.27 kg/m².    Intake/Output Summary (Last 24 hours) at 11/16/2022 1307  Last data filed at 11/16/2022 1305  Gross per 24 hour   Intake 2136.66 ml   Output 1625 ml   Net 511.66 ml      Physical Exam  Constitutional:       General: He is awake. He is not in acute distress.     Appearance: He is obese.      Comments: Chronically ill appearing male semi-upright in bed in [facility]   Cardiovascular:      Rate and Rhythm: Tachycardia present.      Comments: Generalized edema, chronic lymphedema to BLE  Pulmonary:      Effort: Pulmonary effort is normal.      Breath sounds: Decreased breath sounds present.   Abdominal:      General: There is no distension.      Palpations: Abdomen is soft.      Tenderness: There is no abdominal tenderness.   Skin:     General: Skin is warm and dry.   Neurological:      General: No focal deficit present.      Mental Status: He is alert.   Psychiatric:          Behavior: Behavior is cooperative.       Significant Labs: All pertinent labs within the past 24 hours have been reviewed.    Significant Imaging: I have reviewed all pertinent imaging results/findings within the past 24 hours.      Assessment/Plan:      * Acute blood loss anemia  -Secondary to melena & hematemesis  -Serial H/H  -Transfuse for Hb < 8 in setting of acute bleeding    GI consult, s/p EGD  Continue PPI  Hb stable    Upper GI bleed  S/p EGD  Continue PPI    CKD (chronic kidney disease) stage 3, GFR 30-59 ml/min  -Cr better than baseline  -Avoid nephrotoxins and renal dose meds as needed  -Monitor renal function, especially in setting of hypotension & acute blood loss anemia    CAD (coronary artery disease)  -Follows with [facility], Dr. Wyman for Cardiology  -Hold home ASA & Plavix given acute GI bleed  -Hold home Bumex, Toprol due to hypotension  -Evaluated by Dr. Whyte in ED  -TTE showed LVEF 35% with grade I diastolic dysfunction    Primary hypertension  -Hold home antihypertensives due to hypotension from acute Gi blood loss    Type 2 diabetes mellitus, without long-term current use of insulin  Patient's FSGs are controlled on current medication regimen.  Last A1c reviewed-   Lab Results   Component Value Date    HGBA1C 6.5 (H) 02/20/2021     Most recent fingerstick glucose reviewed-   Recent Labs   Lab 11/15/22  1311 11/15/22  1509 11/15/22  1657   POCTGLUCOSE 72 81 144*     Current correctional scale  Low  Start anti-hyperglycemic dose as follows-   Antihyperglycemics (From admission, onward)    Start     Stop Route Frequency Ordered    11/15/22 1503  insulin aspart U-100 pen 0-5 Units         -- SubQ Before meals & nightly PRN 11/15/22 1404        Hold Oral hypoglycemics while patient is in the hospital.    COPD (chronic obstructive pulmonary disease)  -Not in acute exacerbation  -Continue home inhalers    Chronic combined systolic and diastolic heart failure  -Appears compensated at this time  -Hold  home Bumex, Entresto due to hypotension  -Daily weights  -Strict I&Os    Patient is identified as having Combined Systolic and Diastolic heart failure that is Chronic. CHF is currently controlled. Latest ECHO performed and demonstrates- Results for orders placed during the hospital encounter of 11/14/22    Echo    Interpretation Summary  · Concentric hypertrophy and moderately decreased systolic function.  · The estimated ejection fraction is 35%.  · Grade I left ventricular diastolic dysfunction.  · There is mild-to-moderate aortic valve stenosis.  · Aortic valve area is 1.20 cm2; peak velocity is 2.89 m/s; mean gradient is 25 mmHg.  · Mild tricuspid regurgitation.        VTE Risk Mitigation (From admission, onward)         Ordered     Place sequential compression device  Until discontinued         11/15/22 1404     IP VTE HIGH RISK PATIENT  Once         11/15/22 1404     Place sequential compression device  Until discontinued         11/15/22 1232                Discharge Planning   GITA:      Code Status: Full Code   Is the patient medically ready for discharge?:     Reason for patient still in hospital (select all that apply): Patient trending condition, Treatment and Consult recommendations               Critical care time spent on the evaluation and treatment of severe organ dysfunction, review of pertinent labs and imaging studies, discussions with consulting providers and discussions with patient/family: 30 minutes.      Rosalio Valenzuela MD  Department of Hospital Medicine   [facility] - Intensive Care ([facility])

## 2022-11-16 NOTE — ASSESSMENT & PLAN NOTE
-Secondary to melena & hematemesis  -Serial H/H  -Transfuse for Hb < 8 in setting of acute bleeding    GI consult, s/p EGD  Continue PPI  Hb stable

## 2022-11-16 NOTE — SUBJECTIVE & OBJECTIVE
"Past Medical History:   Diagnosis Date    AICD generator infection     Anemia     Anticoagulant long-term use     Arthritis     CAD (coronary artery disease)     CHF (congestive heart failure)     Chronic pain     CKD (chronic kidney disease) stage 3, GFR 30-59 ml/min     COPD (chronic obstructive pulmonary disease)     Diabetes mellitus     Erectile dysfunction     Hyperlipemia     Hypertension     Neuropathy     Vitamin deficiency        Past Surgical History:   Procedure Laterality Date    CARDIAC SURGERY      CARPAL TUNNEL RELEASE      CORONARY ANGIOPLASTY WITH STENT PLACEMENT         Review of patient's allergies indicates:   Allergen Reactions    Asa [aspirin]      Pt states "uncoated" ASA     Family History       Problem Relation (Age of Onset)    Cancer Father    Heart disease Mother          Tobacco Use    Smoking status: Every Day     Packs/day: 1.00     Types: Cigarettes    Smokeless tobacco: Never   Substance and Sexual Activity    Alcohol use: Yes     Comment: very little    Drug use: No    Sexual activity: Yes     Review of Systems   Constitutional:  Negative for activity change, appetite change, chills, diaphoresis, fatigue and fever.   HENT:  Negative for trouble swallowing.    Respiratory:  Negative for shortness of breath.    Cardiovascular:  Negative for chest pain.   Gastrointestinal:  Positive for blood in stool and vomiting (x1, hematemesis). Negative for abdominal distention, abdominal pain, anal bleeding, constipation, nausea and rectal pain.   Musculoskeletal:  Negative for back pain.   Neurological:  Positive for weakness. Negative for dizziness, syncope and light-headedness.   Psychiatric/Behavioral:  Negative for dysphoric mood. The patient is not nervous/anxious.    Objective:     Vital Signs (Most Recent):  Temp: 97.9 °F (36.6 °C) (11/16/22 0701)  Pulse: 96 (11/16/22 0904)  Resp: (!) 21 (11/16/22 0904)  BP: (!) 101/59 (11/16/22 0900)  SpO2: 96 % (11/16/22 0904)   Vital Signs (24h " Range):  Temp:  [97.7 °F (36.5 °C)-99 °F (37.2 °C)] 97.9 °F (36.6 °C)  Pulse:  [] 96  Resp:  [14-33] 21  SpO2:  [89 %-100 %] 96 %  BP: ()/(51-84) 101/59     Weight: 126.1 kg (278 lb) (11/16/22 0544)  Body mass index is 42.27 kg/m².      Intake/Output Summary (Last 24 hours) at 11/16/2022 0906  Last data filed at 11/16/2022 0545  Gross per 24 hour   Intake 2071.36 ml   Output 1075 ml   Net 996.36 ml       Lines/Drains/Airways       Central Venous Catheter Line  Duration             Percutaneous Central Line Insertion/Assessment - Triple Lumen  11/15/22 0300 right internal jugular 1 day              Drain  Duration             Male External Urinary Catheter 11/15/22 1945 Other (Comment) <1 day              Peripheral Intravenous Line  Duration                  Peripheral IV - Single Lumen 11/14/22 2128 18 G Right Antecubital 1 day                    Physical Exam  Constitutional:       General: He is not in acute distress.     Appearance: Normal appearance. He is obese. He is not ill-appearing, toxic-appearing or diaphoretic.   HENT:      Head: Normocephalic and atraumatic.   Eyes:      General: No scleral icterus.     Extraocular Movements: Extraocular movements intact.   Cardiovascular:      Rate and Rhythm: Normal rate and regular rhythm.      Heart sounds: Murmur heard.   Pulmonary:      Effort: Pulmonary effort is normal. No respiratory distress.      Breath sounds: Normal breath sounds.   Abdominal:      General: Bowel sounds are normal. There is no distension.      Palpations: Abdomen is soft. There is no mass.      Tenderness: There is no abdominal tenderness. There is no guarding.   Musculoskeletal:         General: Normal range of motion.   Skin:     General: Skin is warm and dry.      Coloration: Skin is not jaundiced or pale.   Neurological:      Mental Status: He is alert and oriented to person, place, and time.       Significant Labs:  CBC:   Recent Labs   Lab 11/15/22  1526 11/15/22  4309  11/16/22  0331   WBC 10.06 12.70 11.75   HGB 8.9* 7.2* 9.0*   HCT 28.2* 22.9* 28.2*    131* 147*     CMP:   Recent Labs   Lab 11/14/22 2130 11/15/22  1526 11/16/22 0331   GLU 89   < > 98   CALCIUM 8.3*   < > 7.5*   ALBUMIN 2.7*  --   --    PROT 5.8*  --   --       < > 140   K 4.6   < > 4.6   CO2 23   < > 25      < > 109   BUN 43*   < > 38*   CREATININE 1.7*   < > 1.1   ALKPHOS 91  --   --    ALT 10  --   --    AST 10  --   --    BILITOT 0.3  --   --     < > = values in this interval not displayed.     Coagulation:   Recent Labs   Lab 11/14/22 2130   INR 1.0       Significant Imaging:  Imaging results within the past 24 hours have been reviewed.

## 2022-11-16 NOTE — ASSESSMENT & PLAN NOTE
-Suspect upper GI bleeding given hematemesis and melena requiring 2 units PRBCs. Now hemodynamically stable.  -Plan for EGD today.  -Keep NPO  -Continue with Protonix BID.  -Last dose of Plavix was Sunday. Endoscopist informed.  -No NSAIDs  -Continue to monitor H/H. Transfuse as needed.

## 2022-11-16 NOTE — SUBJECTIVE & OBJECTIVE
Review of Systems   Constitutional:  Negative for chills and fever.   HENT: Negative.     Eyes: Negative.    Respiratory:  Negative for shortness of breath and wheezing.         Chronic smoker with COPD   Cardiovascular:  Positive for leg swelling. Negative for chest pain.        Reports swelling is at baseline   Gastrointestinal:  Positive for blood in stool. Negative for abdominal pain, nausea and vomiting.   Endocrine: Negative.    Genitourinary:  Negative for decreased urine volume, difficulty urinating, dysuria and frequency.   Musculoskeletal: Negative.    Neurological:  Positive for headaches. Negative for dizziness.   All other systems reviewed and are negative.  Objective:     Vital Signs (Most Recent):  Temp: 97.8 °F (36.6 °C) (11/16/22 1110)  Pulse: 102 (11/16/22 1305)  Resp: (!) 27 (11/16/22 1305)  BP: (!) 116/56 (11/16/22 1300)  SpO2: 99 % (11/16/22 1305)   Vital Signs (24h Range):  Temp:  [97.7 °F (36.5 °C)-99 °F (37.2 °C)] 97.8 °F (36.6 °C)  Pulse:  [] 102  Resp:  [14-33] 27  SpO2:  [89 %-100 %] 99 %  BP: ()/(51-84) 116/56     Weight: 126.1 kg (278 lb)  Body mass index is 42.27 kg/m².    Intake/Output Summary (Last 24 hours) at 11/16/2022 1307  Last data filed at 11/16/2022 1305  Gross per 24 hour   Intake 2136.66 ml   Output 1625 ml   Net 511.66 ml      Physical Exam  Constitutional:       General: He is awake. He is not in acute distress.     Appearance: He is obese.      Comments: Chronically ill appearing male semi-upright in bed in [facility]   Cardiovascular:      Rate and Rhythm: Tachycardia present.      Comments: Generalized edema, chronic lymphedema to BLE  Pulmonary:      Effort: Pulmonary effort is normal.      Breath sounds: Decreased breath sounds present.   Abdominal:      General: There is no distension.      Palpations: Abdomen is soft.      Tenderness: There is no abdominal tenderness.   Skin:     General: Skin is warm and dry.   Neurological:      General: No focal deficit  present.      Mental Status: He is alert.   Psychiatric:         Behavior: Behavior is cooperative.       Significant Labs: All pertinent labs within the past 24 hours have been reviewed.    Significant Imaging: I have reviewed all pertinent imaging results/findings within the past 24 hours.

## 2022-11-16 NOTE — ANESTHESIA PREPROCEDURE EVALUATION
11/16/2022  Fransisco Durand is a 72 y.o., male.      Pre-op Assessment    I have reviewed the Patient Summary Reports.     I have reviewed the Nursing Notes. I have reviewed the NPO Status.   I have reviewed the Medications.     Review of Systems  Cardiovascular:   Hypertension CAD  Dysrhythmias  CHF  Coronary Artery Disease:  Congestive Heart Failure (CHF) , Chronic Congestive Heart Failure , LV Systolic HF Hypertension  Disorder of Cardiac Rhythm    Pulmonary:   COPD  Chronic Obstructive Pulmonary Disease (COPD):    Renal/:   Chronic Renal Disease  Kidney Function/Disease    Neurological:   Headaches    Endocrine:   Diabetes  Diabetes        Physical Exam  General: Well nourished, Cooperative, Alert and Oriented    Airway:  Mallampati: II   Mouth Opening: Normal  TM Distance: Normal  Tongue: Normal  Neck ROM: Normal ROM    Dental:  Intact        Anesthesia Plan  Type of Anesthesia, risks & benefits discussed:    Anesthesia Type: MAC  Intra-op Monitoring Plan: Standard ASA Monitors  Post Op Pain Control Plan: multimodal analgesia  Induction:  IV  Informed Consent: Informed consent signed with the Patient and all parties understand the risks and agree with anesthesia plan.  All questions answered. Patient consented to blood products? Yes  ASA Score: 4 Emergent  Day of Surgery Review of History & Physical: H&P Update referred to the surgeon/provider.    Ready For Surgery From Anesthesia Perspective.     .

## 2022-11-16 NOTE — ASSESSMENT & PLAN NOTE
- GI consulted, plans for EGD today   -continue Protonix IV twice a day  -NPO  -p.r.n. Zofran  -  Blood counts stable after transfusion overnight, monitor

## 2022-11-16 NOTE — ANESTHESIA POSTPROCEDURE EVALUATION
Anesthesia Post Evaluation    Patient: Fransisco Durand    Procedure(s) Performed: Procedure(s) (LRB):  EGD (ESOPHAGOGASTRODUODENOSCOPY) (N/A)    Final Anesthesia Type: MAC      Patient location during evaluation: GI PACU  Patient participation: Yes- Able to Participate  Level of consciousness: awake and alert and oriented  Post-procedure vital signs: reviewed and stable  Pain management: adequate  Airway patency: patent  TRUPTI mitigation strategies: Multimodal analgesia  PONV status at discharge: No PONV  Anesthetic complications: no      Cardiovascular status: blood pressure returned to baseline  Respiratory status: unassisted and nasal cannula  Hydration status: euvolemic  Follow-up not needed.          Vitals Value Taken Time   /68 11/16/22 1110   Temp  11/16/22 1110   Pulse 115 11/16/22 1109   Resp 20 11/16/22 1109   SpO2 93 % 11/16/22 1109   Vitals shown include unvalidated device data.      No case tracking events are documented in the log.      Pain/Renata Score: No data recorded

## 2022-11-16 NOTE — ASSESSMENT & PLAN NOTE
-GI consulted, pt made NPO for possible endoscopy tomorrow  -IV Protonix  -Serial H/H  -Low threshold for blood transfusion

## 2022-11-16 NOTE — CONSULTS
[facility] ([facility])  Gastroenterology  Consult Note    Patient Name: Fransisco Durand  MRN: [id number]  Admission Date: 11/14/2022  Hospital Length of Stay: 1 days  Code Status: Full Code   Attending Provider: Matheus Hernandez MD   Consulting Provider: Missy Shane PA-C  Primary Care Physician: Kiera Dhaliwal MD  Principal Problem:Acute blood loss anemia    Inpatient consult to Gastroenterology  Consult performed by: Missy Shane PA-C  Consult ordered by: Lauren Holloway MD  Reason for consult: GI bleed        Subjective:     HPI:  72-year-old male with a known past medical history of combined heart failure with the EF of 35%, diastolic dysfunction, remote AICD which was removed secondary to infection, anemia, arthritis, CAD, CKD, COPD, diabetes mellitus type 2, hypertension, obesity and neuropathy reported to an outside ED due to weakness and headaches. He reports that he thought his BP was high. Upon arrival to the ED, he was found to be hypotensive requiring pressor support. He was then transferred to [facility]. At this time, BP was stable, however, he again became hypotensive and began with hematemesis - he was transferred to the ICU. Hemoglobin levels went from 11.1 on 11/14 to 7.2 last night. He has had no additional hematemesis, however he does mention a dark black stool that occurred a few days ago. He has had bowel movements since then, but none that sound to be melenotic. He was transfused 2 units PRBC overnight. Hgb is currently stable at 9.0. Denies struggling with previous GI issues such as abdominal pain, heartburn, reflux. He is a daily smoker but currently in smoking cessation class. Drinks numerous cokes per day. Patient denies any current complaints other than hunger. Believes he may have had scopes in the past, but unsure.       Past Medical History:   Diagnosis Date    AICD generator infection     Anemia     Anticoagulant long-term use     Arthritis     CAD  "(coronary artery disease)     CHF (congestive heart failure)     Chronic pain     CKD (chronic kidney disease) stage 3, GFR 30-59 ml/min     COPD (chronic obstructive pulmonary disease)     Diabetes mellitus     Erectile dysfunction     Hyperlipemia     Hypertension     Neuropathy     Vitamin deficiency        Past Surgical History:   Procedure Laterality Date    CARDIAC SURGERY      CARPAL TUNNEL RELEASE      CORONARY ANGIOPLASTY WITH STENT PLACEMENT         Review of patient's allergies indicates:   Allergen Reactions    Asa [aspirin]      Pt states "uncoated" ASA     Family History       Problem Relation (Age of Onset)    Cancer Father    Heart disease Mother          Tobacco Use    Smoking status: Every Day     Packs/day: 1.00     Types: Cigarettes    Smokeless tobacco: Never   Substance and Sexual Activity    Alcohol use: Yes     Comment: very little    Drug use: No    Sexual activity: Yes     Review of Systems   Constitutional:  Negative for activity change, appetite change, chills, diaphoresis, fatigue and fever.   HENT:  Negative for trouble swallowing.    Respiratory:  Negative for shortness of breath.    Cardiovascular:  Negative for chest pain.   Gastrointestinal:  Positive for blood in stool and vomiting (x1, hematemesis). Negative for abdominal distention, abdominal pain, anal bleeding, constipation, nausea and rectal pain.   Musculoskeletal:  Negative for back pain.   Neurological:  Positive for weakness. Negative for dizziness, syncope and light-headedness.   Psychiatric/Behavioral:  Negative for dysphoric mood. The patient is not nervous/anxious.    Objective:     Vital Signs (Most Recent):  Temp: 97.9 °F (36.6 °C) (11/16/22 0701)  Pulse: 96 (11/16/22 0904)  Resp: (!) 21 (11/16/22 0904)  BP: (!) 101/59 (11/16/22 0900)  SpO2: 96 % (11/16/22 0904)   Vital Signs (24h Range):  Temp:  [97.7 °F (36.5 °C)-99 °F (37.2 °C)] 97.9 °F (36.6 °C)  Pulse:  [] 96  Resp:  [14-33] 21  SpO2:  " [89 %-100 %] 96 %  BP: ()/(51-84) 101/59     Weight: 126.1 kg (278 lb) (11/16/22 0544)  Body mass index is 42.27 kg/m².      Intake/Output Summary (Last 24 hours) at 11/16/2022 0906  Last data filed at 11/16/2022 0545  Gross per 24 hour   Intake 2071.36 ml   Output 1075 ml   Net 996.36 ml       Lines/Drains/Airways       Central Venous Catheter Line  Duration             Percutaneous Central Line Insertion/Assessment - Triple Lumen  11/15/22 0300 right internal jugular 1 day              Drain  Duration             Male External Urinary Catheter 11/15/22 1945 Other (Comment) <1 day              Peripheral Intravenous Line  Duration                  Peripheral IV - Single Lumen 11/14/22 2128 18 G Right Antecubital 1 day                    Physical Exam  Constitutional:       General: He is not in acute distress.     Appearance: Normal appearance. He is obese. He is not ill-appearing, toxic-appearing or diaphoretic.   HENT:      Head: Normocephalic and atraumatic.   Eyes:      General: No scleral icterus.     Extraocular Movements: Extraocular movements intact.   Cardiovascular:      Rate and Rhythm: Normal rate and regular rhythm.      Heart sounds: Murmur heard.   Pulmonary:      Effort: Pulmonary effort is normal. No respiratory distress.      Breath sounds: Normal breath sounds.   Abdominal:      General: Bowel sounds are normal. There is no distension.      Palpations: Abdomen is soft. There is no mass.      Tenderness: There is no abdominal tenderness. There is no guarding.   Musculoskeletal:         General: Normal range of motion.   Skin:     General: Skin is warm and dry.      Coloration: Skin is not jaundiced or pale.   Neurological:      Mental Status: He is alert and oriented to person, place, and time.       Significant Labs:  CBC:   Recent Labs   Lab 11/15/22  1526 11/15/22  1809 11/16/22  0331   WBC 10.06 12.70 11.75   HGB 8.9* 7.2* 9.0*   HCT 28.2* 22.9* 28.2*    131* 147*     CMP:    Recent Labs   Lab 11/14/22 2130 11/15/22  1526 11/16/22  0331   GLU 89   < > 98   CALCIUM 8.3*   < > 7.5*   ALBUMIN 2.7*  --   --    PROT 5.8*  --   --       < > 140   K 4.6   < > 4.6   CO2 23   < > 25      < > 109   BUN 43*   < > 38*   CREATININE 1.7*   < > 1.1   ALKPHOS 91  --   --    ALT 10  --   --    AST 10  --   --    BILITOT 0.3  --   --     < > = values in this interval not displayed.     Coagulation:   Recent Labs   Lab 11/14/22 2130   INR 1.0       Significant Imaging:  Imaging results within the past 24 hours have been reviewed.    Assessment/Plan:     * Acute blood loss anemia  -Suspect upper GI bleeding given hematemesis and melena requiring 2 units PRBCs. Now hemodynamically stable.  -Plan for EGD today.  -Keep NPO  -Continue with Protonix BID.  -Last dose of Plavix was Sunday. Endoscopist informed.  -No NSAIDs  -Continue to monitor H/H. Transfuse as needed.      Hematemesis without nausea  -See anemia plan.    Melena  -x 1 episode. Denies recurrent melena.   -EGD today. See anemia plan.        Thank you for your consult. I will follow-up with patient. Please contact us if you have any additional questions.    Missy Shane PA-C  Gastroenterology  [facility] ([facility])

## 2022-11-16 NOTE — ASSESSMENT & PLAN NOTE
-Secondary to melena & hematemesis  -Serial H/H  -Transfuse for Hb < 8 in setting of acute bleeding

## 2022-11-16 NOTE — ASSESSMENT & PLAN NOTE
-Cr better than baseline  -Avoid nephrotoxins and renal dose meds as needed  -Monitor renal function, especially in setting of hypotension & acute blood loss anemia

## 2022-11-16 NOTE — ASSESSMENT & PLAN NOTE
-Patient presented with hypotension, etiology initially unclear, however noted to have melena & hematemesis since admission  -Initially required levophed, however was able to be weaned off  -IV fluid

## 2022-11-16 NOTE — ASSESSMENT & PLAN NOTE
Patient's FSGs are controlled on current medication regimen.  Last A1c reviewed-   Lab Results   Component Value Date    HGBA1C 6.5 (H) 02/20/2021     Most recent fingerstick glucose reviewed-   Recent Labs   Lab 11/15/22  1311 11/15/22  1509 11/15/22  1657   POCTGLUCOSE 72 81 144*     Current correctional scale  Low  Start anti-hyperglycemic dose as follows-   Antihyperglycemics (From admission, onward)    Start     Stop Route Frequency Ordered    11/15/22 1503  insulin aspart U-100 pen 0-5 Units         -- SubQ Before meals & nightly PRN 11/15/22 1404        Hold Oral hypoglycemics while patient is in the hospital.

## 2022-11-16 NOTE — HPI
72-year-old male with a known past medical history of combined heart failure with the EF of 35%, diastolic dysfunction, remote AICD which was removed secondary to infection, anemia, arthritis, CAD, CKD, COPD, diabetes mellitus type 2, hypertension, obesity and neuropathy reported to an outside ED due to weakness and headaches. He reports that he thought his BP was high. Upon arrival to the ED, he was found to be hypotensive requiring pressor support. He was then transferred to [facility]. At this time, BP was stable, however, he again became hypotensive and began with hematemesis - he was transferred to the ICU. Hemoglobin levels went from 11.1 on 11/14 to 7.2 last night. He has had no additional hematemesis, however he does mention a dark black stool that occurred a few days ago. He has had bowel movements since then, but none that sound to be melenotic. He was transfused 2 units PRBC overnight. Hgb is currently stable at 9.0. Denies struggling with previous GI issues such as abdominal pain, heartburn, reflux. He is a daily smoker but currently in smoking cessation class. Drinks numerous cokes per day. Patient denies any current complaints other than hunger. Believes he may have had scopes in the past, but unsure.

## 2022-11-16 NOTE — ASSESSMENT & PLAN NOTE
-off levophed with acceptable hemodynamics   -hold antiplatelets secondary to GI bleeding, resume when okay with GI pending EGD findings   continue to hold blood pressure medications, resume as clinically indicated   -monitor telemetry  -echo reviewed (EF 35% with grade I diastolic dysfunction), appears compensated currently, may need prn diuresis -- follow closely

## 2022-11-16 NOTE — H&P
[facility] ([facility] Medicine  History & Physical    Patient Name: Fransisco Durand  MRN: [id number]  Patient Class: IP- Inpatient  Admission Date: 11/14/2022  Attending Physician: Matheus Hernandez MD   Primary Care Provider: Kiera Dhaliwal MD         Patient information was obtained from patient, past medical records and ER records.     Subjective:     Principal Problem:Acute blood loss anemia    Chief Complaint:   Chief Complaint   Patient presents with    Headache     Headache began 3-4 hours pta. Was sitting in the bed with his feet up when pain began.         HPI: Mr. Durand is a 72 year-old M with CHF, DM, COPD, who initially presented to [facility] ED on the evening of 11/14 with complaints of frontal headache, which started 3-4 hours PTA. He took his BP meds prior to arrival, and found to have hypotension with BP 78/47, . EKG noted with RBBB, LAHB. Central line place in ED and he was initiated on levophed. Cardiology consulted. Pt was able to be weaned off levophed. Noted Hb 11.1, and admitted to [facility] stool x1 in ED prior to transfer.  Also noted BUN 43, Cr 1.7, positive stool occult. Per patient he last had colonoscopy done a few years ago at [facility], however he does not recall any abnormal result. He is on ASA & Plavix daily, denies any NSAID use. Cardiology evaluated pt and recommended medical admission as they were able to confirm that RBBB was present on prior EKG obtained at [facility]. Initially was admitted to telemetry, however soon after he arrived in his room, rapid response was called due to hematemesis & hematochezia. Developed recurrence of hypotension shortly after repeat labs obtained showed Hb had dropped to 8.9. Subsequently was transferred to ICU with GI consult placed.    [facility]  Cardiology: Dr. Michele Wyman        Past Medical History:   Diagnosis Date    AICD generator infection     Anemia     Anticoagulant long-term use     Arthritis     CAD (coronary  "artery disease)     CHF (congestive heart failure)     Chronic pain     CKD (chronic kidney disease) stage 3, GFR 30-59 ml/min     COPD (chronic obstructive pulmonary disease)     Diabetes mellitus     Erectile dysfunction     Hyperlipemia     Hypertension     Neuropathy     Vitamin deficiency        Past Surgical History:   Procedure Laterality Date    CARDIAC SURGERY      CARPAL TUNNEL RELEASE      CORONARY ANGIOPLASTY WITH STENT PLACEMENT         Review of patient's allergies indicates:   Allergen Reactions    Asa [aspirin]      Pt states "uncoated" ASA       No current facility-administered medications on file prior to encounter.     Current Outpatient Medications on File Prior to Encounter   Medication Sig    albuterol (PROVENTIL) 5 mg/mL nebulizer solution Take 2.5 mg by nebulization every 6 (six) hours as needed for Wheezing. Rescue    albuterol (PROVENTIL/VENTOLIN HFA) 90 mcg/actuation inhaler Inhale 2 puffs into the lungs every 6 (six) hours as needed.    aspirin 81 MG Chew Take 81 mg by mouth once daily.    atorvastatin (LIPITOR) 80 MG tablet Take 1 tablet by mouth every evening.    budesonide-formoterol 160-4.5 mcg (SYMBICORT) 160-4.5 mcg/actuation HFAA Inhale 2 puffs into the lungs every 12 (twelve) hours. Controller    bumetanide (BUMEX) 1 MG tablet Take 1 mg by mouth 2 (two) times a day.    clopidogreL (PLAVIX) 75 mg tablet Take 75 mg by mouth once daily.    cyanocobalamin (VITAMIN B-12) 1000 MCG tablet Take 1,000 mcg by mouth once daily.    ergocalciferol (ERGOCALCIFEROL) 50,000 unit Cap Take 50,000 Units by mouth every 7 days.    lidocaine (LIDODERM) 5 % Place 1 patch onto the skin once daily. Remove & Discard patch within 12 hours or as directed by MD    metFORMIN (GLUCOPHAGE-XR) 500 MG ER 24hr tablet Take 500 mg by mouth every morning.    metoprolol succinate (TOPROL-XL) 25 MG 24 hr tablet Take 1 tablet (25 mg total) by mouth once daily.    potassium chloride (K-TAB) 20 " mEq Take 0.2 mEq by mouth 2 (two) times a day.    sacubitriL-valsartan (ENTRESTO) 24-26 mg per tablet Take 1 tablet by mouth 2 (two) times daily.    tiotropium (SPIRIVA) 18 mcg inhalation capsule Inhale 18 mcg into the lungs once daily. Controller    [DISCONTINUED] acetaminophen (TYLENOL) 500 MG tablet Take 500 mg by mouth 3 (three) times daily.    [DISCONTINUED] bumetanide (BUMEX) 1 MG tablet Take 1 tablet (1 mg total) by mouth 2 (two) times daily.    [DISCONTINUED] cyanocobalamin (VITAMIN B-12) 1000 MCG tablet Take 100 mcg by mouth once daily.    [DISCONTINUED] escitalopram oxalate (LEXAPRO) 5 MG Tab Take 5 mg by mouth nightly.    [DISCONTINUED] ferrous sulfate 325 (65 FE) MG EC tablet Take 325 mg by mouth every 48 hours.    [DISCONTINUED] gabapentin (NEURONTIN) 300 MG capsule Take 300 mg by mouth every evening.    [DISCONTINUED] hydrALAZINE (APRESOLINE) 25 MG tablet Take 25 mg by mouth 3 (three) times daily.    [DISCONTINUED] metFORMIN (GLUMETZA) 500 MG (MOD) 24 hr tablet Take 500 mg by mouth daily with breakfast.    [DISCONTINUED] metOLazone (ZAROXOLYN) 5 MG tablet Take 1 tablet (5 mg total) by mouth daily as needed (scrotal swelling).    [DISCONTINUED] sildenafil (VIAGRA) 50 MG tablet Take 50 mg by mouth once a week.    [DISCONTINUED] valsartan (DIOVAN) 40 MG tablet Take 26 mg by mouth 2 (two) times daily.     Family History       Problem Relation (Age of Onset)    Cancer Father    Heart disease Mother          Tobacco Use    Smoking status: Every Day     Packs/day: 1.00     Types: Cigarettes    Smokeless tobacco: Never   Substance and Sexual Activity    Alcohol use: Yes     Comment: very little    Drug use: No    Sexual activity: Yes     Review of Systems   Constitutional:  Negative for chills and fever.   HENT: Negative.     Eyes: Negative.    Respiratory:  Negative for shortness of breath and wheezing.         Chronic smoker with COPD   Cardiovascular:  Positive for leg swelling. Negative  for chest pain.        Reports swelling is at baseline   Gastrointestinal:  Positive for blood in stool. Negative for abdominal pain, nausea and vomiting.   Endocrine: Negative.    Genitourinary:  Negative for decreased urine volume, difficulty urinating, dysuria and frequency.   Musculoskeletal: Negative.    Neurological:  Positive for headaches. Negative for dizziness.   All other systems reviewed and are negative.  Objective:     Vital Signs (Most Recent):  Temp: 98.1 °F (36.7 °C) (11/15/22 1325)  Pulse: 94 (11/15/22 1325)  Resp: 20 (11/15/22 1325)  BP: 103/64 (11/15/22 1325)  SpO2: (!) 94 % (11/15/22 1325)   Vital Signs (24h Range):  Temp:  [97.8 °F (36.6 °C)-98.1 °F (36.7 °C)] 98.1 °F (36.7 °C)  Pulse:  [] 94  Resp:  [15-30] 20  SpO2:  [93 %-100 %] 94 %  BP: ()/(47-76) 103/64     Weight: 127 kg (280 lb)  Body mass index is 42.57 kg/m².    Physical Exam  Vitals and nursing note reviewed. Exam conducted with a chaperone present.   Constitutional:       General: He is not in acute distress.     Appearance: He is obese. He is not ill-appearing.   HENT:      Head: Normocephalic and atraumatic.      Right Ear: External ear normal.      Left Ear: External ear normal.      Nose: Nose normal.      Mouth/Throat:      Mouth: Mucous membranes are moist.   Eyes:      Extraocular Movements: Extraocular movements intact.      Pupils: Pupils are equal, round, and reactive to light.   Cardiovascular:      Rate and Rhythm: Normal rate and regular rhythm.   Pulmonary:      Effort: Pulmonary effort is normal. No respiratory distress.      Breath sounds: No wheezing.      Comments: On room air  Abdominal:      General: Bowel sounds are normal.      Palpations: Abdomen is soft.      Tenderness: There is no abdominal tenderness. There is no guarding or rebound.   Genitourinary:     Comments: Deferred   Musculoskeletal:      Cervical back: Neck supple.      Right lower leg: Edema present.      Left lower leg: Edema  present.   Skin:     General: Skin is warm and dry.      Comments: Venous stasis changes on LE   Neurological:      Mental Status: He is alert and oriented to person, place, and time. Mental status is at baseline.   Psychiatric:      Comments: Calm, cooperative         CRANIAL NERVES     CN III, IV, VI   Pupils are equal, round, and reactive to light.     Significant Labs: All pertinent labs within the past 24 hours have been reviewed.  Blood Culture:   Recent Labs   Lab 11/14/22 2129 11/14/22 2156   LABBLOO No Growth to date No Growth to date     CBC:   Recent Labs   Lab 11/14/22 2130   WBC 10.00   HGB 11.1*   HCT 34.8*        CMP:   Recent Labs   Lab 11/14/22 2130      K 4.6      CO2 23   GLU 89   BUN 43*   CREATININE 1.7*   CALCIUM 8.3*   PROT 5.8*   ALBUMIN 2.7*   BILITOT 0.3   ALKPHOS 91   AST 10   ALT 10   ANIONGAP 10     Cardiac Markers:   Recent Labs   Lab 11/14/22 2130   BNP 22       Significant Imaging: I have reviewed all pertinent imaging results/findings within the past 24 hours.    Assessment/Plan:     * Acute blood loss anemia  -Secondary to melena & hematemesis  -Serial H/H  -Transfuse for Hb < 8 in setting of acute bleeding    Hematemesis without nausea  -Developed hematemesis after admission  -GI consulted  -NPO, may need endoscopy in the morning  -Monitor in ICU  -Low threshold for transfusion    Hypotension  -Patient presented with hypotension, etiology initially unclear, however noted to have melena & hematemesis since admission  -Initially required levophed, however was able to be weaned off  -IV fluid    Melena  -GI consulted, pt made NPO for possible endoscopy tomorrow  -IV Protonix  -Serial H/H  -Low threshold for blood transfusion    CKD (chronic kidney disease) stage 3, GFR 30-59 ml/min  -Cr better than baseline  -Avoid nephrotoxins and renal dose meds as needed  -Monitor renal function, especially in setting of hypotension & acute blood loss anemia    CAD (coronary  artery disease)  -Follows with [facility], Dr. Wyman for Cardiology  -Hold home ASA & Plavix given acute GI bleed  -Hold home Bumex, Toprol due to hypotension  -Evaluated by Dr. Whyte in ED  -TTE showed LVEF 35% with grade I diastolic dysfunction    Primary hypertension  -Hold home antihypertensives due to hypotension from acute Gi blood loss    Type 2 diabetes mellitus, without long-term current use of insulin  Patient's FSGs are controlled on current medication regimen.  Last A1c reviewed-   Lab Results   Component Value Date    HGBA1C 6.5 (H) 02/20/2021     Most recent fingerstick glucose reviewed-   Recent Labs   Lab 11/15/22  1311 11/15/22  1509 11/15/22  1657   POCTGLUCOSE 72 81 144*     Current correctional scale  Low  Start anti-hyperglycemic dose as follows-   Antihyperglycemics (From admission, onward)    Start     Stop Route Frequency Ordered    11/15/22 1503  insulin aspart U-100 pen 0-5 Units         -- SubQ Before meals & nightly PRN 11/15/22 1404        Hold Oral hypoglycemics while patient is in the hospital.    COPD (chronic obstructive pulmonary disease)  -Not in acute exacerbation  -Continue home inhalers    Chronic combined systolic and diastolic heart failure  -Appears compensated at this time  -Hold home Bumex, Entresto due to hypotension  -Daily weights  -Strict I&Os        VTE Risk Mitigation (From admission, onward)         Ordered     Place sequential compression device  Until discontinued         11/15/22 1404     IP VTE HIGH RISK PATIENT  Once         11/15/22 1404     Place sequential compression device  Until discontinued         11/15/22 1232              Critical care time spent on the evaluation and treatment of severe organ dysfunction, review of pertinent labs and imaging studies, discussions with consulting providers and discussions with patient/family: 40 minutes.     Lauren Holloway MD  Department of Hospital Medicine   [facility] ([facility])

## 2022-11-16 NOTE — H&P
Short Stay Endoscopy History and Physical    PCP - Kiera Dhaliwal MD    Procedure - EGD  ASA - 3  Mallampati - per anesthesia  History of Anesthesia problems - no  Family history Anesthesia problems -  no     HPI:  This is a 72 y.o. male here for evaluation of :   Active Hospital Problems    Diagnosis  POA    *Acute blood loss anemia [D62]  Yes    Melena [K92.1]  Yes    Hypotension [I95.9]  Yes    Hematemesis without nausea [K92.0]  Yes    CAD (coronary artery disease) [I25.10]  Yes    CKD (chronic kidney disease) stage 3, GFR 30-59 ml/min [N18.30]  Yes    Primary hypertension [I10]  Yes     Chronic    Type 2 diabetes mellitus, without long-term current use of insulin [E11.9]  Yes    COPD (chronic obstructive pulmonary disease) [J44.9]  Yes    Chronic combined systolic and diastolic heart failure [I50.42]  Yes      Resolved Hospital Problems   No resolved problems to display.       ROS:  CONSTITUTIONAL: Denies weight change,  fatigue, fevers, chills, night sweats.  CARDIOVASCULAR: Denies chest pain, shortness of breath, orthopnea and edema.  RESPIRATORY: Denies cough, hemoptysis, dyspnea, and wheezing.  GI: See HPI.    Medical History:   Past Medical History:   Diagnosis Date    AICD generator infection     Anemia     Anticoagulant long-term use     Arthritis     CAD (coronary artery disease)     CHF (congestive heart failure)     Chronic pain     CKD (chronic kidney disease) stage 3, GFR 30-59 ml/min     COPD (chronic obstructive pulmonary disease)     Diabetes mellitus     Erectile dysfunction     Hyperlipemia     Hypertension     Neuropathy     Vitamin deficiency        Surgical History:   Past Surgical History:   Procedure Laterality Date    CARDIAC SURGERY      CARPAL TUNNEL RELEASE      CORONARY ANGIOPLASTY WITH STENT PLACEMENT         Family History:  Family History   Problem Relation Age of Onset    Heart disease Mother     Cancer Father        Social History:   Social History     Tobacco Use    Smoking  "status: Every Day     Packs/day: 0.50     Types: Cigarettes    Smokeless tobacco: Never   Substance Use Topics    Alcohol use: Yes     Comment: very little    Drug use: No       Allergy  Review of patient's allergies indicates:   Allergen Reactions    Asa [aspirin]      Pt states "uncoated" ASA       Medications:   No current facility-administered medications on file prior to encounter.     Current Outpatient Medications on File Prior to Encounter   Medication Sig Dispense Refill    albuterol (PROVENTIL) 5 mg/mL nebulizer solution Take 2.5 mg by nebulization every 6 (six) hours as needed for Wheezing. Rescue      albuterol (PROVENTIL/VENTOLIN HFA) 90 mcg/actuation inhaler Inhale 2 puffs into the lungs every 6 (six) hours as needed.      aspirin 81 MG Chew Take 81 mg by mouth once daily.      atorvastatin (LIPITOR) 80 MG tablet Take 1 tablet by mouth every evening.      budesonide-formoterol 160-4.5 mcg (SYMBICORT) 160-4.5 mcg/actuation HFAA Inhale 2 puffs into the lungs every 12 (twelve) hours. Controller      bumetanide (BUMEX) 1 MG tablet Take 1 mg by mouth 2 (two) times a day.      clopidogreL (PLAVIX) 75 mg tablet Take 75 mg by mouth once daily.      cyanocobalamin (VITAMIN B-12) 1000 MCG tablet Take 1,000 mcg by mouth once daily.      ergocalciferol (ERGOCALCIFEROL) 50,000 unit Cap Take 50,000 Units by mouth every 7 days.      lidocaine (LIDODERM) 5 % Place 1 patch onto the skin once daily. Remove & Discard patch within 12 hours or as directed by MD      metFORMIN (GLUCOPHAGE-XR) 500 MG ER 24hr tablet Take 500 mg by mouth every morning.      metoprolol succinate (TOPROL-XL) 25 MG 24 hr tablet Take 1 tablet (25 mg total) by mouth once daily. 90 tablet 3    potassium chloride (K-TAB) 20 mEq Take 0.2 mEq by mouth 2 (two) times a day.      sacubitriL-valsartan (ENTRESTO) 24-26 mg per tablet Take 1 tablet by mouth 2 (two) times daily.      tiotropium (SPIRIVA) 18 mcg inhalation capsule Inhale 18 mcg into the lungs " once daily. Controller      [DISCONTINUED] gabapentin (NEURONTIN) 300 MG capsule Take 300 mg by mouth every evening.      [DISCONTINUED] hydrALAZINE (APRESOLINE) 25 MG tablet Take 25 mg by mouth 3 (three) times daily.      [DISCONTINUED] sildenafil (VIAGRA) 50 MG tablet Take 50 mg by mouth once a week.      [DISCONTINUED] valsartan (DIOVAN) 40 MG tablet Take 26 mg by mouth 2 (two) times daily.         Physical Exam:  Vital Signs:   Vitals:    11/16/22 1000   BP: (!) 95/54   Pulse: 92   Resp: 16   Temp:      General Appearance: Well appearing in no acute distress  ENT: OP clear  Chest: CTA B  CV: RRR, no m/r/g  Abd: s/nt/nd/nabs  Ext: no edema    Labs:  Reviewed    IMP:  Active Hospital Problems    Diagnosis  POA    *Acute blood loss anemia [D62]  Yes    Melena [K92.1]  Yes    Hypotension [I95.9]  Yes    Hematemesis without nausea [K92.0]  Yes    CAD (coronary artery disease) [I25.10]  Yes    CKD (chronic kidney disease) stage 3, GFR 30-59 ml/min [N18.30]  Yes    Primary hypertension [I10]  Yes     Chronic    Type 2 diabetes mellitus, without long-term current use of insulin [E11.9]  Yes    COPD (chronic obstructive pulmonary disease) [J44.9]  Yes    Chronic combined systolic and diastolic heart failure [I50.42]  Yes      Resolved Hospital Problems   No resolved problems to display.         Plan:  I have explained the risks and benefits of endoscopy procedures to the patient including but not limited to bleeding, perforation, infection, and death. The patient wishes to proceed.

## 2022-11-16 NOTE — TRANSFER OF CARE
"Anesthesia Transfer of Care Note    Patient: Fransisco Durand    Procedure(s) Performed: Procedure(s) (LRB):  EGD (ESOPHAGOGASTRODUODENOSCOPY) (N/A)    Patient location: ICU    Anesthesia Type: MAC    Transport from OR: Transported from OR on 2-3 L/min O2 by NC with adequate spontaneous ventilation    Post pain: adequate analgesia    Post assessment: no apparent anesthetic complications    Post vital signs: stable    Level of consciousness: responds to stimulation, awake, alert and oriented    Nausea/Vomiting: no nausea/vomiting    Complications: none    Transfer of care protocol was followed      Last vitals:   Visit Vitals  /60   Pulse 94   Temp 36.6 °C (97.9 °F) (Oral)   Resp (!) 22   Ht 5' 8" (1.727 m)   Wt 126.1 kg (278 lb)   SpO2 96%   BMI 42.27 kg/m²     "

## 2022-11-17 NOTE — PROGRESS NOTES
[facility] Telemetry ([facility])  Gastroenterology  Progress Note    Patient Name: Fransisco Durand  MRN: [id number]  Admission Date: 11/14/2022  Hospital Length of Stay: 2 days  Code Status: Full Code   Attending Provider: Mayco Hickey MD  Consulting Provider: Missy Shane PA-C  Primary Care Physician: [facility]  Principal Problem: Acute blood loss anemia      Subjective:     Interval History: s/p EGD yesterday with non bleeding ulcers. Clot material noted. Clips placed and injected with epi. Today, he has no complaints. Denies abdominal pain, nausea, vomiting, overt bleeding. Has not had a BM in the past couple of days, per patient. Morning labs show hgb dropped to 7.9 from 9.0 overnight.    Review of Systems   Constitutional:  Negative for activity change, appetite change, chills, diaphoresis, fatigue and fever.   HENT:  Negative for trouble swallowing.    Respiratory:  Positive for shortness of breath (chronic).    Cardiovascular:  Positive for leg swelling. Negative for chest pain.   Gastrointestinal:  Negative for abdominal distention, abdominal pain, anal bleeding, blood in stool, diarrhea, nausea and vomiting. Constipation: no BM in a few days per patient.  Musculoskeletal:  Negative for back pain.   Neurological:  Negative for dizziness, weakness and light-headedness.   Psychiatric/Behavioral:  Negative for dysphoric mood. The patient is not nervous/anxious.    Objective:     Vital Signs (Most Recent):  Temp: 98.3 °F (36.8 °C) (11/17/22 0804)  Pulse: 85 (11/17/22 0808)  Resp: 18 (11/17/22 0808)  BP: (!) 112/57 (11/17/22 0804)  SpO2: 97 % (11/17/22 0808)   Vital Signs (24h Range):  Temp:  [97.7 °F (36.5 °C)-98.4 °F (36.9 °C)] 98.3 °F (36.8 °C)  Pulse:  [] 85  Resp:  [16-28] 18  SpO2:  [93 %-99 %] 97 %  BP: ()/(56-69) 112/57     Weight: 125.9 kg (277 lb 9 oz) (11/17/22 0529)  Body mass index is 42.2 kg/m².      Intake/Output Summary (Last 24 hours) at 11/17/2022 1051  Last data filed at  11/17/2022 0359  Gross per 24 hour   Intake 570 ml   Output 1450 ml   Net -880 ml       Lines/Drains/Airways       Drain  Duration             Male External Urinary Catheter 11/15/22 1945 Other (Comment) 1 day              Peripheral Intravenous Line  Duration                  Peripheral IV - Single Lumen 11/14/22 2128 18 G Right Antecubital 2 days                    Physical Exam  Constitutional:       General: He is not in acute distress.     Appearance: Normal appearance. He is obese. He is not ill-appearing, toxic-appearing or diaphoretic.   HENT:      Head: Normocephalic and atraumatic.   Eyes:      Extraocular Movements: Extraocular movements intact.   Cardiovascular:      Rate and Rhythm: Normal rate and regular rhythm.      Heart sounds: Murmur heard.   Pulmonary:      Effort: Pulmonary effort is normal. No respiratory distress.      Comments: Nasal cannula  Abdominal:      General: Bowel sounds are normal. There is no distension.      Palpations: Abdomen is soft.      Tenderness: There is no abdominal tenderness. There is no guarding.   Musculoskeletal:         General: Normal range of motion.      Cervical back: Normal range of motion.      Right lower leg: Edema present.      Left lower leg: Edema present.   Skin:     General: Skin is warm and dry.   Neurological:      Mental Status: He is alert and oriented to person, place, and time.       Significant Labs:  CBC:   Recent Labs   Lab 11/15/22  1809 11/16/22  0331 11/17/22  0936   WBC 12.70 11.75 10.13   HGB 7.2* 9.0* 7.9*   HCT 22.9* 28.2* 25.1*   * 147* 147*     CMP:   Recent Labs   Lab 11/16/22  0331   GLU 98   CALCIUM 7.5*      K 4.6   CO2 25      BUN 38*   CREATININE 1.1     Coagulation: No results for input(s): PT, INR, APTT in the last 48 hours.      Significant Imaging:  Imaging results within the past 24 hours have been reviewed.    Assessment/Plan:     * Acute blood loss anemia  -s/p EGD with gastric ulcers with no active  bleeding, but clot material noted.   -Hgb this AM shows decrease from 9.0 yesterday to 7.9.  -Denies overt bleeding.  -Discussed with  and Dr. Whitten. Continue to monitor H/H for now. Transfuse as needed.  -Do not advance diet yet.   -Keep on Protonix. Upon d/c, will need to be on Protonix BID x 2 months with repeat EGD in 2 months. Outpatient follow up in 2 weeks.  -No NSAIDs        Upper GI bleed  -See anemia plan.        Thank you for your consult. I will follow-up with patient. Please contact us if you have any additional questions.    Missy Shane PA-C  Gastroenterology  [facility] ([facility])

## 2022-11-17 NOTE — SUBJECTIVE & OBJECTIVE
Interval History: hgb 7.9 this am. Asymptomatic, no signs of bleeding. Will discuss with GI. Cont CLD. Trend h/h q8h.     Review of Systems   Constitutional:  Negative for fatigue and fever.   HENT:  Negative for sinus pressure.    Eyes:  Negative for visual disturbance.   Respiratory:  Negative for shortness of breath.    Cardiovascular:  Negative for chest pain.   Gastrointestinal:  Negative for nausea and vomiting.   Genitourinary:  Negative for difficulty urinating.   Musculoskeletal:  Negative for back pain.   Skin:  Negative for rash.   Neurological:  Negative for headaches.   Psychiatric/Behavioral:  Negative for confusion.    Objective:     Vital Signs (Most Recent):  Temp: 98.3 °F (36.8 °C) (11/17/22 0804)  Pulse: 85 (11/17/22 0808)  Resp: 18 (11/17/22 0808)  BP: (!) 112/57 (11/17/22 0804)  SpO2: 97 % (11/17/22 0808)   Vital Signs (24h Range):  Temp:  [97.7 °F (36.5 °C)-98.4 °F (36.9 °C)] 98.3 °F (36.8 °C)  Pulse:  [] 85  Resp:  [16-28] 18  SpO2:  [93 %-99 %] 97 %  BP: ()/(56-69) 112/57     Weight: 125.9 kg (277 lb 9 oz)  Body mass index is 42.2 kg/m².    Intake/Output Summary (Last 24 hours) at 11/17/2022 1042  Last data filed at 11/17/2022 0359  Gross per 24 hour   Intake 570 ml   Output 1450 ml   Net -880 ml      Physical Exam  Constitutional:       General: He is not in acute distress.     Appearance: He is well-developed. He is not diaphoretic.   HENT:      Head: Normocephalic and atraumatic.   Eyes:      Pupils: Pupils are equal, round, and reactive to light.   Cardiovascular:      Rate and Rhythm: Normal rate and regular rhythm.      Heart sounds: Normal heart sounds. No murmur heard.    No friction rub. No gallop.   Pulmonary:      Effort: Pulmonary effort is normal. No respiratory distress.      Breath sounds: Normal breath sounds. No stridor. No wheezing or rales.   Abdominal:      General: Bowel sounds are normal. There is no distension.      Palpations: Abdomen is soft. There is no  mass.      Tenderness: There is no abdominal tenderness. There is no guarding.   Skin:     General: Skin is warm.      Findings: No erythema.   Neurological:      Mental Status: He is alert and oriented to person, place, and time.       Significant Labs: All pertinent labs within the past 24 hours have been reviewed.    Significant Imaging: I have reviewed all pertinent imaging results/findings within the past 24 hours.

## 2022-11-17 NOTE — PLAN OF CARE
Aox4. Able to verbalize needs & follow commands. Calm & cooperative throughout shift.   POC reviewed with pt. Interventions implemented as appropriate.    VSS; SR on tele-monitor.  On 2L NC. Respiratory treatments as ordered.    PIV patent. Integrity maintained.    Skin WDI.    No c/o pain.    Continent of b/b.   CBG Q6H.    Ambulates w/ stand-by assist. Activity ad adriano. Frequent position changes encouraged. Able to reposition in bed independently.  Educated on s/sx of pressure injury;  verbalized understanding.  NADN. Resting quietly in bed.   Free of falls. Hourly rounding complete.   All safety measures remain in place. SR up x2; bed low & locked. Call light w/in reach.   Will continue to monitor throughout shift.

## 2022-11-17 NOTE — NURSING
Pt transferred from ICU to telemetry room 250. Report received from TIFFANY Mckeon. Agree with previous RN assessment.

## 2022-11-17 NOTE — PROGRESS NOTES
[facility] Medicine  Progress Note    Patient Name: Fransisco Durand  MRN: [id number]  Patient Class: IP- Inpatient   Admission Date: 11/14/2022  Length of Stay: 2 days  Attending Physician: Mayco Hickey MD  Primary Care Provider: [facility]        Subjective:     Principal Problem:Acute blood loss anemia        HPI:  Mr. Durand is a 72 year-old M with CHF, DM, COPD, who initially presented to [facility] ED on the evening of 11/14 with complaints of frontal headache, which started 3-4 hours PTA. He took his BP meds prior to arrival, and found to have hypotension with BP 78/47, . EKG noted with RBBB, LAHB. Central line place in ED and he was initiated on levophed. Cardiology consulted. Pt was able to be weaned off levophed. Noted Hb 11.1, [facility]  Also noted BUN 43, Cr 1.7, positive stool occult. Per patient he last had colonoscopy done a few years ago at [facility], however he does not recall any abnormal result. He is on ASA & Plavix daily, denies any NSAID use. Cardiology evaluated pt and recommended medical admission as they were able to confirm that RBBB was present on prior EKG obtained at [facility]. Initially was admitted to telemetry, however soon after he arrived in his room, rapid response was called due to hematemesis & hematochezia. Developed recurrence of hypotension shortly after repeat labs obtained showed Hb had dropped to 8.9. Subsequently was transferred to ICU with GI consult placed.    PCP: [facility]  Cardiology: Dr. Michele Wyman        Overview/Hospital Course:  Admitted for upper GI bleed. EGD: Three non-bleeding cratered gastric ulcers with a flat pigmented spot (Matt Class IIc) and the other with pigmented material were found in the gastric fundus and on the lesser curvature of the stomach. The largest lesion was 10 mm in largest dimension  11/17: hgb 7.9 this am. Asymptomatic, no signs of bleeding. Will discuss with  GI. Cont CLD. Trend h/h q8h.       Interval History: hgb 7.9 this am. Asymptomatic, no signs of bleeding. Will discuss with GI. Cont CLD. Trend h/h q8h.     Review of Systems   Constitutional:  Negative for fatigue and fever.   HENT:  Negative for sinus pressure.    Eyes:  Negative for visual disturbance.   Respiratory:  Negative for shortness of breath.    Cardiovascular:  Negative for chest pain.   Gastrointestinal:  Negative for nausea and vomiting.   Genitourinary:  Negative for difficulty urinating.   Musculoskeletal:  Negative for back pain.   Skin:  Negative for rash.   Neurological:  Negative for headaches.   Psychiatric/Behavioral:  Negative for confusion.    Objective:     Vital Signs (Most Recent):  Temp: 98.3 °F (36.8 °C) (11/17/22 0804)  Pulse: 85 (11/17/22 0808)  Resp: 18 (11/17/22 0808)  BP: (!) 112/57 (11/17/22 0804)  SpO2: 97 % (11/17/22 0808)   Vital Signs (24h Range):  Temp:  [97.7 °F (36.5 °C)-98.4 °F (36.9 °C)] 98.3 °F (36.8 °C)  Pulse:  [] 85  Resp:  [16-28] 18  SpO2:  [93 %-99 %] 97 %  BP: ()/(56-69) 112/57     Weight: 125.9 kg (277 lb 9 oz)  Body mass index is 42.2 kg/m².    Intake/Output Summary (Last 24 hours) at 11/17/2022 1042  Last data filed at 11/17/2022 0359  Gross per 24 hour   Intake 570 ml   Output 1450 ml   Net -880 ml      Physical Exam  Constitutional:       General: He is not in acute distress.     Appearance: He is well-developed. He is not diaphoretic.   HENT:      Head: Normocephalic and atraumatic.   Eyes:      Pupils: Pupils are equal, round, and reactive to light.   Cardiovascular:      Rate and Rhythm: Normal rate and regular rhythm.      Heart sounds: Normal heart sounds. No murmur heard.    No friction rub. No gallop.   Pulmonary:      Effort: Pulmonary effort is normal. No respiratory distress.      Breath sounds: Normal breath sounds. No stridor. No wheezing or rales.   Abdominal:      General: Bowel sounds are normal. There is no distension.       Palpations: Abdomen is soft. There is no mass.      Tenderness: There is no abdominal tenderness. There is no guarding.   Skin:     General: Skin is warm.      Findings: No erythema.   Neurological:      Mental Status: He is alert and oriented to person, place, and time.       Significant Labs: All pertinent labs within the past 24 hours have been reviewed.    Significant Imaging: I have reviewed all pertinent imaging results/findings within the past 24 hours.        Assessment/Plan:      * Acute blood loss anemia  -Secondary to melena & hematemesis  -Serial H/H  -Transfuse for Hb < 8 in setting of acute bleeding    GI consult, s/p EGD  Continue PPI  Hb stable    11/17:  Will cont ppi bid  H/h trending down  No active signs of bleeding  Will continue to monitor  Cont clear liquid diet   Trend h/h q8h  Discussed with GI      Upper GI bleed  S/p EGD  Continue PPI    CKD (chronic kidney disease) stage 3, GFR 30-59 ml/min  -Cr better than baseline  -Avoid nephrotoxins and renal dose meds as needed  -Monitor renal function, especially in setting of hypotension & acute blood loss anemia    CAD (coronary artery disease)  -Follows with [facility], Dr. Wyman for Cardiology  -Hold home ASA & Plavix given acute GI bleed  -Hold home Bumex, Toprol due to hypotension  -Evaluated by Dr. Whyte in ED  -TTE showed LVEF 35% with grade I diastolic dysfunction    Primary hypertension  -Hold home antihypertensives due to hypotension from acute Gi blood loss    Type 2 diabetes mellitus, without long-term current use of insulin  Patient's FSGs are controlled on current medication regimen.  Last A1c reviewed-   Lab Results   Component Value Date    HGBA1C 6.5 (H) 02/20/2021     Most recent fingerstick glucose reviewed-   Recent Labs   Lab 11/15/22  1311 11/15/22  1509 11/15/22  1657   POCTGLUCOSE 72 81 144*     Current correctional scale  Low  Start anti-hyperglycemic dose as follows-   Antihyperglycemics (From admission, onward)    Start      Stop Route Frequency Ordered    11/15/22 1503  insulin aspart U-100 pen 0-5 Units         -- SubQ Before meals & nightly PRN 11/15/22 1404        Hold Oral hypoglycemics while patient is in the hospital.    COPD (chronic obstructive pulmonary disease)  -Not in acute exacerbation  -Continue home inhalers    Chronic combined systolic and diastolic heart failure  -Appears compensated at this time  -Hold home Bumex, Entresto due to hypotension  -Daily weights  -Strict I&Os    Patient is identified as having Combined Systolic and Diastolic heart failure that is Chronic. CHF is currently controlled. Latest ECHO performed and demonstrates- Results for orders placed during the hospital encounter of 11/14/22    Echo    Interpretation Summary  · Concentric hypertrophy and moderately decreased systolic function.  · The estimated ejection fraction is 35%.  · Grade I left ventricular diastolic dysfunction.  · There is mild-to-moderate aortic valve stenosis.  · Aortic valve area is 1.20 cm2; peak velocity is 2.89 m/s; mean gradient is 25 mmHg.  · Mild tricuspid regurgitation.          VTE Risk Mitigation (From admission, onward)         Ordered     Place sequential compression device  Until discontinued         11/15/22 1404     IP VTE HIGH RISK PATIENT  Once         11/15/22 1404     Place sequential compression device  Until discontinued         11/15/22 1232                Discharge Planning   GITA:      Code Status: Full Code   Is the patient medically ready for discharge?:     Reason for patient still in hospital (select all that apply): Patient trending condition  Discharge Plan A: Demetrius Hickey MD  Department of Hospital Medicine   [facility] Tele[facility] ([facility])

## 2022-11-17 NOTE — ASSESSMENT & PLAN NOTE
-Secondary to melena & hematemesis  -Serial H/H  -Transfuse for Hb < 8 in setting of acute bleeding    GI consult, s/p EGD  Continue PPI  Hb stable    11/17:  Will cont ppi bid  H/h trending down  No active signs of bleeding  Will continue to monitor  Cont clear liquid diet   Trend h/h q8h  Discussed with GI

## 2022-11-17 NOTE — SUBJECTIVE & OBJECTIVE
Subjective:     Interval History: s/p EGD yesterday with non bleeding ulcers. Clot material noted. Clips placed and injected with epi. Today, he has no complaints. Denies abdominal pain, nausea, vomiting, overt bleeding. Has not had a BM in the past couple of days, per patient. Morning labs show hgb dropped to 7.9 from 9.0 overnight.    Review of Systems   Constitutional:  Negative for activity change, appetite change, chills, diaphoresis, fatigue and fever.   HENT:  Negative for trouble swallowing.    Respiratory:  Positive for shortness of breath (chronic).    Cardiovascular:  Positive for leg swelling. Negative for chest pain.   Gastrointestinal:  Negative for abdominal distention, abdominal pain, anal bleeding, blood in stool, diarrhea, nausea and vomiting. Constipation: no BM in a few days per patient.  Musculoskeletal:  Negative for back pain.   Neurological:  Negative for dizziness, weakness and light-headedness.   Psychiatric/Behavioral:  Negative for dysphoric mood. The patient is not nervous/anxious.    Objective:     Vital Signs (Most Recent):  Temp: 98.3 °F (36.8 °C) (11/17/22 0804)  Pulse: 85 (11/17/22 0808)  Resp: 18 (11/17/22 0808)  BP: (!) 112/57 (11/17/22 0804)  SpO2: 97 % (11/17/22 0808)   Vital Signs (24h Range):  Temp:  [97.7 °F (36.5 °C)-98.4 °F (36.9 °C)] 98.3 °F (36.8 °C)  Pulse:  [] 85  Resp:  [16-28] 18  SpO2:  [93 %-99 %] 97 %  BP: ()/(56-69) 112/57     Weight: 125.9 kg (277 lb 9 oz) (11/17/22 0529)  Body mass index is 42.2 kg/m².      Intake/Output Summary (Last 24 hours) at 11/17/2022 1054  Last data filed at 11/17/2022 0359  Gross per 24 hour   Intake 570 ml   Output 1450 ml   Net -880 ml       Lines/Drains/Airways       Drain  Duration             Male External Urinary Catheter 11/15/22 1945 Other (Comment) 1 day              Peripheral Intravenous Line  Duration                  Peripheral IV - Single Lumen 11/14/22 2128 18 G Right Antecubital 2 days                     Physical Exam  Constitutional:       General: He is not in acute distress.     Appearance: Normal appearance. He is obese. He is not ill-appearing, toxic-appearing or diaphoretic.   HENT:      Head: Normocephalic and atraumatic.   Eyes:      Extraocular Movements: Extraocular movements intact.   Cardiovascular:      Rate and Rhythm: Normal rate and regular rhythm.      Heart sounds: Murmur heard.   Pulmonary:      Effort: Pulmonary effort is normal. No respiratory distress.      Comments: Nasal cannula  Abdominal:      General: Bowel sounds are normal. There is no distension.      Palpations: Abdomen is soft.      Tenderness: There is no abdominal tenderness. There is no guarding.   Musculoskeletal:         General: Normal range of motion.      Cervical back: Normal range of motion.      Right lower leg: Edema present.      Left lower leg: Edema present.   Skin:     General: Skin is warm and dry.   Neurological:      Mental Status: He is alert and oriented to person, place, and time.       Significant Labs:  CBC:   Recent Labs   Lab 11/15/22  1809 11/16/22  0331 11/17/22  0936   WBC 12.70 11.75 10.13   HGB 7.2* 9.0* 7.9*   HCT 22.9* 28.2* 25.1*   * 147* 147*     CMP:   Recent Labs   Lab 11/16/22  0331   GLU 98   CALCIUM 7.5*      K 4.6   CO2 25      BUN 38*   CREATININE 1.1     Coagulation: No results for input(s): PT, INR, APTT in the last 48 hours.      Significant Imaging:  Imaging results within the past 24 hours have been reviewed.

## 2022-11-17 NOTE — ASSESSMENT & PLAN NOTE
-s/p EGD with gastric ulcers with no active bleeding, but clot material noted.   -Hgb this AM shows decrease from 9.0 yesterday to 7.9.  -Denies overt bleeding.  -Discussed with HM and Dr. Whitten. Continue to monitor H/H for now. Transfuse as needed.  -Do not advance diet yet.   -Keep on Protonix. Upon d/c, will need to be on Protonix BID x 2 months with repeat EGD in 2 months. Outpatient follow up in 2 weeks.  -No NSAIDs

## 2022-11-17 NOTE — PLAN OF CARE
Pt AAOx4. NSR-ST on the heart monitor. On 2L NC; tolerating well. Male external catheter remains in place. Pt turned and repositioned with use of pillows. 18 G PIV in right AC CDI with no redness, swelling, warmth, or drainage saline locked. Blood glucose monitored. Bed low, wheels locked, alarms audible. Plan of care reviewed. Vital signs monitored. Fall precautions in place. Pain assessed. Safety promoted. Infection risks reduced.

## 2022-11-18 NOTE — PROGRESS NOTES
[facility] ([facility])  Gastroenterology  Progress Note    Patient Name: Fransisco Durand  MRN: [id number]  Admission Date: 11/14/2022  Hospital Length of Stay: 3 days  Code Status: Full Code   Attending Provider: Mayco Hickey MD  Consulting Provider: Missy Shane PA-C  Primary Care Physician: [facility]  Principal Problem: Acute blood loss anemia      Subjective:     Interval History: Patient resting comfortably. No acute distress. Remains free of complaints. Denies any overt bleeding. Hgb currently at 7.3.    Review of Systems   Constitutional:  Negative for appetite change, chills, diaphoresis, fatigue and fever.   HENT:  Negative for trouble swallowing.    Respiratory:  Negative for cough and shortness of breath.    Cardiovascular:  Negative for chest pain.   Gastrointestinal:  Positive for constipation (no BM since admit). Negative for abdominal pain, blood in stool, diarrhea, nausea and vomiting.   Neurological:  Negative for dizziness, syncope, weakness and light-headedness.   Psychiatric/Behavioral:  Negative for confusion and dysphoric mood. The patient is not nervous/anxious.    Objective:     Vital Signs (Most Recent):  Temp: 97.9 °F (36.6 °C) (11/18/22 0738)  Pulse: 91 (11/18/22 0738)  Resp: 18 (11/18/22 0738)  BP: 108/62 (11/18/22 0738)  SpO2: (!) 94 % (11/18/22 0738)   Vital Signs (24h Range):  Temp:  [97.4 °F (36.3 °C)-98.6 °F (37 °C)] 97.9 °F (36.6 °C)  Pulse:  [75-95] 91  Resp:  [16-20] 18  SpO2:  [90 %-97 %] 94 %  BP: ()/(51-62) 108/62     Weight: (!) 145.1 kg (319 lb 14.2 oz) (11/18/22 0030)  Body mass index is 48.64 kg/m².      Intake/Output Summary (Last 24 hours) at 11/18/2022 0926  Last data filed at 11/18/2022 0917  Gross per 24 hour   Intake 1400 ml   Output 1100 ml   Net 300 ml       Lines/Drains/Airways       Drain  Duration             Male External Urinary Catheter 11/15/22 1945 Other (Comment) 2 days              Peripheral Intravenous Line  Duration                   Peripheral IV - Single Lumen 11/14/22 2128 18 G Right Antecubital 3 days                    Physical Exam  Constitutional:       General: He is not in acute distress.     Appearance: Normal appearance. He is obese. He is not ill-appearing, toxic-appearing or diaphoretic.   HENT:      Head: Normocephalic and atraumatic.   Eyes:      Extraocular Movements: Extraocular movements intact.   Cardiovascular:      Rate and Rhythm: Normal rate and regular rhythm.      Heart sounds: Murmur heard.   Pulmonary:      Effort: Pulmonary effort is normal. No respiratory distress.      Breath sounds: Wheezing (mild, left lung field) present.   Abdominal:      General: Bowel sounds are normal. There is no distension.      Palpations: Abdomen is soft.      Tenderness: There is no abdominal tenderness. There is no guarding.   Musculoskeletal:         General: Normal range of motion.      Cervical back: Normal range of motion.      Right lower leg: Edema present.      Left lower leg: Edema present.   Skin:     General: Skin is warm and dry.   Neurological:      General: No focal deficit present.      Mental Status: He is alert and oriented to person, place, and time.       Significant Labs:  CBC:   Recent Labs   Lab 11/17/22  0936 11/17/22  1535 11/18/22  0244 11/18/22  0533 11/18/22  0803   WBC 10.13  --   --  7.99  --    HGB 7.9*   < > 7.4* 7.3* 7.9*   HCT 25.1*   < > 22.9* 23.2* 25.1*   *  --   --  159  --     < > = values in this interval not displayed.     CMP:   Recent Labs   Lab 11/18/22  0533   GLU 76   CALCIUM 7.7*   *   K 3.8   CO2 26      BUN 14   CREATININE 0.9     Coagulation: No results for input(s): PT, INR, APTT in the last 48 hours.      Significant Imaging:  Imaging results within the past 24 hours have been reviewed.    Assessment/Plan:     * Acute blood loss anemia  -s/p EGD with non bleeding ulcers.   -Gradual drop in hemoglobin to 7.3. Patient continues to deny overt bleeding and has no  complaints.  -Continue with Protonix 40mg BID.  -Will continue to monitor H/H. Transfuse as needed to keep >7. If H/H continues to drop, may need repeat EGD while inpatient. If labs remains stable, can consider close outpatient follow up.  -Can remains on clears for now.   -Continue to hold anticoagulation.      Upper GI bleed  -See anemia plan.        Thank you for your consult. I will follow-up with patient. Please contact us if you have any additional questions.    Missy Shane PA-C  Gastroenterology  [facility] - Telemetry ([facility])

## 2022-11-18 NOTE — ASSESSMENT & PLAN NOTE
-Secondary to melena & hematemesis  -Serial H/H  -Transfuse for Hb < 8 in setting of acute bleeding    GI consult, s/p EGD  Continue PPI  Hb stable    11/17:  Will cont ppi bid  H/h trending down  No active signs of bleeding  Will continue to monitor  Cont clear liquid diet   Trend h/h q8h  Discussed with GI    11/18:  H/h stable  Will initiate diet  Possible dc tomorrow if h/h stable and tolerating diet  Discussed with GI

## 2022-11-18 NOTE — PHYSICIAN QUERY
PT Name: Fransisco Durand  MR #: [id number]     DOCUMENTATION CLARIFICATION      CDS: Eliazar ARREAGA,RN        Contact information:[email or web address]   This form is a permanent document in the medical record.    Query Date: November 17, 2022    By submitting this query, we are merely seeking further clarification of documentation to reflect the severity of illness of your patient. Please utilize your independent clinical judgment when addressing the question(s) below.     The Medical Record contains the following:   Indicators   Supporting Clinical Findings Location in Medical Record   x Gastrointestinal Ulcer Documented Three non-bleeding cratered gastric ulcers with a flat pigmented  spot (Matt Class IIc).    Gastric body ulcer with slight oozing due to manipulation near ulcer.     11/16 Upper GI Endoscopy   x EGD/Colonscopy Findings Findings:   The examined esophagus was normal.   Three non-bleeding cratered gastric ulcers with a flat pigmented  spot (Matt Class IIc) and the other with pigmented material were   found in the gastric fundus and on the lesser curvature of the stomach. The largest lesion was 10 mm in largest dimension. For hemostasis, three hemostatic clips were successfully placed on fundal ulcer. There was no bleeding at the end of the procedure.   Gastric body ulcer with slight oozing due to manipulation near ulcer. Area was successfully injected with 5 mL of a 1:10,000  solution of epinephrine for hemostasis.    The examined duodenum was normal.    The cardia and gastric fundus were normal on retroflexion.      11/16 Upper GI Endoscopy    Pathology Findings      Radiology Findings     x Treatment/Medication For hemostasis, three hemostatic clips were successfully placed on fundal ulcer. Area was successfully injected with 5 mL of a 1:10,000  solution of epinephrine for hemostasis.   Pantoprazole EC tablet 40 mg Oral 2 times daily   Pantoprazole injection 40 mg Intravenous 2 times  daily  Sucralfate 100 mg/ml suspension 1 g Oral Every 6 hours    11/16 Upper GI Endoscopy     11/17--->present MAR   11/15--->11/17 MAR    11/16---->present MAR   x Other Hgb: 11.1---->8.9----->7.2------>9.0---->7.9------>7.6  Hct:  34.8--->28.2---->22.9---->28.2--->25.1---->24.2  11/14---->11/17 Labs      Please further specify the acuity of the  gastric ulcer:    [  x ] Acute   [   ] Chronic   [  x ] Unspecified   [   ] Other (please specify): ___________   [   ] Clinically Undetermined     Please document in your progress notes daily for the duration of treatment until resolved, and include in your discharge summary.  Form No. 01533

## 2022-11-18 NOTE — PLAN OF CARE
Pt AAOx4. NSR on the heart monitor. Remains on 2L NC; tolerating well. Male external catheter changed and in place. Pt turned and repositioned with use of pillows. 18 G PIV in right AC CDI with no redness, swelling, warmth, or drainage saline locked. Blood glucose monitored. Bed low, wheels locked, alarms audible. Plan of care reviewed. Vital signs monitored. Fall precautions in place. Pain assessed. Safety promoted. Infection risks reduced.

## 2022-11-18 NOTE — SUBJECTIVE & OBJECTIVE
Interval History: H/H stable. Discussed with GI. Will start regular diet. Cont to monitor h/h. Possible dc tomorrow if h/h stable and tolerating diet.     Review of Systems   Constitutional:  Negative for fatigue and fever.   HENT:  Negative for sinus pressure.    Eyes:  Negative for visual disturbance.   Respiratory:  Negative for shortness of breath.    Cardiovascular:  Negative for chest pain.   Gastrointestinal:  Negative for nausea and vomiting.   Genitourinary:  Negative for difficulty urinating.   Musculoskeletal:  Negative for back pain.   Skin:  Negative for rash.   Neurological:  Negative for headaches.   Psychiatric/Behavioral:  Negative for confusion.    Objective:     Vital Signs (Most Recent):  Temp: 97.4 °F (36.3 °C) (11/18/22 1208)  Pulse: 91 (11/18/22 1208)  Resp: 16 (11/18/22 1208)  BP: 102/61 (11/18/22 1208)  SpO2: 96 % (11/18/22 1208) Vital Signs (24h Range):  Temp:  [97.4 °F (36.3 °C)-98.6 °F (37 °C)] 97.4 °F (36.3 °C)  Pulse:  [75-93] 91  Resp:  [16-20] 16  SpO2:  [91 %-97 %] 96 %  BP: ()/(51-62) 102/61     Weight: (!) 145.1 kg (319 lb 14.2 oz)  Body mass index is 48.64 kg/m².    Intake/Output Summary (Last 24 hours) at 11/18/2022 1304  Last data filed at 11/18/2022 1243  Gross per 24 hour   Intake 1960 ml   Output 1100 ml   Net 860 ml      Physical Exam  Constitutional:       General: He is not in acute distress.     Appearance: He is well-developed. He is not diaphoretic.   HENT:      Head: Normocephalic and atraumatic.   Eyes:      Pupils: Pupils are equal, round, and reactive to light.   Cardiovascular:      Rate and Rhythm: Normal rate and regular rhythm.      Heart sounds: Normal heart sounds. No murmur heard.    No friction rub. No gallop.   Pulmonary:      Effort: Pulmonary effort is normal. No respiratory distress.      Breath sounds: Normal breath sounds. No stridor. No wheezing or rales.   Abdominal:      General: Bowel sounds are normal. There is no distension.       Palpations: Abdomen is soft. There is no mass.      Tenderness: There is no abdominal tenderness. There is no guarding.   Skin:     General: Skin is warm.      Findings: No erythema.   Neurological:      Mental Status: He is alert and oriented to person, place, and time.       Significant Labs: All pertinent labs within the past 24 hours have been reviewed.    Significant Imaging: I have reviewed all pertinent imaging results/findings within the past 24 hours.

## 2022-11-18 NOTE — PROGRESS NOTES
[facility] Medicine  Progress Note    Patient Name: Fransisco Durand  MRN: [id number]  Patient Class: IP- Inpatient   Admission Date: 11/14/2022  Length of Stay: 3 days  Attending Physician: Mayco Hickey MD  Primary Care Provider: [facility]        Subjective:     Principal Problem:Acute blood loss anemia        HPI:  Mr. Durand is a 72 year-old M with CHF, DM, COPD, who initially presented to [facility] ED on the evening of 11/14 with complaints of frontal headache, which started 3-4 hours PTA. He took his BP meds prior to arrival, and found to have hypotension with BP 78/47, . EKG noted with RBBB, LAHB. Central line place in ED and he was initiated on levophed. Cardiology consulted. Pt was able to be weaned off levophed. Noted Hb 11.1[facility]  Also noted BUN 43, Cr 1.7, positive stool occult. Per patient he last had colonoscopy done a few years ago at [facility], however he does not recall any abnormal result. He is on ASA & Plavix daily, denies any NSAID use. Cardiology evaluated pt and recommended medical admission as they were able to confirm that RBBB was present on prior EKG obtained at [facility]. Initially was admitted to telemetry, however soon after he arrived in his room, rapid response was called due to hematemesis & hematochezia. Developed recurrence of hypotension shortly after repeat labs obtained showed Hb had dropped to 8.9. Subsequently was transferred to ICU with GI consult placed.    PCP: [facility]  Cardiology: Dr. Michele Wyman        Overview/Hospital Course:  Admitted for upper GI bleed. EGD: Three non-bleeding cratered gastric ulcers with a flat pigmented spot (Matt Class IIc) and the other with pigmented material were found in the gastric fundus and on the lesser curvature of the stomach. The largest lesion was 10 mm in largest dimension  11/17: hgb 7.9 this am. Asymptomatic, no signs of bleeding. Will discuss with  GI. Cont CLD. Trend h/h q8h.   11/18: H/H stable. Discussed with GI. Will start regular diet. Cont to monitor h/h. Possible dc tomorrow if h/h stable and tolerating diet.       Interval History: H/H stable. Discussed with GI. Will start regular diet. Cont to monitor h/h. Possible dc tomorrow if h/h stable and tolerating diet.     Review of Systems   Constitutional:  Negative for fatigue and fever.   HENT:  Negative for sinus pressure.    Eyes:  Negative for visual disturbance.   Respiratory:  Negative for shortness of breath.    Cardiovascular:  Negative for chest pain.   Gastrointestinal:  Negative for nausea and vomiting.   Genitourinary:  Negative for difficulty urinating.   Musculoskeletal:  Negative for back pain.   Skin:  Negative for rash.   Neurological:  Negative for headaches.   Psychiatric/Behavioral:  Negative for confusion.    Objective:     Vital Signs (Most Recent):  Temp: 97.4 °F (36.3 °C) (11/18/22 1208)  Pulse: 91 (11/18/22 1208)  Resp: 16 (11/18/22 1208)  BP: 102/61 (11/18/22 1208)  SpO2: 96 % (11/18/22 1208) Vital Signs (24h Range):  Temp:  [97.4 °F (36.3 °C)-98.6 °F (37 °C)] 97.4 °F (36.3 °C)  Pulse:  [75-93] 91  Resp:  [16-20] 16  SpO2:  [91 %-97 %] 96 %  BP: ()/(51-62) 102/61     Weight: (!) 145.1 kg (319 lb 14.2 oz)  Body mass index is 48.64 kg/m².    Intake/Output Summary (Last 24 hours) at 11/18/2022 1304  Last data filed at 11/18/2022 1243  Gross per 24 hour   Intake 1960 ml   Output 1100 ml   Net 860 ml      Physical Exam  Constitutional:       General: He is not in acute distress.     Appearance: He is well-developed. He is not diaphoretic.   HENT:      Head: Normocephalic and atraumatic.   Eyes:      Pupils: Pupils are equal, round, and reactive to light.   Cardiovascular:      Rate and Rhythm: Normal rate and regular rhythm.      Heart sounds: Normal heart sounds. No murmur heard.    No friction rub. No gallop.   Pulmonary:      Effort: Pulmonary effort is normal. No respiratory  distress.      Breath sounds: Normal breath sounds. No stridor. No wheezing or rales.   Abdominal:      General: Bowel sounds are normal. There is no distension.      Palpations: Abdomen is soft. There is no mass.      Tenderness: There is no abdominal tenderness. There is no guarding.   Skin:     General: Skin is warm.      Findings: No erythema.   Neurological:      Mental Status: He is alert and oriented to person, place, and time.       Significant Labs: All pertinent labs within the past 24 hours have been reviewed.    Significant Imaging: I have reviewed all pertinent imaging results/findings within the past 24 hours.          Assessment/Plan:      * Acute blood loss anemia  -Secondary to melena & hematemesis  -Serial H/H  -Transfuse for Hb < 8 in setting of acute bleeding    GI consult, s/p EGD  Continue PPI  Hb stable    11/17:  Will cont ppi bid  H/h trending down  No active signs of bleeding  Will continue to monitor  Cont clear liquid diet   Trend h/h q8h  Discussed with GI    11/18:  H/h stable  Will initiate diet  Possible dc tomorrow if h/h stable and tolerating diet  Discussed with GI      Upper GI bleed  S/p EGD  Continue PPI    CKD (chronic kidney disease) stage 3, GFR 30-59 ml/min  -Cr better than baseline  -Avoid nephrotoxins and renal dose meds as needed  -Monitor renal function, especially in setting of hypotension & acute blood loss anemia    CAD (coronary artery disease)  -Follows with [facility] for Cardiology  -Hold home ASA & Plavix given acute GI bleed  -Hold home Bumex, Toprol due to hypotension  -Evaluated by Dr. Whyte in ED  -TTE showed LVEF 35% with grade I diastolic dysfunction    Primary hypertension  -Hold home antihypertensives due to hypotension from acute Gi blood loss    Type 2 diabetes mellitus, without long-term current use of insulin  Patient's FSGs are controlled on current medication regimen.  Last A1c reviewed-   Lab Results   Component Value Date    HGBA1C 6.5 (H)  02/20/2021     Most recent fingerstick glucose reviewed-   Recent Labs   Lab 11/15/22  1311 11/15/22  1509 11/15/22  1657   POCTGLUCOSE 72 81 144*     Current correctional scale  Low  Start anti-hyperglycemic dose as follows-   Antihyperglycemics (From admission, onward)    Start     Stop Route Frequency Ordered    11/15/22 1503  insulin aspart U-100 pen 0-5 Units         -- SubQ Before meals & nightly PRN 11/15/22 1404        Hold Oral hypoglycemics while patient is in the hospital.    COPD (chronic obstructive pulmonary disease)  -Not in acute exacerbation  -Continue home inhalers    Chronic combined systolic and diastolic heart failure  -Appears compensated at this time  -Hold home Bumex, Entresto due to hypotension  -Daily weights  -Strict I&Os    Patient is identified as having Combined Systolic and Diastolic heart failure that is Chronic. CHF is currently controlled. Latest ECHO performed and demonstrates- Results for orders placed during the hospital encounter of 11/14/22    Echo    Interpretation Summary  · Concentric hypertrophy and moderately decreased systolic function.  · The estimated ejection fraction is 35%.  · Grade I left ventricular diastolic dysfunction.  · There is mild-to-moderate aortic valve stenosis.  · Aortic valve area is 1.20 cm2; peak velocity is 2.89 m/s; mean gradient is 25 mmHg.  · Mild tricuspid regurgitation.          VTE Risk Mitigation (From admission, onward)         Ordered     Place sequential compression device  Until discontinued         11/15/22 1404     IP VTE HIGH RISK PATIENT  Once         11/15/22 1404     Place sequential compression device  Until discontinued         11/15/22 1232                Discharge Planning   GITA:      Code Status: Full Code   Is the patient medically ready for discharge?:     Reason for patient still in hospital (select all that apply): Patient trending condition  Discharge Plan A: Home                  Mayco Hickey MD  Department of Hospital  Medicine   [facility] - Tele[facility] ([facility])

## 2022-11-18 NOTE — SUBJECTIVE & OBJECTIVE
Subjective:     Interval History: Patient resting comfortably. No acute distress. Remains free of complaints. Denies any overt bleeding. Hgb currently at 7.3.    Review of Systems   Constitutional:  Negative for appetite change, chills, diaphoresis, fatigue and fever.   HENT:  Negative for trouble swallowing.    Respiratory:  Negative for cough and shortness of breath.    Cardiovascular:  Negative for chest pain.   Gastrointestinal:  Positive for constipation (no BM since admit). Negative for abdominal pain, blood in stool, diarrhea, nausea and vomiting.   Neurological:  Negative for dizziness, syncope, weakness and light-headedness.   Psychiatric/Behavioral:  Negative for confusion and dysphoric mood. The patient is not nervous/anxious.    Objective:     Vital Signs (Most Recent):  Temp: 97.9 °F (36.6 °C) (11/18/22 0738)  Pulse: 91 (11/18/22 0738)  Resp: 18 (11/18/22 0738)  BP: 108/62 (11/18/22 0738)  SpO2: (!) 94 % (11/18/22 0738)   Vital Signs (24h Range):  Temp:  [97.4 °F (36.3 °C)-98.6 °F (37 °C)] 97.9 °F (36.6 °C)  Pulse:  [75-95] 91  Resp:  [16-20] 18  SpO2:  [90 %-97 %] 94 %  BP: ()/(51-62) 108/62     Weight: (!) 145.1 kg (319 lb 14.2 oz) (11/18/22 0030)  Body mass index is 48.64 kg/m².      Intake/Output Summary (Last 24 hours) at 11/18/2022 0926  Last data filed at 11/18/2022 0917  Gross per 24 hour   Intake 1400 ml   Output 1100 ml   Net 300 ml       Lines/Drains/Airways       Drain  Duration             Male External Urinary Catheter 11/15/22 1945 Other (Comment) 2 days              Peripheral Intravenous Line  Duration                  Peripheral IV - Single Lumen 11/14/22 2128 18 G Right Antecubital 3 days                    Physical Exam  Constitutional:       General: He is not in acute distress.     Appearance: Normal appearance. He is obese. He is not ill-appearing, toxic-appearing or diaphoretic.   HENT:      Head: Normocephalic and atraumatic.   Eyes:      Extraocular Movements:  Extraocular movements intact.   Cardiovascular:      Rate and Rhythm: Normal rate and regular rhythm.      Heart sounds: Murmur heard.   Pulmonary:      Effort: Pulmonary effort is normal. No respiratory distress.      Breath sounds: Wheezing (mild, left lung field) present.   Abdominal:      General: Bowel sounds are normal. There is no distension.      Palpations: Abdomen is soft.      Tenderness: There is no abdominal tenderness. There is no guarding.   Musculoskeletal:         General: Normal range of motion.      Cervical back: Normal range of motion.      Right lower leg: Edema present.      Left lower leg: Edema present.   Skin:     General: Skin is warm and dry.   Neurological:      General: No focal deficit present.      Mental Status: He is alert and oriented to person, place, and time.       Significant Labs:  CBC:   Recent Labs   Lab 11/17/22  0936 11/17/22  1535 11/18/22  0244 11/18/22  0533 11/18/22  0803   WBC 10.13  --   --  7.99  --    HGB 7.9*   < > 7.4* 7.3* 7.9*   HCT 25.1*   < > 22.9* 23.2* 25.1*   *  --   --  159  --     < > = values in this interval not displayed.     CMP:   Recent Labs   Lab 11/18/22  0533   GLU 76   CALCIUM 7.7*   *   K 3.8   CO2 26      BUN 14   CREATININE 0.9     Coagulation: No results for input(s): PT, INR, APTT in the last 48 hours.      Significant Imaging:  Imaging results within the past 24 hours have been reviewed.

## 2022-11-18 NOTE — ASSESSMENT & PLAN NOTE
-s/p EGD with non bleeding ulcers.   -Gradual drop in hemoglobin to 7.3. Patient continues to deny overt bleeding and has no complaints.  -Continue with Protonix 40mg BID.  -Will continue to monitor H/H. Transfuse as needed to keep >7. If H/H continues to drop, may need repeat EGD while inpatient. If labs remains stable, can consider close outpatient follow up.  -Can remains on clears for now.   -Continue to hold anticoagulation.

## 2022-11-19 NOTE — DISCHARGE INSTRUCTIONS
[facility] a complete blood count (CBC) to monitor your hemoglobin and hematocrit since you were admitted for bleeding.    You have been started on new medication(s) from this hospitalization. Please take as directed and schedule hospital follow up appointment with your primary providers.    Please review your discharge paperwork, as some of your medications will be restarted at a later date.    There is an order for lab collection at the [facility] lab if you are unable to have labs drawn [facility] the [facility].    A nurse practitioner may be contacting you to assess your status post-hospitalization.   Homehealth with physical/occupational therapy order has been ordered. Someone will be in contact.

## 2022-11-19 NOTE — PLAN OF CARE
Pt AAOx4. NSR on the heart monitor. On room air; tolerating well. Male external catheter in place. Pt turned and repositioned with use of pillows. 18 G PIV in right AC CDI with no redness, swelling, warmth, or drainage saline locked. Blood glucose monitored. Bed low, wheels locked, alarms audible. Plan of care reviewed. Vital signs monitored. Fall precautions in place. Pain assessed. Safety promoted. Infection risks reduced.

## 2022-11-19 NOTE — PLAN OF CARE
Problem: Adult Inpatient Plan of Care  Goal: Plan of Care Review  Outcome: Ongoing, Progressing  Goal: Patient-Specific Goal (Individualized)  Outcome: Ongoing, Progressing  Goal: Absence of Hospital-Acquired Illness or Injury  Outcome: Ongoing, Progressing  Goal: Optimal Comfort and Wellbeing  Outcome: Ongoing, Progressing  Goal: Readiness for Transition of Care  Outcome: Ongoing, Progressing     Problem: Bariatric Environmental Safety  Goal: Safety Maintained with Care  Outcome: Ongoing, Progressing     Problem: Infection  Goal: Absence of Infection Signs and Symptoms  Outcome: Ongoing, Progressing     Problem: Diabetes Comorbidity  Goal: Blood Glucose Level Within Targeted Range  Outcome: Ongoing, Progressing     Problem: Fall Injury Risk  Goal: Absence of Fall and Fall-Related Injury  Outcome: Ongoing, Progressing

## 2022-11-19 NOTE — DISCHARGE SUMMARY
[facility] ([facility] Medicine  Discharge Summary      Patient Name: Fransisco Durand  MRN: [id number]  YUDITH: [id number]  Patient Class: IP- Inpatient  Admission Date: 11/14/2022  Hospital Length of Stay: 4 days  Discharge Date and Time:  11/19/2022 11:17 AM  Attending Physician: Trino Valera MD   Discharging Provider: Trino Valera MD  Primary Care Provider: [facility]    Primary Care Team: Networked reference to record PCT     HPI:   Mr. Durand is a 72 year-old M with CHF, DM, COPD, who initially presented to [facility] ED on the evening of 11/14 with complaints of frontal headache, which started 3-4 hours PTA. He took his BP meds prior to arrival, and found to have hypotension with BP 78/47, . EKG noted with RBBB, LAHB. Central line place in ED and he was initiated on levophed. Cardiology consulted. Pt was able to be weaned off levophed. Noted Hb 11.1, and admitted to melanotic stool x1 in ED prior to transfer.  Also noted BUN 43, Cr 1.7, positive stool occult. Per patient [facility] however he does not recall any abnormal result. He is on ASA & Plavix daily, denies any NSAID use. Cardiology evaluated pt and recommended medical admission as they were able to confirm that RBBB was present on prior EKG obtained at [facility]. Initially was admitted to telemetry, however soon after he arrived in his room, rapid response was called due to hematemesis & hematochezia. Developed recurrence of hypotension shortly after repeat labs obtained showed Hb had dropped to 8.9. Subsequently was transferred to ICU with GI consult placed.    PCP: [facility]  Cardiology: Dr. Michele Wyman        Procedure(s) (LRB):  EGD (ESOPHAGOGASTRODUODENOSCOPY) (N/A)      Hospital Course:   Admitted for upper GI bleed. EGD: Three non-bleeding cratered gastric ulcers with a flat pigmented spot (Matt Class IIc) and the other with pigmented material were found in the gastric fundus  and on the lesser curvature of the stomach. The largest lesion was 10 mm in largest dimension  11/17: hgb 7.9 this am. Asymptomatic, no signs of bleeding. Will discuss with GI. Cont CLD. Trend h/h q8h.   11/18: H/H stable. Discussed with GI. Will start regular diet. Cont to monitor h/h. Possible dc tomorrow if h/h stable and tolerating diet.     11/19  Hb/hct remains stable. +bowel movement. States appearance of stool is dark but feels more residual in nature. Endorses symptom improvement. [facility] Feels ready for discharge. Protonix and carafate sent to pharm on file. Gastroenterology follow up outpatient in 2 weeks.    On discharge patient's aspirin and plavix held until repeat hb/hct collected outpatient. Advised to schedule hospital follow up visit with primary cardiologist.    Patient hypotensive. Entresto and beta blocker held on discharge and to be resumed next week. Congestive heart failure clinic referral placed on discharge.     Patient seen and evaluated by me. Patient was determined to be suitable for d/c. Patient deemed stable for discharge to home with homehealth physical/occupational therapy and lab collection with nurse practitioner to visit home.         Goals of Care Treatment Preferences:  Code Status: Full Code      Consults:   Consults (From admission, onward)        Status Ordering Provider     Inpatient consult to Social Work/Case Management  Once        Provider:  (Not yet assigned)    Acknowledged LATA FUENTES     IP consult to case management  Once        Provider:  (Not yet assigned)    Completed LATOYA SALINAS     Inpatient consult to Gastroenterology  Once        Provider:  (Not yet assigned)    Completed MARBELLA ALONSO     Inpatient consult to Cardiology  Once        Provider:  Yon Day MD    Completed ASHLEY MATSON JR          No new Assessment & Plan notes have been filed under this hospital service since the last note was generated.  Service:  Hospital Medicine    Final Active Diagnoses:    Diagnosis Date Noted POA    PRINCIPAL PROBLEM:  Acute blood loss anemia [D62] 11/15/2022 Yes    Upper GI bleed [K92.2] 11/15/2022 Yes    CAD (coronary artery disease) [I25.10] 02/19/2021 Yes    CKD (chronic kidney disease) stage 3, GFR 30-59 ml/min [N18.30] 02/19/2021 Yes    Primary hypertension [I10] 02/19/2021 Yes     Chronic    Type 2 diabetes mellitus, without long-term current use of insulin [E11.9] 02/19/2021 Yes    COPD (chronic obstructive pulmonary disease) [J44.9] 02/19/2021 Yes    Chronic combined systolic and diastolic heart failure [I50.42] 02/19/2021 Yes      Problems Resolved During this Admission:    Diagnosis Date Noted Date Resolved POA    Melena [K92.1] 11/15/2022 11/16/2022 Yes    Hypotension [I95.9] 11/15/2022 11/16/2022 Yes       Discharged Condition: stable    Disposition:     Follow Up:   Follow-up Information     [facility]. Schedule an appointment as soon as possible for a visit in 3 day(s).    Why: hospital follow up  Contact information:  [address] [facility] [address] [phone]             Michele Wyman MD. Schedule an appointment as soon as possible for a visit in 1 week(s).    Specialty: Cardiology  Why: hospital follow up  Contact information:  [address] [facility]  Cardiovascular [place] [facility] [address] [phone]             Missy Shane PA-C. Schedule an appointment as soon as possible for a visit in 2 week(s).    Specialty: Gastroenterology  Why: hospital follow up  Contact information:  [address] [place] [address] DR Melo PECK [id number] [phone]                       Patient Instructions:      Ambulatory referral/consult to Congestive Heart Failure Clinic   Standing Status: Future   Referral Priority: Routine Referral Type: Consultation   Referral Reason: Specialty Services Required   Requested Specialty: Cardiology   Number of Visits Requested: 1     Ambulatory  referral/consult to [facility]   Standing Status: Future   Referral Priority: Routine Referral Type: Consultation   Referral Reason: Specialty Services Required   Number of Visits Requested: 1     Ambulatory referral/consult to Home Health   Standing Status: Future   Referral Priority: Routine Referral Type: Home Health   Referral Reason: Specialty Services Required   Requested Specialty: Home Health Services   Number of Visits Requested: 1     Diet Cardiac     Diet diabetic     Activity as tolerated       Significant Diagnostic Studies: Labs:   CMP   Recent Labs   Lab 11/18/22  0533   *   K 3.8      CO2 26   GLU 76   BUN 14   CREATININE 0.9   CALCIUM 7.7*   ANIONGAP 6*   , CBC   Recent Labs   Lab 11/18/22  0533 11/18/22  0803 11/18/22  1802 11/18/22 2024 11/19/22  0806   WBC 7.99  --   --   --   --    HGB 7.3*   < > 8.6* 8.1* 8.5*   HCT 23.2*   < > 27.2* 24.7* 25.9*     --   --   --   --     < > = values in this interval not displayed.    and All labs within the past 24 hours have been reviewed  Microbiology:   Blood Culture   Lab Results   Component Value Date    LABBLOO No Growth to date 11/14/2022    LABBLOO No Growth to date 11/14/2022    LABBLOO No Growth to date 11/14/2022    LABBLOO No Growth to date 11/14/2022    LABBLOO No Growth to date 11/14/2022     Radiology: X-Ray: CXR: portable  CT scan: CT head wo contrast   Cardiac Graphics: ECG: RBBB and Echocardiogram:   Transthoracic echo (TTE) complete (Cupid Only):   Results for orders placed or performed during the hospital encounter of 11/14/22   Echo   Result Value Ref Range    BSA 2.47 m2    TDI SEPTAL 0.07 m/s    LV LATERAL E/E' RATIO 12.00 m/s    LV SEPTAL E/E' RATIO 10.29 m/s    LA WIDTH 3.10 cm    IVC diameter 1.98 cm    Left Ventricular Outflow Tract Mean Velocity 1.15 cm/s    Left Ventricular Outflow Tract Mean Gradient 5.29 mmHg    TV mean gradient 23 mmHg    TDI LATERAL 0.06 m/s    LVIDd 5.08 3.5  - 6.0 cm    IVS 1.50 (A) 0.6 - 1.1 cm    Posterior Wall 1.29 (A) 0.6 - 1.1 cm    Ao root annulus 3.40 cm    LVIDs 3.88 2.1 - 4.0 cm    FS 24 28 - 44 %    LA volume 42.95 cm3    STJ 3.54 cm    Ascending aorta 3.52 cm    LV mass 297.08 g    LA size 3.61 cm    TAPSE 1.80 cm    Left Ventricle Relative Wall Thickness 0.51 cm    AV regurgitation pressure 1/2 time [id number] ms    AV mean gradient 25 mmHg    AV valve area 1.20 cm2    AV Velocity Ratio 0.44     AV index (prosthetic) 0.44     MV valve area p 1/2 method 3.11 cm2    E/A ratio 0.82     Mean e' 0.07 m/s    E wave deceleration time 243.87 msec    IVRT 79.92 msec    LVOT diameter 1.86 cm    LVOT area 2.7 cm2    LVOT peak elpidio 1.26 m/s    LVOT peak VTI 27.00 cm    Ao peak elpidio 2.89 m/s    Ao VTI 60.9 cm    RVOT peak elpidio 0.85 m/s    RVOT peak VTI 16.1 cm    LVOT stroke volume 73.33 cm3    AV peak gradient 33 mmHg    PV mean gradient 1.98 mmHg    E/E' ratio 11.08 m/s    MV Peak E Elpidio 0.72 m/s    AR Max Elpidio 2.51 m/s    TR Max Elpidio 2.09 m/s    MV stenosis pressure 1/2 time 70.72 ms    MV Peak A Elpidio 0.88 m/s    LV Systolic Volume 64.91 mL    LV Systolic Volume Index 27.5 mL/m2    LV Diastolic Volume 122.94 mL    LV Diastolic Volume Index 52.09 mL/m2    LA Volume Index 18.2 mL/m2    LV Mass Index 126 g/m2    RA Major Axis 4.19 cm    Left Atrium Minor Axis 4.51 cm    Left Atrium Major Axis 4.52 cm    Triscuspid Valve Regurgitation Peak Gradient 17 mmHg    RA Width 3.00 cm    EF 35 %    Narrative    · Concentric hypertrophy and moderately decreased systolic function.  · The estimated ejection fraction is 35%.  · Grade I left ventricular diastolic dysfunction.  · There is mild-to-moderate aortic valve stenosis.  · Aortic valve area is 1.20 cm2; peak velocity is 2.89 m/s; mean gradient   is 25 mmHg.  · Mild tricuspid regurgitation.          Pending Diagnostic Studies:     Procedure Component Value Units Date/Time    Echo [[id number]]     Order Status: Sent Lab Status:  No result          Medications:  Reconciled Home Medications:      Medication List      START taking these medications    pantoprazole 40 MG tablet  Commonly known as: PROTONIX  Take 1 tablet (40 mg total) by mouth 2 (two) times daily.     sucralfate 100 mg/mL suspension  Commonly known as: CARAFATE  Take 10 mLs (1 g total) by mouth every 6 (six) hours. for 7 days        CHANGE how you take these medications    aspirin 81 MG Chew  Take 1 tablet (81 mg total) by mouth once daily.  Start taking on: November 22, 2022  What changed: These instructions start on November 22, 2022. If you are unsure what to do until then, ask your doctor or other care provider.     bumetanide 1 MG tablet  Commonly known as: BUMEX  Take 1 tablet (1 mg total) by mouth 2 (two) times a day.  Start taking on: November 21, 2022  What changed: These instructions start on November 21, 2022. If you are unsure what to do until then, ask your doctor or other care provider.     clopidogreL 75 mg tablet  Commonly known as: PLAVIX  Take 1 tablet (75 mg total) by mouth once daily.  Start taking on: November 22, 2022  What changed: These instructions start on November 22, 2022. If you are unsure what to do until then, ask your doctor or other care provider.     metoprolol succinate 25 MG 24 hr tablet  Commonly known as: TOPROL-XL  Take 1 tablet (25 mg total) by mouth once daily.  Start taking on: November 22, 2022  What changed: These instructions start on November 22, 2022. If you are unsure what to do until then, ask your doctor or other care provider.     potassium chloride 20 mEq  Commonly known as: K-TAB  Take 1 tablet (20 mEq total) by mouth 2 (two) times a day.  Start taking on: November 21, 2022  What changed:   · how much to take  · These instructions start on November 21, 2022. If you are unsure what to do until then, ask your doctor or other care provider.        CONTINUE taking these medications    * albuterol 5 mg/mL nebulizer  solution  Commonly known as: PROVENTIL  Take 2.5 mg by nebulization every 6 (six) hours as needed for Wheezing. Rescue     * albuterol 90 mcg/actuation inhaler  Commonly known as: PROVENTIL/VENTOLIN HFA  Inhale 2 puffs into the lungs every 6 (six) hours as needed.     atorvastatin 80 MG tablet  Commonly known as: LIPITOR  Take 1 tablet by mouth every evening.     budesonide-formoterol 160-4.5 mcg 160-4.5 mcg/actuation Hfaa  Commonly known as: SYMBICORT  Inhale 2 puffs into the lungs every 12 (twelve) hours. Controller     cyanocobalamin 1000 MCG tablet  Commonly known as: VITAMIN B-12  Take 1,000 mcg by mouth once daily.     ergocalciferol 50,000 unit Cap  Commonly known as: ERGOCALCIFEROL  Take 50,000 Units by mouth every 7 days.     LIDOcaine 5 %  Commonly known as: LIDODERM  Place 1 patch onto the skin once daily. Remove & Discard patch within 12 hours or as directed by MD     metFORMIN 500 MG ER 24hr tablet  Commonly known as: GLUCOPHAGE-XR  Take 500 mg by mouth every morning.     tiotropium 18 mcg inhalation capsule  Commonly known as: SPIRIVA  Inhale 18 mcg into the lungs once daily. Controller         * This list has 2 medication(s) that are the same as other medications prescribed for you. Read the directions carefully, and ask your doctor or other care provider to review them with you.            STOP taking these medications    sacubitriL-valsartan 24-26 mg per tablet  Commonly known as: ENTRESTO            Indwelling Lines/Drains at time of discharge:   Lines/Drains/Airways     Drain  Duration           Male External Urinary Catheter 11/15/22 1945 Other (Comment) 3 days                Time spent on the discharge of patient: 37 minutes         Trino Valera MD  Department of Hospital Medicine  [facility] - Telemetry ([facility])

## 2022-11-19 NOTE — NURSING
Discharge summary and instructions given to patient.  Prescribed medications script given to patient.   IV catheter removed no complications noted.   Reminded of follow-up appointments . Verbalized understanding.   Left the unit per wheelchair. Accompanied by family  With personal belongings at hand.

## 2022-11-19 NOTE — PLAN OF CARE
[facility] - Telemetry (Hospital)  Discharge Final Note    Primary Care Provider: [facility]    Expected Discharge Date: 11/19/2022    Final Discharge Note (most recent)       Final Note - 11/19/22 0941          Final Note    Assessment Type Final Discharge Note     Anticipated Discharge Disposition Home or Self Care        Post-Acute Status    Discharge Delays None known at this time                     Important Message from Medicare

## 2022-11-23 NOTE — PHYSICIAN QUERY
PT Name: Fransisco Durand  MR #: [id number]     DOCUMENTATION CLARIFICATION     CDS: Eliazar ARREAGA,RN        Contact information:[email or web address]    Query Date: November 23, 2022    By submitting this query, we are merely seeking further clarification of documentation.  Please utilize your independent clinical judgment when addressing the question(s) below.  The Medical Record contains the following:  Indicators Supporting Clinical Findings Location in Medical Record   x Acute Illness (e.g. AMI, Sepsis, etc.) Acute blood loss anemia  11/16  Gastroenterology Consults   x Vital Signs  Vital Signs (24 h Range):   Temp: 97.8 F---->98.1 F  Pulse: 80--->112  Resp: 15--->30  SBP: 72---->125  DBP: 47---->76     11/15   H&P    Acidosis documented     x ABGs/Labs Hgb: 8.9--->7.2----->9.0---->7.3----->8.6------>8.1-->8.5  Hct: 28.2--->22.9-->28.2--->23.2--->27.2--->24.7-->25.9 11/14---->11/19 Labs   x Hypotension or Low Blood Pressure documented He took his BP meds prior to arrival, and found to have hypotension with BP 78/47, .  11/16   PN     Altered Mental Status or Confusion      Diaphoresis, Cold Extremities or Cyanosis      Oliguria     x Medication/Treatment  -Vasopressors  -Inotropic Drugs  -IV Fluids   -IV Antibiotics  -Cardiac Assist Devices  -Hemodynamic Monitoring  -Blood/Blood Products Epinephrine injection As needed (PRN)  Lactated Ringers bolus 2,754 ml Intravenous ED 1 Time 1 dose(s) given  Norepinephrine 4 mg in dextrose 5% 250 ml infusion Intravenous Continuous  Sodium chloride 0.9% bolus 1,000 ml Intravenous Once 1 dose(s) given  Central line: Location:  Right internal jugular   Catheter type:  Triple lumen     Transfuse RBC 2 Units Completed 11/16/22 @0321  Transfuse RBC 2 Units Completed 11/16/22 @321 11/16 MAR    11/14--->11/15 MAR    11/15 MAR    11/15 MAR  11/15 Procedure      11/15-->11/16  Single Transfusion Record   x Other Melena, hematemesis without nausea 11/16  Gastroenterology  Consults     Provider, please specify diagnosis or diagnoses associated with above clinical findings.  [    ] Hemorrhagic Shock   [    ] Hypovolemic Shock   [    ] Other Shock (please specify): __________   [    ] Shock, Unspecified   [    ] Other Condition (please specify): _________   [ x ] Clinically Undetermined       Please document in your progress notes daily for the duration of treatment until resolved and include in your discharge summary.     Form No. [id number]

## 2023-01-01 ENCOUNTER — HOSPITAL ENCOUNTER (EMERGENCY)
Facility: HOSPITAL | Age: 73
End: 2023-06-11
Attending: EMERGENCY MEDICINE
Payer: OTHER GOVERNMENT

## 2023-01-01 ENCOUNTER — HOSPITAL ENCOUNTER (EMERGENCY)
Facility: HOSPITAL | Age: 73
Discharge: HOME | End: 2023-03-13
Attending: EMERGENCY MEDICINE
Payer: OTHER GOVERNMENT

## 2023-01-01 VITALS
HEART RATE: 84 BPM | OXYGEN SATURATION: 95 % | SYSTOLIC BLOOD PRESSURE: 138 MMHG | DIASTOLIC BLOOD PRESSURE: 72 MMHG | RESPIRATION RATE: 19 BRPM | TEMPERATURE: 98 F

## 2023-01-01 DIAGNOSIS — M79.89 LEG SWELLING: ICD-10-CM

## 2023-01-01 DIAGNOSIS — I46.9 CARDIAC ARREST: Primary | ICD-10-CM

## 2023-01-01 DIAGNOSIS — L03.90 CELLULITIS, UNSPECIFIED CELLULITIS SITE: Primary | ICD-10-CM

## 2023-01-01 LAB
ALBUMIN SERPL BCP-MCNC: 3 G/DL (ref 3.5–5.2)
ALP SERPL-CCNC: 115 U/L (ref 55–135)
ALT SERPL W/O P-5'-P-CCNC: 7 U/L (ref 10–44)
ANION GAP SERPL CALC-SCNC: 10 MMOL/L (ref 8–16)
AST SERPL-CCNC: 11 U/L (ref 10–40)
BASOPHILS # BLD AUTO: 0.05 K/UL (ref 0–0.2)
BASOPHILS NFR BLD: 0.6 % (ref 0–1.9)
BILIRUB SERPL-MCNC: 0.3 MG/DL (ref 0.1–1)
BNP SERPL-MCNC: 38 PG/ML (ref 0–99)
BUN SERPL-MCNC: 12 MG/DL (ref 8–23)
CALCIUM SERPL-MCNC: 8.8 MG/DL (ref 8.7–10.5)
CHLORIDE SERPL-SCNC: 98 MMOL/L (ref 95–110)
CK SERPL-CCNC: 196 U/L (ref 20–200)
CO2 SERPL-SCNC: 32 MMOL/L (ref 23–29)
CREAT SERPL-MCNC: 1.5 MG/DL (ref 0.5–1.4)
DIFFERENTIAL METHOD: ABNORMAL
EOSINOPHIL # BLD AUTO: 0.3 K/UL (ref 0–0.5)
EOSINOPHIL NFR BLD: 3.2 % (ref 0–8)
ERYTHROCYTE [DISTWIDTH] IN BLOOD BY AUTOMATED COUNT: 18.6 % (ref 11.5–14.5)
EST. GFR  (NO RACE VARIABLE): 49.5 ML/MIN/1.73 M^2
GLUCOSE SERPL-MCNC: 93 MG/DL (ref 70–110)
HCT VFR BLD AUTO: 42.4 % (ref 40–54)
HGB BLD-MCNC: 12.2 G/DL (ref 14–18)
IMM GRANULOCYTES # BLD AUTO: 0.03 K/UL (ref 0–0.04)
IMM GRANULOCYTES NFR BLD AUTO: 0.4 % (ref 0–0.5)
LYMPHOCYTES # BLD AUTO: 0.9 K/UL (ref 1–4.8)
LYMPHOCYTES NFR BLD: 11.1 % (ref 18–48)
MCH RBC QN AUTO: 23.5 PG (ref 27–31)
MCHC RBC AUTO-ENTMCNC: 28.8 G/DL (ref 32–36)
MCV RBC AUTO: 82 FL (ref 82–98)
MONOCYTES # BLD AUTO: 0.9 K/UL (ref 0.3–1)
MONOCYTES NFR BLD: 10.9 % (ref 4–15)
NEUTROPHILS # BLD AUTO: 6.2 K/UL (ref 1.8–7.7)
NEUTROPHILS NFR BLD: 73.8 % (ref 38–73)
NRBC BLD-RTO: 0 /100 WBC
PLATELET # BLD AUTO: 263 K/UL (ref 150–450)
PMV BLD AUTO: 9.7 FL (ref 9.2–12.9)
POTASSIUM SERPL-SCNC: 3.8 MMOL/L (ref 3.5–5.1)
PROT SERPL-MCNC: 7.5 G/DL (ref 6–8.4)
RBC # BLD AUTO: 5.19 M/UL (ref 4.6–6.2)
SODIUM SERPL-SCNC: 140 MMOL/L (ref 136–145)
TROPONIN I SERPL DL<=0.01 NG/ML-MCNC: 0.02 NG/ML (ref 0–0.03)
WBC # BLD AUTO: 8.45 K/UL (ref 3.9–12.7)

## 2023-01-01 PROCEDURE — 93010 EKG 12-LEAD: ICD-10-PCS | Mod: ,,, | Performed by: INTERNAL MEDICINE

## 2023-01-01 PROCEDURE — 82550 ASSAY OF CK (CPK): CPT | Mod: ER | Performed by: EMERGENCY MEDICINE

## 2023-01-01 PROCEDURE — 99285 EMERGENCY DEPT VISIT HI MDM: CPT | Mod: ER,25

## 2023-01-01 PROCEDURE — 93005 ELECTROCARDIOGRAM TRACING: CPT | Mod: ER

## 2023-01-01 PROCEDURE — 92950 HEART/LUNG RESUSCITATION CPR: CPT | Mod: ER

## 2023-01-01 PROCEDURE — 80053 COMPREHEN METABOLIC PANEL: CPT | Mod: ER | Performed by: EMERGENCY MEDICINE

## 2023-01-01 PROCEDURE — 85025 COMPLETE CBC W/AUTO DIFF WBC: CPT | Mod: ER | Performed by: EMERGENCY MEDICINE

## 2023-01-01 PROCEDURE — 93010 ELECTROCARDIOGRAM REPORT: CPT | Mod: ,,, | Performed by: INTERNAL MEDICINE

## 2023-01-01 PROCEDURE — 99285 EMERGENCY DEPT VISIT HI MDM: CPT | Mod: 25,ER

## 2023-01-01 PROCEDURE — 84484 ASSAY OF TROPONIN QUANT: CPT | Mod: ER | Performed by: EMERGENCY MEDICINE

## 2023-01-01 PROCEDURE — 83880 ASSAY OF NATRIURETIC PEPTIDE: CPT | Mod: ER | Performed by: EMERGENCY MEDICINE

## 2023-01-01 RX ORDER — CLINDAMYCIN HYDROCHLORIDE 150 MG/1
450 CAPSULE ORAL EVERY 8 HOURS
Qty: 45 CAPSULE | Refills: 0 | Status: SHIPPED | OUTPATIENT
Start: 2023-01-01 | End: 2023-01-01

## 2023-01-01 RX ORDER — CLINDAMYCIN HYDROCHLORIDE 150 MG/1
450 CAPSULE ORAL EVERY 8 HOURS
Qty: 45 CAPSULE | Refills: 0 | Status: SHIPPED | OUTPATIENT
Start: 2023-01-01 | End: 2023-01-01 | Stop reason: SDUPTHER

## 2023-01-01 RX ORDER — CLINDAMYCIN HYDROCHLORIDE 150 MG/1
450 CAPSULE ORAL EVERY 8 HOURS
Qty: 45 CAPSULE | Refills: 0 | OUTPATIENT
Start: 2023-01-01 | End: 2023-01-01 | Stop reason: SDUPTHER

## 2023-02-20 PROBLEM — K92.2 UPPER GI BLEED: Status: RESOLVED | Noted: 2022-01-01 | Resolved: 2023-01-01

## 2023-03-13 NOTE — ED PROVIDER NOTES
"Encounter Date: 3/13/2023       History     Chief Complaint   Patient presents with    Leg Pain     "It's been swelling and has blisters" s/s for a few weeks.      The history is provided by the patient.   Leg Pain   There was no injury mechanism. The incident occurred several days ago. The pain has been Constant since onset. Pertinent negatives include no numbness, no loss of motion, no muscle weakness, no loss of sensation and no tingling.   Review of patient's allergies indicates:   Allergen Reactions    Asa [aspirin]      Pt states "uncoated" ASA     Past Medical History:   Diagnosis Date    AICD generator infection     Anemia     Anticoagulant long-term use     Arthritis     CAD (coronary artery disease)     CHF (congestive heart failure)     Chronic pain     CKD (chronic kidney disease) stage 3, GFR 30-59 ml/min     COPD (chronic obstructive pulmonary disease)     Diabetes mellitus     Erectile dysfunction     Hyperlipemia     Hypertension     Neuropathy     Vitamin deficiency      Past Surgical History:   Procedure Laterality Date    CARDIAC SURGERY      CARPAL TUNNEL RELEASE      CORONARY ANGIOPLASTY WITH STENT PLACEMENT      ESOPHAGOGASTRODUODENOSCOPY N/A 11/16/2022    Procedure: EGD (ESOPHAGOGASTRODUODENOSCOPY);  Surgeon: Lyndsay Whitten MD;  Location: [facility];  Service: Endoscopy;  Laterality: N/A;     Family History   Problem Relation Age of Onset    Heart disease Mother     Cancer Father      Social History     Tobacco Use    Smoking status: Every Day     Packs/day: 1.00     Years: 50.00     Pack years: 50.00     Types: Cigarettes    Smokeless tobacco: Never   Substance Use Topics    Alcohol use: Yes     Comment: very little    Drug use: No     Review of Systems   Constitutional:  Negative for fever.   HENT:  Negative for sore throat.    Respiratory:  Negative for shortness of breath.    Cardiovascular:  Positive for leg swelling. Negative for chest pain.   Gastrointestinal:  Negative for nausea. "   Genitourinary:  Negative for dysuria.   Musculoskeletal:  Negative for back pain.   Skin:  Negative for rash.   Neurological:  Positive for weakness. Negative for tingling and numbness.   Hematological:  Does not bruise/bleed easily.     Physical Exam     Initial Vitals [03/13/23 1503]   BP Pulse Resp Temp SpO2   (!) 128/58 82 20 97.8 °F (36.6 °C) 95 %      MAP       --         Physical Exam    Nursing note and vitals reviewed.  Constitutional: He appears well-developed and well-nourished. No distress.   HENT:   Head: Normocephalic and atraumatic.   Mouth/Throat: Oropharynx is clear and moist.   Eyes: Conjunctivae and EOM are normal. Pupils are equal, round, and reactive to light.   Neck: Neck supple.   Normal range of motion.  Cardiovascular:  Normal rate, regular rhythm and normal heart sounds.     Exam reveals no gallop and no friction rub.       No murmur heard.  Pulmonary/Chest: Breath sounds normal. No respiratory distress. He has no wheezes. He has no rhonchi. He has no rales.   Abdominal: Abdomen is soft. Bowel sounds are normal. He exhibits no distension and no mass. There is no abdominal tenderness. There is no rebound and no guarding.   Musculoskeletal:         General: Edema (trace) present. Normal range of motion.      Cervical back: Normal range of motion and neck supple.     Neurological: He is alert and oriented to person, place, and time. He has normal strength.   Skin: Skin is warm and dry. No rash noted.        Psychiatric: He has a normal mood and affect. Thought content normal.       ED Course   Procedures  Labs Reviewed   CBC W/ AUTO DIFFERENTIAL - Abnormal; Notable for the following components:       Result Value    Hemoglobin 12.2 (*)     MCH 23.5 (*)     MCHC 28.8 (*)     RDW 18.6 (*)     Lymph # 0.9 (*)     Gran % 73.8 (*)     Lymph % 11.1 (*)     All other components within normal limits   COMPREHENSIVE METABOLIC PANEL - Abnormal; Notable for the following components:    CO2 32 (*)      Creatinine 1.5 (*)     Albumin 3.0 (*)     ALT 7 (*)     eGFR 49.5 (*)     All other components within normal limits   B-TYPE NATRIURETIC PEPTIDE   CK   TROPONIN I   URINALYSIS, REFLEX TO URINE CULTURE          Imaging Results              X-Ray Chest AP Portable (Final result)  Result time 03/13/23 15:28:33      Final result by Benjamín Castillo MD (03/13/23 15:28:33)                   Impression:      In comparison to the prior study, there is no adverse interval changes      Electronically signed by: Benjamín Castillo MD  Date:    03/13/2023  Time:    15:28               Narrative:    EXAMINATION:  XR CHEST AP PORTABLE    CLINICAL HISTORY:  chest pain;    TECHNIQUE:  Single frontal view of the chest was performed.    COMPARISON:  11/15/2022.    FINDINGS:  Cardiac silhouette is borderline enlarged.  Incidental azygous fissure right upper lobe.  The lungs demonstrate no evidence of active disease.  No evidence of pleural effusion or pneumothorax.  Bones appear intact.  Mild scattered degenerative change.                                       Medications - No data to display  Medical Decision Making:   Initial Assessment:   Patient is complaining of blister to his left lower extremity.  Recently doubled up on bumex the last three daysc  Differential Diagnosis:   Cellulitis, chf, leg swelling  Clinical Tests:   Lab Tests: Ordered and Reviewed  The following lab test(s) were unremarkable: CBC, CMP and BNP  Radiological Study: Ordered and Reviewed  Medical Tests: Ordered and Reviewed  ED Management:  Labs and imaging reviewed by me.  No acute findings except for a slight bump in creatinine secondary to doubling up on bumex.  Considered admission, but patient can be treated outpatient with clindamycin, and will go back to regular dose of bumex.                          Clinical Impression:   Final diagnoses:  [M79.89] Leg swelling  [L03.90] Cellulitis, unspecified cellulitis site (Primary)        ED Disposition Condition     Discharge Stable          ED Prescriptions       Medication Sig Dispense Start Date End Date Auth. Provider    clindamycin (CLEOCIN) 150 MG capsule Take 3 capsules (450 mg total) by mouth every 8 (eight) hours. for 5 days 45 capsule 3/13/2023 3/18/2023 Néstor Fatima MD          Follow-up Information       Follow up With Specialties Details Why Contact Info    [facility]    [address] [address] [phone]               Néstor Fatima MD  03/13/23 0161

## 2023-06-12 NOTE — ED PROVIDER NOTES
"Encounter Date: 6/11/2023       History     Chief Complaint   Patient presents with    Cardiac Arrest     HPI  Pt with witnessed arrest, EMS coded pt for >50 minutes on scene without ROSC, epinephrine X 9 given, Pt with momentary PEA greater than 40 so EMS transported pt to ED per protocol.    Review of patient's allergies indicates:   Allergen Reactions    Asa [aspirin]      Pt states "uncoated" ASA     Past Medical History:   Diagnosis Date    AICD generator infection     Anemia     Anticoagulant long-term use     Arthritis     CAD (coronary artery disease)     CHF (congestive heart failure)     Chronic pain     CKD (chronic kidney disease) stage 3, GFR 30-59 ml/min     COPD (chronic obstructive pulmonary disease)     Diabetes mellitus     Erectile dysfunction     Hyperlipemia     Hypertension     Neuropathy     Vitamin deficiency      Past Surgical History:   Procedure Laterality Date    CARDIAC SURGERY      CARPAL TUNNEL RELEASE      CORONARY ANGIOPLASTY WITH STENT PLACEMENT      ESOPHAGOGASTRODUODENOSCOPY N/A 11/16/2022    Procedure: EGD (ESOPHAGOGASTRODUODENOSCOPY);  Surgeon: Lyndsay Whitten MD;  Location: [facility];  Service: Endoscopy;  Laterality: N/A;     Family History   Problem Relation Age of Onset    Heart disease Mother     Cancer Father      Social History     Tobacco Use    Smoking status: Every Day     Packs/day: 1.00     Years: 50.00     Pack years: 50.00     Types: Cigarettes    Smokeless tobacco: Never   Substance Use Topics    Alcohol use: Yes     Comment: very little    Drug use: No     Review of Systems   Unable to perform ROS: Acuity of condition     Physical Exam     Initial Vitals   BP Pulse Resp Temp SpO2   -- -- -- -- --      MAP       --         Physical Exam    Nursing note and vitals reviewed.  Constitutional:   CPR in progress   HENT:   Head: Atraumatic.   Eyes:   Pupils fixed dilated, No corneal reflex   Cardiovascular:            Asystole   Pulmonary/Chest:   No spontaneous " respirations   Abdominal: Abdomen is soft.     Neurological:   Neg gag, Unresponsive GCS 3   Skin:   Chronic LE skin changes       ED Course   Procedures  Labs Reviewed - No data to display       Imaging Results    None     No ROSC >50 minutes after 9 epi/Ca/Bicarb pre-arrival  Time of Death      Medications - No data to display  Medical Decision Making:   Initial Assessment:   Cardiac arrest  Differential Diagnosis:   ACS, GI bleed, CHF, Arrhythmia, Sudden death  ED Management:  Pt with extensive code, evaluated rhythm, asystole  Time of death                         Clinical Impression:   Final diagnoses:  [I46.9] Cardiac arrest (Primary)        ED Disposition Condition                     Barry Moran MD  23

## 2023-06-12 NOTE — ED NOTES
PT arrived by EMS at 2124 with CPR in progress. PT was bagged by RRT Jorge ABREU And Zoll was placed on PT. Zoll read the CO2 to be 50 mmHG. It was called at 2129 Asystole time of death.

## 2023-08-23 NOTE — HOSPITAL COURSE
Admitted for upper GI bleed. EGD: Three non-bleeding cratered gastric ulcers with a flat pigmented spot (Matt Class IIc) and the other with pigmented material were found in the gastric fundus and on the lesser curvature of the stomach. The largest lesion was 10 mm in largest dimension  11/17: hgb 7.9 this am. Asymptomatic, no signs of bleeding. Will discuss with GI. Cont CLD. Trend h/h q8h.   11/18: H/H stable. Discussed with GI. Will start regular diet. Cont to monitor h/h. Possible dc tomorrow if h/h stable and tolerating diet.     11/19  Hb/hct remains stable. +bowel movement. States appearance of stool is dark but feels more residual in nature. Endorses symptom improvement. States having [facility] appointment on Monday. Feels ready for discharge. Protonix and carafate sent to pharm on file. Gastroenterology follow up outpatient in 2 weeks.    On discharge patient's aspirin and plavix held until repeat hb/hct collected outpatient. Advised to schedule hospital follow up visit with primary cardiologist.    Patient hypotensive. Entresto and beta blocker held on discharge and to be resumed next week. Congestive heart failure clinic referral placed on discharge.     Patient seen and evaluated by me. Patient was determined to be suitable for d/c. Patient deemed stable for discharge to home with homehealth physical/occupational therapy and lab collection with nurse practitioner to visit home.     Topical Steroids Applications Pregnancy And Lactation Text: Most topical steroids are considered safe to use during pregnancy and lactation.  Any topical steroid applied to the breast or nipple should be washed off before breastfeeding.